# Patient Record
Sex: FEMALE | Race: BLACK OR AFRICAN AMERICAN | Employment: OTHER | URBAN - METROPOLITAN AREA
[De-identification: names, ages, dates, MRNs, and addresses within clinical notes are randomized per-mention and may not be internally consistent; named-entity substitution may affect disease eponyms.]

---

## 2017-02-08 PROBLEM — I63.9 CVA (CEREBROVASCULAR ACCIDENT) (HCC): Status: ACTIVE | Noted: 2017-02-08

## 2017-02-08 PROBLEM — I06.0 RHEUMATIC AORTIC STENOSIS: Status: ACTIVE | Noted: 2017-02-08

## 2017-02-08 PROBLEM — Q21.10 ATRIAL SEPTAL DEFECT: Status: ACTIVE | Noted: 2017-02-08

## 2017-02-13 ENCOUNTER — TELEPHONE (OUTPATIENT)
Dept: NUTRITION | Age: 72
End: 2017-02-13

## 2017-02-13 NOTE — TELEPHONE ENCOUNTER
Nutrition Counseling:  Pt left voicemail to ask about procedure for an appt here per Trina Aguirre RD, recommendation and stated she has [facility]. Left voicemail for pt and advised how to have referral sent and left fax number. Advised to call back and that I will be able to answer questions.     Barbara Franks MS, 65 [address], [address], [place]  W: [phone]  C: [phone]

## 2017-02-14 ENCOUNTER — TELEPHONE (OUTPATIENT)
Dept: NUTRITION | Age: 72
End: 2017-02-14

## 2017-02-28 ENCOUNTER — HOSPITAL ENCOUNTER (OUTPATIENT)
Dept: PHYSICAL THERAPY | Age: 72
Discharge: HOME | End: 2017-02-28
Payer: MEDICARE

## 2017-02-28 PROCEDURE — G8979 MOBILITY GOAL STATUS: HCPCS

## 2017-02-28 PROCEDURE — 97161 PT EVAL LOW COMPLEX 20 MIN: CPT

## 2017-02-28 PROCEDURE — G8978 MOBILITY CURRENT STATUS: HCPCS

## 2017-02-28 NOTE — PROGRESS NOTES
Missy Risk  : [facility]  [facility], [address] [place] [address],[address], [address] [facility]  [phone]   [phone]       OUTPATIENT PHYSICAL THERAPY:Initial Assessment 3/1/2017    ICD-10: Treatment Diagnosis: Pain in left knee (M25.562); Pain in right knee (M25.561); Difficulty in walking, not elsewhere classified (R26.2)  Precautions/Allergies:   Flexeril [cyclobenzaprine]   Fall Risk Score: 4 (? 5 = High Risk)  MD Orders: referral to physical therapy; please add aquatic therapy MEDICAL/REFERRING DIAGNOSIS:  Bilateral primary osteoarthritis of knee [M17.0]  Chronic pain of both ankles [M25.571, G89.29, M25.572]   DATE OF ONSET: 17  REFERRING PHYSICIAN: Melania Brambila MD  RETURN PHYSICIAN APPOINTMENT: 2017     INITIAL ASSESSMENT:  Ms. Ricki Davies presents with complaints of back, knee, and ankle pain. She presents with decreased LE strength, decreased endurance, and increased pain with activities. She is currently a member of the Sports [employer] in [facility] and would like learn exercises that she can do at their pool. She would benefit from skilled physical therapy to teach HEP and improve functional mobility. PROBLEM LIST (Impacting functional limitations):  1. Decreased Strength   2. Decreased endurance  3. Increased pain INTERVENTIONS PLANNED:  1. aquatic therapy   2. Therapeutic exercise for ROM, strengthening, flexibility  3. Modalities as needed for pain   TREATMENT PLAN:  Effective Dates: 17 TO 17. Frequency/Duration: 1 time a week for 4-6 weeks  GOALS: (Goals have been discussed and agreed upon with patient.)  Discharge Goals: Time Frame: 4-6 weeks  1. Pt will be independent with HEP. 2. Pt will improve 5 min walk endurance test to 500 ft for improved ADLs. 3. Pt will increase LE strength to 4+/5 for improved mobility.    Rehabilitation Potential For Stated Goals: Good  Regarding Kathee Hoof therapy, I certify that the treatment plan above will be carried out by a therapist or under their direction. Thank you for this referral,    Jenni Harper, PT         Referring Physician Signature: Melvi Cortez MD              Date                      HISTORY:   History of Present Injury/Illness (Reason for Referral):  Knee and back pain for years that has gotten worse. Pain in the knees, ankles, and back. She says that her blood may be producing too much protein and that could be contributing to her pain. She is seeing a hematologist for this. Ankles feel stiff in the morning. Gets stiff if sits for too long. Able to walk for 5 min before she needs to sit down. Unable to mop floor or push the vacuum for last 2 years. GOAL for therapy: help with pain and possibly loss some weight. 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 2 story house with main bedroom on the 2nd floor. She does have a bedroom on the 1st floor if she doesn't feel like going upstairs. No steps to get inside house. Prior Level of Function/Work/Activity:  Not working. Would like to be able to exercise. She is a member of the sports club but has not been since October. Current Medications:       Current Outpatient Prescriptions:     levothyroxine (SYNTHROID) 112 mcg tablet, Take  by mouth Daily (before breakfast). , Disp: , Rfl:     diflunisal (DOLOBID) 500 mg tab, Take  by mouth daily. , Disp: , Rfl:     omeprazole (PRILOSEC) 40 mg capsule, Take 40 mg by mouth daily. , Disp: , Rfl:     meclizine (ANTIVERT) 25 mg tablet, Take  by mouth three (3) times daily as needed. , Disp: , Rfl:     magnesium 250 mg tab, Take  by mouth., Disp: , Rfl:     cpap machine kit, by Does Not Apply route.  7 cm, Disp: , Rfl:     levETIRAcetam (KEPPRA) 250 mg tablet, Take 3 tablets in the morning and 3 tablets in the evening (Patient taking differently: 750 mg two (2) times a day.), Disp: 180 Tab, Rfl: 0    verapamil (CALAN) 240 mg capsule, Take 240 mg by mouth daily. , Disp: , Rfl:     aspirin (ASPIRIN) 325 mg tablet, Take 325 mg by mouth daily. , Disp: , Rfl:     rosuvastatin (CRESTOR) 20 mg tablet, Take 20 mg by mouth daily. , Disp: , Rfl:     calcium 500 mg tab, Take 600 mg by mouth two (2) times a day., Disp: , Rfl:     folic acid-vit F4-NUP X87 (FOLTX) 2.5-25-2 mg tablet, Take 1 Tab by mouth daily. , Disp: , Rfl:     torsemide (DEMADEX) 20 mg tablet, Take 20 mg by mouth daily as needed. , Disp: , Rfl:     potassium chloride SR (KLOR-CON 10) 10 mEq tablet, Take 10 mEq by mouth daily as needed. , Disp: , Rfl:     HYDROcodone-acetaminophen (NORCO)  mg tablet, Take 1 Tab by mouth every eight (8) hours as needed for Pain., Disp: , Rfl:     ranitidine (ZANTAC) 150 mg tablet, Take 150 mg by mouth two (2) times a day., Disp: , Rfl:     loratadine (CLARITIN) 10 mg tablet, Take 10 mg by mouth daily. , Disp: , Rfl:     ergocalciferol (VITAMIN D2) 50,000 unit capsule, Take 50,000 Units by mouth every seven (7) days. , Disp: , Rfl:     GLUCOSAMINE HCL/CHONDR BURNHAM A NA (OSTEO BI-FLEX PO), Take 2 Tabs by mouth two (2) times a day., Disp: , Rfl:    Date Last Reviewed:  2-28-17   Number of Personal Factors/Comorbidities that affect the Plan of Care: 1-2: MODERATE COMPLEXITY   EXAMINATION:   Observation/Orthostatic Postural Assessment:          Pt arrived to therapy with standard cane. She presents in no apparent distress.    Palpation:          No tenderness around the knees, tender to distal lower leg bilateral  Strength:  Manual muscle testing   Right LE Left LE   Hip flexion 4- 4-   Hip abduction in sitting 4 4   Knee flexion  4- 4-   Knee extension 4 4   Ankle DF 4+ 4+     Functional Mobility:         Gait/Ambulation:  Pt ambulates with wide base of support, decreased speed, increased right stance time, trunk lean side to side         Transfers:  Independent with transfers   Tool Used: Timed Up and Go (TUG)  Score:  Initial: 13 seconds Most Recent: X seconds (Date: -- )   Interpretation of Score: The test measures, in seconds, the time taken by an individual to stand up from a standard arm chair (seat height 46 cm [18 in], arm height 65 cm [25.6 in]), walk a distance of 3 meters (118 in, approx 10 ft), turn, walk back to the chair and sit down. If the individual takes longer than 14 seconds to complete TUG, this indicates risk for falls. ENDURANCE TEST: 5 MIN WALK TEST FOR ENDURANCE: 375 FT with no assistive device 02 after test: 98%; HR 88 bpm; 2 rest breaks taken during test     Body Structures Involved:  1. Joints  2. Muscles Body Functions Affected:  1. Neuromusculoskeletal Activities and Participation Affected:  1. Mobility  2. Community, Social and [place]   Number of elements (examined above) that affect the Plan of Care: 1-2: LOW COMPLEXITY   CLINICAL PRESENTATION:   Presentation: Stable and uncomplicated: LOW COMPLEXITY   CLINICAL DECISION MAKING:   Outcome Measure: Tool Used: Lower Extremity Functional Scale (LEFS)  Score:  Initial: 19/80 Most Recent: X/80 (Date: -- )   Interpretation of Score: 20 questions each scored on a 5 point scale with 0 representing \"extreme difficulty or unable to perform\" and 4 representing \"no difficulty\". The lower the score, the greater the functional disability. 80/80 represents no disability. Minimal detectable change is 9 points. Score 80 79-63 62-48 47-32 31-16 15-1 0   Modifier CH CI CJ CK CL CM CN     ?  Mobility - Walking and Moving Around:     - CURRENT STATUS: CL - 60%-79% impaired, limited or restricted    - GOAL STATUS: CK - 40%-59% impaired, limited or restricted    - D/C STATUS:  ---------------To be determined---------------    Medical Necessity:   · Patient is expected to demonstrate progress in strength, balance and endurance to to improve functional mobility. Reason for Services/Other Comments:  · Patient continues to require skilled intervention due to increased pain, decreased mobility. Use of outcome tool(s) and clinical judgement create a POC that gives a: Clear prediction of patient's progress: LOW COMPLEXITY            TREATMENT:   (In addition to Assessment/Re-Assessment sessions the following treatments were rendered)  Pre-treatment Symptoms/Complaints:  Pt reports no pain with sitting. Pain if she sits for too long or stands for too long. Pain: Initial: 0/10 with sitting; at worst 9/10     Post Session:  0/10. Assessment only today, no treatment provided. Discussed aquatic therapy and for patient to increase walking distance around the home. Treatment/Session Assessment:    · Response to Treatment:  No treatment today. · Compliance with Program/Exercises: Will assess as treatment progresses. · Recommendations/Intent for next treatment session: \"Next visit will focus on aquatic therapy for endurance, LE strengthening\".   Total Treatment Duration: 45 minutes  PT Patient Time In/Time Out  Time In: 1330  Time Out: [address]

## 2017-03-01 NOTE — PROGRESS NOTES
Ambulatory/Rehab Services H2 Model Falls Risk Assessment    Risk Factor Pts. ·   Confusion/Disorientation/Impulsivity  []    4 ·   Symptomatic Depression  []   2 ·   Altered Elimination  []   1 ·   Dizziness/Vertigo  [x]   1 ·   Gender (Male)  []   1 ·   Any administered antiepileptics (anticonvulsants):  [x]   2 ·   Any administered benzodiazepines:  []   1 ·   Visual Impairment (specify):  []   1 ·   Portable Oxygen Use  []   1 ·   Orthostatic ? BP  []   1 ·   History of Recent Falls (within 3 mos.)  []   5     Ability to Rise from Chair (choose one) Pts. ·   Ability to rise in a single movement  []   0 ·   Pushes up, successful in one attempt  [x]   1 ·   Multiple attempts, but successful  []   3 ·   Unable to rise without assistance  []   4   Total: (5 or greater = High Risk) 4     Falls Prevention Plan:   []                Physical Limitations to Exercise (specify):   []                Mobility Assistance Device (type):   []                Exercise/Equipment Adaptation (specify):    ©2010 [facility] [address] Patent #[id number].  Federal Law prohibits the replication, distribution or use without written permission from [facility] [employer]

## 2017-03-02 ENCOUNTER — DOCUMENTATION ONLY (OUTPATIENT)
Dept: NUTRITION | Age: 72
End: 2017-03-02

## 2017-03-02 ENCOUNTER — TELEPHONE (OUTPATIENT)
Dept: NUTRITION | Age: 72
End: 2017-03-02

## 2017-03-02 NOTE — TELEPHONE ENCOUNTER
Nutrition Counseling:  Spoke with pt regarding her self-requested referral from Dr. Arthur Dunlap had arrived. Noted that pt does not have coverage by Medicare (Has advised pt of no coverage without a PMH of diabetes or CKD before referral was sent.) Offered free group education classes information and contact information for pt to apply for a sponsorship for a one-on-one appt with the dietitian. Pt was agreeable, particularly to the free group education classes. Advised that I can mail a schedule and details for the  for the sponsorship application. Mailed then today. Will close referral and fax details to referring physician.     Erica Olivas MS, [address], [address], [place]  W: [phone]  C: [phone]

## 2017-03-07 ENCOUNTER — HOSPITAL ENCOUNTER (OUTPATIENT)
Dept: PHYSICAL THERAPY | Age: 72
Discharge: HOME | End: 2017-03-07

## 2017-03-07 NOTE — PROGRESS NOTES
Pt called to cancel appointment due to have heart issues and has a doctors appointment.      Venancio Hoffman, PTA

## 2017-03-14 ENCOUNTER — APPOINTMENT (OUTPATIENT)
Dept: PHYSICAL THERAPY | Age: 72
End: 2017-03-14

## 2017-03-21 ENCOUNTER — APPOINTMENT (OUTPATIENT)
Dept: PHYSICAL THERAPY | Age: 72
End: 2017-03-21

## 2017-03-28 ENCOUNTER — DOCUMENTATION ONLY (OUTPATIENT)
Dept: NUTRITION | Age: 72
End: 2017-03-28

## 2017-03-28 NOTE — PROGRESS NOTES
Nutrition Counseling:  Isaiah Barcenas, referral coordinator for Dr. Maggie Mccauley, called to check on referral status. Advised that I had faxed the outcome on 3/2/17. Advised I will re-fax the outcome and confirmed the fax number.     Yahir Nation MS, [address] [place] [address], [address], [place]  W: [phone]  C: [phone]

## 2017-04-20 ENCOUNTER — HOSPITAL ENCOUNTER (OUTPATIENT)
Dept: LAB | Age: 72
Discharge: HOME | End: 2017-04-20
Attending: INTERNAL MEDICINE
Payer: MEDICARE

## 2017-04-20 DIAGNOSIS — I06.0 RHEUMATIC AORTIC STENOSIS: ICD-10-CM

## 2017-04-20 LAB
ANION GAP BLD CALC-SCNC: 7 MMOL/L
BASOPHILS # BLD AUTO: 0 K/UL (ref 0–0.2)
BASOPHILS # BLD: 0 % (ref 0–2)
BUN SERPL-MCNC: 18 MG/DL (ref 8–23)
CALCIUM SERPL-MCNC: 8.7 MG/DL (ref 8.3–10.4)
CHLORIDE SERPL-SCNC: 109 MMOL/L (ref 98–107)
CO2 SERPL-SCNC: 27 MMOL/L (ref 21–32)
CREAT SERPL-MCNC: 0.9 MG/DL (ref 0.6–1)
DIFFERENTIAL METHOD BLD: ABNORMAL
EOSINOPHIL # BLD: 0.1 K/UL (ref 0–0.8)
EOSINOPHIL NFR BLD: 2 % (ref 0.5–7.8)
ERYTHROCYTE [DISTWIDTH] IN BLOOD BY AUTOMATED COUNT: 13.1 % (ref 11.9–14.6)
GLUCOSE SERPL-MCNC: 85 MG/DL (ref 65–100)
HCT VFR BLD AUTO: 40.7 % (ref 35.8–46.3)
HGB BLD-MCNC: 13.6 G/DL (ref 11.7–15.4)
INR PPP: 1 (ref 0.9–1.2)
LYMPHOCYTES # BLD AUTO: 31 % (ref 13–44)
LYMPHOCYTES # BLD: 1.1 K/UL (ref 0.5–4.6)
MCH RBC QN AUTO: 30.2 PG (ref 26.1–32.9)
MCHC RBC AUTO-ENTMCNC: 33.4 G/DL (ref 31.4–35)
MCV RBC AUTO: 90.4 FL (ref 79.6–97.8)
MONOCYTES # BLD: 0.2 K/UL (ref 0.1–1.3)
MONOCYTES NFR BLD AUTO: 6 % (ref 4–12)
NEUTS SEG # BLD: 2.2 K/UL (ref 1.7–8.2)
NEUTS SEG NFR BLD AUTO: 61 % (ref 43–78)
PLATELET # BLD AUTO: 117 K/UL (ref 150–450)
PMV BLD AUTO: 12.3 FL (ref 10.8–14.1)
POTASSIUM SERPL-SCNC: 4 MMOL/L (ref 3.5–5.1)
PROTHROMBIN TIME: 9.9 SEC (ref 9.6–12)
RBC # BLD AUTO: 4.5 M/UL (ref 4.05–5.25)
SODIUM SERPL-SCNC: 143 MMOL/L (ref 136–145)
WBC # BLD AUTO: 3.6 K/UL (ref 4.3–11.1)

## 2017-04-20 PROCEDURE — 85610 PROTHROMBIN TIME: CPT | Performed by: INTERNAL MEDICINE

## 2017-04-20 PROCEDURE — 80048 BASIC METABOLIC PNL TOTAL CA: CPT | Performed by: INTERNAL MEDICINE

## 2017-04-20 PROCEDURE — 36415 COLL VENOUS BLD VENIPUNCTURE: CPT | Performed by: INTERNAL MEDICINE

## 2017-04-20 PROCEDURE — 85025 COMPLETE CBC W/AUTO DIFF WBC: CPT | Performed by: INTERNAL MEDICINE

## 2017-05-02 NOTE — PROGRESS NOTES
Patient pre-assessment complete for [facility] poss with Dr Elis Cunningham scheduled for 5/3/17 at 11am, arrival time 9am. Patient verified using . Patient instructed to bring all home medications in labeled bottles on the day of procedure. NPO status reinforced. Patient informed to take a full dose aspirin 325mg  or 81 mg x 4 on the day of procedure. Instructed they can take all other medications excluding vitamins & supplements. Patient verbalizes understanding of all instructions & denies any questions at this time.

## 2017-05-03 ENCOUNTER — HOSPITAL ENCOUNTER (OUTPATIENT)
Dept: CARDIAC CATH/INVASIVE PROCEDURES | Age: 72
Discharge: HOME | End: 2017-05-03
Attending: INTERNAL MEDICINE | Admitting: INTERNAL MEDICINE
Payer: MEDICARE

## 2017-05-03 VITALS
HEIGHT: 61 IN | SYSTOLIC BLOOD PRESSURE: 181 MMHG | TEMPERATURE: 98.4 F | DIASTOLIC BLOOD PRESSURE: 78 MMHG | WEIGHT: 293 LBS | OXYGEN SATURATION: 98 % | HEART RATE: 71 BPM | RESPIRATION RATE: 16 BRPM | BODY MASS INDEX: 55.32 KG/M2

## 2017-05-03 PROBLEM — E85.9 AMYLOIDOSIS (HCC): Chronic | Status: ACTIVE | Noted: 2017-05-03

## 2017-05-03 LAB
ATRIAL RATE: 89 BPM
CALCULATED P AXIS, ECG09: 60 DEGREES
CALCULATED R AXIS, ECG10: -28 DEGREES
CALCULATED T AXIS, ECG11: 64 DEGREES
DIAGNOSIS, 93000: NORMAL
P-R INTERVAL, ECG05: 146 MS
Q-T INTERVAL, ECG07: 380 MS
QRS DURATION, ECG06: 100 MS
QTC CALCULATION (BEZET), ECG08: 462 MS
VENTRICULAR RATE, ECG03: 89 BPM

## 2017-05-03 PROCEDURE — C1769 GUIDE WIRE: HCPCS

## 2017-05-03 PROCEDURE — 77030029997 HC DEV COM RDL R BND TELE -B

## 2017-05-03 PROCEDURE — 99152 MOD SED SAME PHYS/QHP 5/>YRS: CPT

## 2017-05-03 PROCEDURE — 74011250636 HC RX REV CODE- 250/636: Performed by: INTERNAL MEDICINE

## 2017-05-03 PROCEDURE — 93571 IV DOP VEL&/PRESS C FLO 1ST: CPT

## 2017-05-03 PROCEDURE — 74011000258 HC RX REV CODE- 258: Performed by: INTERNAL MEDICINE

## 2017-05-03 PROCEDURE — C1887 CATHETER, GUIDING: HCPCS

## 2017-05-03 PROCEDURE — 74011000250 HC RX REV CODE- 250: Performed by: INTERNAL MEDICINE

## 2017-05-03 PROCEDURE — C1894 INTRO/SHEATH, NON-LASER: HCPCS

## 2017-05-03 PROCEDURE — 77030004534 HC CATH ANGI DX INFN CARD -A

## 2017-05-03 PROCEDURE — 77030015766

## 2017-05-03 PROCEDURE — 85347 COAGULATION TIME ACTIVATED: CPT | Performed by: INTERNAL MEDICINE

## 2017-05-03 PROCEDURE — 99153 MOD SED SAME PHYS/QHP EA: CPT

## 2017-05-03 PROCEDURE — 74011250636 HC RX REV CODE- 250/636

## 2017-05-03 PROCEDURE — 74011636320 HC RX REV CODE- 636/320: Performed by: INTERNAL MEDICINE

## 2017-05-03 PROCEDURE — 93458 L HRT ARTERY/VENTRICLE ANGIO: CPT

## 2017-05-03 PROCEDURE — 93005 ELECTROCARDIOGRAM TRACING: CPT | Performed by: INTERNAL MEDICINE

## 2017-05-03 RX ORDER — GUAIFENESIN 100 MG/5ML
324 LIQUID (ML) ORAL ONCE
Status: DISCONTINUED | OUTPATIENT
Start: 2017-05-03 | End: 2017-05-03 | Stop reason: HOSPADM

## 2017-05-03 RX ORDER — HEPARIN SODIUM 10000 [USP'U]/ML
40-80 INJECTION, SOLUTION INTRAVENOUS; SUBCUTANEOUS
Status: DISCONTINUED | OUTPATIENT
Start: 2017-05-03 | End: 2017-05-03 | Stop reason: HOSPADM

## 2017-05-03 RX ORDER — FENTANYL CITRATE 50 UG/ML
25-50 INJECTION, SOLUTION INTRAMUSCULAR; INTRAVENOUS
Status: DISCONTINUED | OUTPATIENT
Start: 2017-05-03 | End: 2017-05-03 | Stop reason: HOSPADM

## 2017-05-03 RX ORDER — HEPARIN SODIUM 200 [USP'U]/100ML
3 INJECTION, SOLUTION INTRAVENOUS CONTINUOUS
Status: DISCONTINUED | OUTPATIENT
Start: 2017-05-03 | End: 2017-05-03 | Stop reason: HOSPADM

## 2017-05-03 RX ORDER — MIDAZOLAM HYDROCHLORIDE 1 MG/ML
.5-2 INJECTION, SOLUTION INTRAMUSCULAR; INTRAVENOUS
Status: DISCONTINUED | OUTPATIENT
Start: 2017-05-03 | End: 2017-05-03 | Stop reason: HOSPADM

## 2017-05-03 RX ORDER — LIDOCAINE HYDROCHLORIDE 20 MG/ML
1-20 INJECTION, SOLUTION INFILTRATION; PERINEURAL
Status: DISCONTINUED | OUTPATIENT
Start: 2017-05-03 | End: 2017-05-03 | Stop reason: HOSPADM

## 2017-05-03 RX ORDER — SODIUM CHLORIDE 9 MG/ML
75 INJECTION, SOLUTION INTRAVENOUS CONTINUOUS
Status: DISCONTINUED | OUTPATIENT
Start: 2017-05-03 | End: 2017-05-03 | Stop reason: HOSPADM

## 2017-05-03 RX ORDER — DIAZEPAM 5 MG/1
5 TABLET ORAL ONCE
Status: DISCONTINUED | OUTPATIENT
Start: 2017-05-03 | End: 2017-05-03 | Stop reason: HOSPADM

## 2017-05-03 RX ADMIN — NITROGLYCERIN 0.15 MG: 200 INJECTION, SOLUTION INTRAVENOUS at 14:41

## 2017-05-03 RX ADMIN — HEPARIN SODIUM 2 ML: 10000 INJECTION, SOLUTION INTRAVENOUS; SUBCUTANEOUS at 14:23

## 2017-05-03 RX ADMIN — HEPARIN SODIUM 6000 UNITS: 10000 INJECTION, SOLUTION INTRAVENOUS; SUBCUTANEOUS at 14:35

## 2017-05-03 RX ADMIN — HEPARIN SODIUM 3 ML/HR: 200 INJECTION, SOLUTION INTRAVENOUS at 14:10

## 2017-05-03 RX ADMIN — LIDOCAINE HYDROCHLORIDE 60 MG: 20 INJECTION, SOLUTION INFILTRATION; PERINEURAL at 14:21

## 2017-05-03 RX ADMIN — IOPAMIDOL 140 ML: 755 INJECTION, SOLUTION INTRAVENOUS at 14:57

## 2017-05-03 RX ADMIN — FENTANYL CITRATE 50 MCG: 50 INJECTION, SOLUTION INTRAMUSCULAR; INTRAVENOUS at 14:15

## 2017-05-03 RX ADMIN — MIDAZOLAM HYDROCHLORIDE 2 MG: 1 INJECTION, SOLUTION INTRAMUSCULAR; INTRAVENOUS at 14:15

## 2017-05-03 RX ADMIN — SODIUM CHLORIDE 75 ML/HR: 900 INJECTION, SOLUTION INTRAVENOUS at 10:00

## 2017-05-03 NOTE — PROGRESS NOTES
Patient received to [address] [place] [address] room # 2  Ambulatory from [facility]. Patient scheduled for [facility] today with Dr Almaz Hilton. Procedure reviewed & questions answered, voiced good understanding consent obtained & placed on chart. All medications and medical history reviewed. Will prep patient per orders. Patient & family updated on plan of care.     Patient states she took  mg at 0800 am

## 2017-05-03 NOTE — PROGRESS NOTES
TRANSFER - OUT REPORT:    Verbal report given to Joselin Cunningham on Mari Case  being transferred to [facility] for routine progression of care       Report consisted of patients Situation, Background, Assessment and Recommendations(SBAR). Information from the following report(s) SBAR, Kardex, Procedure Summary and MAR was reviewed with the receiving nurse. Opportunity for questions and clarification was provided.       [facility] with Dr Lashanda Myers  iFR LAD  No intervention  6000 heparin  1441   2 versed  50 fentanyl  Right radial R band 12ml at 1455

## 2017-05-03 NOTE — PROGRESS NOTES
Assisted OOB & ambulated to BR & on unit. Tolerated activity without difficulty.  Right wrist without bleeding or hematoma

## 2017-05-03 NOTE — PROGRESS NOTES
TRANSFER - IN REPORT:    Verbal report received from Lauren RN(name) on Stormy Reach  being received from cath lab(unit) for routine progression of care      Report consisted of patients Situation, Background, Assessment and   Recommendations(SBAR). Information from the following report(s) Procedure Summary was reviewed with the receiving nurse. Opportunity for questions and clarification was provided. Assessment completed upon patients arrival to unit and care assumed.

## 2017-05-03 NOTE — DISCHARGE INSTRUCTIONS
HEART CATHETERIZATION/ANGIOGRAPHY DISCHARGE INSTRUCTIONS    1. Check puncture site frequently for swelling or bleeding. If there is any bleeding, lie down and apply pressure over the area with a clean towel or washcloth. Call 911. Notify your doctor for any redness, swelling, drainage, or oozing from the puncture site. Notify your doctor for any fever or chills. 2. If the extremity becomes cold, numb, or painful call [facility] at [phone].  3. Activity should be limited for the next 48 hours. Climb stairs as little as possible and avoid any stooping, bending, or strenuous activity for 48 hours. No heavy lifting (anything over 10 pounds) for 3 days. 4. You may resume your usual diet. Drink more fluids than usual.  5. Have a responsible person drive you home and stay with you for at least 24 hours after your heart catheterization/angiography. Do not drive for the next 24 hours. 6. You may remove bandage from your Right wrist in 24 hours. You may shower in 24 hours. No tub baths, hot tubs, or swimming for 1 week. Do not place any lotions, creams, powders, or ointments over puncture site for 1 week. You may place a clean band-aid over the puncture site each day for 5 days. Change daily. 7. Please continue your medications as prescribed by your physician. I have read the above instructions and have had the opportunity to ask questions.       Patient: ________________________   Date: 5/3/2017    Witness: _______________________   Date: 5/3/2017

## 2017-05-03 NOTE — IP AVS SNAPSHOT
Ravindra Mesa 
 
 
 [address] [facility] [address] [facility] 
[phone] Patient: Corey Beckford 
MRN: [id number] LCW:[id number] Discharge Summary 5/3/2017 Corey Beckford  
 MRN[de-identified]  [id number] Admission Information Provider Pager Service Admission Date Expected D/C Date Herminia Aaron MD  CARDIAC CATH LAB 5/3/2017 Actual LOS Patient Class 0 days OUTPATIENT Follow-up Information Follow up With Details Comments Contact Info Herminia Aaron MD  A follow-up appointment has been scheduled for you for June 6 at 9:45am in the [place] office with Dr. Remi Alonso. [address] [facility] [address] 
[phone] Current Discharge Medication List  
  
ASK your doctor about these medications Dose & Instructions Dispensing Information Comments Morning Noon Evening Bedtime  
 calcium 500 mg Tab Your last dose was: Your next dose is:    
   
   
 Dose:  600 mg Take 600 mg by mouth two (2) times a day. Refills:  0 CLARITIN 10 mg tablet Generic drug:  loratadine Your last dose was: Your next dose is:    
   
   
 Dose:  10 mg Take 10 mg by mouth daily. Refills:  0  
     
   
   
   
  
 CRESTOR 20 mg tablet Generic drug:  rosuvastatin Your last dose was: Your next dose is:    
   
   
 Dose:  20 mg Take 20 mg by mouth nightly. Refills:  0  
     
   
   
   
  
 diflunisal 500 mg Tab Commonly known as:  DOLOBID Your last dose was: Your next dose is: Take  by mouth daily. Refills:  0  
     
   
   
   
  
 levETIRAcetam 250 mg tablet Commonly known as:  KEPPRA Your last dose was: Your next dose is: Take 3 tablets in the morning and 3 tablets in the evening Quantity:  180 Tab Refills:  0  
     
   
   
   
  
 omeprazole 40 mg capsule Commonly known as:  PRILOSEC Your last dose was: Your next dose is:    
   
   
 Dose:  40 mg Take 40 mg by mouth daily. Refills:  0  
     
   
   
   
  
 OSTEO BI-FLEX PO Your last dose was: Your next dose is:    
   
   
 Dose:  2 Tab Take 2 Tabs by mouth two (2) times a day. Refills:  0  
     
   
   
   
  
 SYNTHROID 112 mcg tablet Generic drug:  levothyroxine Your last dose was: Your next dose is: Take  by mouth Daily (before breakfast). Refills:  0  
     
   
   
   
  
 verapamil  mg ER capsule Commonly known as:  Beronica Lewis Your last dose was: Your next dose is:    
   
   
 Dose:  240 mg Take 240 mg by mouth daily. Refills:  0  
     
   
   
   
  
 VITAMIN D2 50,000 unit capsule Generic drug:  ergocalciferol Your last dose was: Your next dose is:    
   
   
 Dose:  57977 Units Take 50,000 Units by mouth. Twice weekly Refills:  0 General Information Please provide this summary of care documentation to your next provider. Allergies Unspecified:  Flexeril [Cyclobenzaprine] Current Immunizations  Reviewed on 3/26/2015 No immunizations on file. Discharge Instructions Discharge Instructions HEART CATHETERIZATION/ANGIOGRAPHY DISCHARGE INSTRUCTIONS 1. Check puncture site frequently for swelling or bleeding. If there is any bleeding, lie down and apply pressure over the area with a clean towel or washcloth. Call 911. Notify your doctor for any redness, swelling, drainage, or oozing from the puncture site. Notify your doctor for any fever or chills. 2. If the extremity becomes cold, numb, or painful call [facility] at [phone]. 
3. Activity should be limited for the next 48 hours.  Climb stairs as little as possible and avoid any stooping, bending, or strenuous activity for 48 hours. No heavy lifting (anything over 10 pounds) for 3 days. 4. You may resume your usual diet. Drink more fluids than usual. 
5. Have a responsible person drive you home and stay with you for at least 24 hours after your heart catheterization/angiography. Do not drive for the next 24 hours. 6. You may remove bandage from your Right wrist in 24 hours. You may shower in 24 hours. No tub baths, hot tubs, or swimming for 1 week. Do not place any lotions, creams, powders, or ointments over puncture site for 1 week. You may place a clean band-aid over the puncture site each day for 5 days. Change daily. 7. Please continue your medications as prescribed by your physician. I have read the above instructions and have had the opportunity to ask questions. Patient: ________________________   Date: 5/3/2017 Witness: _______________________   Date: 5/3/2017 Discharge Orders None  
  
` Patient Signature:  ____________________________________________________________ Date:  ____________________________________________________________  
  
 [facility] [facility] Provider Signature:  ____________________________________________________________ Date:  ____________________________________________________________

## 2017-05-03 NOTE — PROGRESS NOTES
R band deflation completed. Right radial dressed with gauze and tegaderm using sterile technique. No bleeding or hematoma. Tolerated without difficulty. Discharge instructions given per orders, voiced good understanding of meds & follow up care. Denies any questions.

## 2017-05-03 NOTE — CONSULTS
[address] [facility]  Burgess Cranker. MD Lakhwinder Gaona. MD Elvia Acosta. Rochelle Jacobs, [address] [facility]       [id number]  5/3/2017        1945    REFERRING PHYSICIAN:  Madelyn    CHIEF COMPLAINT:  Shortness of breath    HISTORY OF PRESENT ILLNESS:  The patient is a 66 y.o. female who is seen for evaluation of aortic stenosis and CAD. Patient has multiple medical issues including morbid obesity, unspecified autoimmune disease, amyloidosis with pulmonary infiltrates, chronic thrombocytopenia, COPD, seizure disorder, severe arthritis, prior stroke, prior DVT. She has felt short of breath with minimal activity for a long time. It is hard for her to characterize further because she has multiple physical limitations and poor mobility. She does not have angina and has not had any syncope. She had an echo at [facility]. [facility] that showed preserved LVEF and severe AS with mean gradient of 42mmHg and REY 0.9cm2.       Past Medical History:   Diagnosis Date    Amyloidosis ([facility]     Arthritis     Autoimmune disease ([facility]     Chronic obstructive pulmonary disease ([facility] [facility]     amlyidosis    GERD (gastroesophageal reflux disease)     GI bleed 02/2015    Graves disease     H/O therapeutic radiation     Hiatal hernia     Hyperlipidemia     Hypertension     Morbid obesity ([address]     Osteoarthritis     Pneumonia     Pulmonary infiltrate     Seizures (HCC)     Stroke (HCC)     TIA    Thrombocytopenia (HCC)     Thromboembolus (HCC)     LEFT LEG DVT    Thyroid disease     Unspecified sleep apnea     no cpap       Past Surgical History:   Procedure Laterality Date    HX OTHER SURGICAL      lung biopsy & bone marrow biopsy       Family History   Problem Relation Age of Onset    Stroke Mother     Heart Disease Mother     Stroke Father     Heart Disease Father     Diabetes Father     Heart Disease Sister     Cancer Sister      another sister -- colon    Thyroid Disease Sister      third sister with this       Social History     Social History    Marital status:      Spouse name: Justin Genao Number of children: 5    Years of education: N/A     Occupational History          Book Keeper/ -- in the past     Social History Main Topics    Smoking status: Never Smoker    Smokeless tobacco: Never Used    Alcohol use No    Drug use: No    Sexual activity: Yes     Partners: Male     Other Topics Concern    Not on file     Social History Narrative    caffeine -- one cup per week. Allergies   Allergen Reactions    Flexeril [Cyclobenzaprine] Other (comments)     Wkness, \"stoke like symptoms\"       No current facility-administered medications on file prior to encounter. Current Outpatient Prescriptions on File Prior to Encounter   Medication Sig Dispense Refill    rosuvastatin (CRESTOR) 20 mg tablet Take 20 mg by mouth nightly.  levothyroxine (SYNTHROID) 112 mcg tablet Take  by mouth Daily (before breakfast).  diflunisal (DOLOBID) 500 mg tab Take  by mouth daily.  omeprazole (PRILOSEC) 40 mg capsule Take 40 mg by mouth daily.  levETIRAcetam (KEPPRA) 250 mg tablet Take 3 tablets in the morning and 3 tablets in the evening (Patient taking differently: 750 mg two (2) times a day.) 180 Tab 0    verapamil (CALAN) 240 mg capsule Take 240 mg by mouth daily.  calcium 500 mg tab Take 600 mg by mouth two (2) times a day.  loratadine (CLARITIN) 10 mg tablet Take 10 mg by mouth daily.  ergocalciferol (VITAMIN D2) 50,000 unit capsule Take 50,000 Units by mouth. Twice weekly      GLUCOSAMINE HCL/CHONDR BURNHAM A NA (OSTEO BI-FLEX PO) Take 2 Tabs by mouth two (2) times a day. REVIEW OF SYSTEMS:  GENERAL:  No weight loss. No fever. EYES:  No diplopia. No vision loss. ENTM:  No hearing loss. No trouble swallowing. No hoarseness. CARDIAC:  See present illness. PULMONARY:  +asthma/COPD. GI:  No change in bowel habits.  No recent bleeding. GI bleed 2 yrs ago while on eliquis, no source  :  No dysuria. No history of renal stones or renal insufficiency. MS:  +osteoarthritis. + chronic pain  NEURO:  +stroke +seizure disorder. No seizure since 2015  HEME/LYMPHATIC:  +DVT  PSYCHIATRIC:  No history of depression. PHYSICAL EXAMINATION:  GENERAL APPEARANCE:  Well developed. Well nourished. No distress. BMI > 50  HEENT:  Normocephalic. Extraocular muscles are intact. No scleral icterus. NECK/LYMPHATIC:  Supple. No thyromegaly or adenopathy. CHEST WALL: No deformity. LUNGS: Lungs are clear to auscultation. CARDIAC:  RRR difficult to auscultate heart sounds No carotid bruits. No jugular venous distention. + peripheral edema. Normal peripheral pulses  VASCULAR:  Pulses are full and equal at the radial arteries and the popliteal arteries bilaterally. ABDOMEN:  Soft and non-tender. No pulsatile masses. SKIN:  No rashes, cyanosis, jaundice, ecchymoses or evidence of skin breakdown. EXTREMITIES:  Full range of motion. No deformity. NEURO:  Grossly intact. No focal deficit. PSYCHIATRIC: Alert, cooperative and oriented times three. Normal speech and affect. IMPRESSION:  Severe AS  Two vessel CAD and LAD is iFR negative, RCA small distally ->medical managment    PLAN:  I have personally viewed the cardiac catheterization, echocardiogram, labs. I discussed in detail with Mrs. Broderick Abarca and her , the alternatives treatments for aortic stenosis including surgical and transcatheter aortic valve replacement. Her STS risk score is 3.2% (assuming mild lung disease) but I believe she is not a candidate for surgical aortic valve replacement due to poor rehabilitation potential. I discussed the risks and benefits of TAVR and explained the deportment of the operation.   Risks include but are not limited to: bleeding, vascular complications, need for conversion to open operation, need for mechanical circulatory support reoperation, renal failure, respiratory failure, myocardial infarction, stroke and death. All of her questions were answered and she would like to proceed. Will initiate TAVR workup.           [facility] CATH 1 ALL EVENTS

## 2017-05-03 NOTE — PROCEDURES
Brief Cardiac Procedure Note    Patient: Tayo Hensley MRN: [id number]  SSN: [id number]    YOB: 1945  Age: 66 y.o. Sex: female      Date of Procedure: 5/3/2017     Pre-procedure Diagnosis: Aortic Stenosis    Post-procedure Diagnosis: Aortic Stenosis and Coronary Artery Disease    Procedure: Left Heart Catheterization. IFR of LAD    Brief Description of Procedure: As above    Performed By: Jaylen Quigley MD     Assistants: None    Anesthesia: Moderate Sedation    Estimated Blood Loss: Less than 10 mL      Specimens: None    Implants: None    Findings: Severe RCA disease. Small vessel. Moderate LAD disease. IFR 0.91. Aortic stenosis. MG. 42.  SEvere aoritc stenosis. Complications: None    Recommendations: Continue medical therapy.     Signed By: Jaylen Quigley MD     May 3, 2017

## 2017-05-03 NOTE — PROCEDURES
Sindi Mcclellan 43       Name:  Gopi Villarreal   MR#:  [id number]   :  1945   Account #:  [de-identified]   Date of Adm:  2017       DATE OF PROCEDURE: 2017    PROCEDURES   1. Left heart catheterization. 2. Selective coronary arteriography. 3. No left ventriculogram.   4. iFR of LAD. INDICATION: Severe symptomatic aortic stenosis. Preaortic valve   catheterization requested. Eccentric stenosis of the LAD. TECHNICAL FACTORS: After informed consent was obtained, the   patient was brought to the cardiac catheterization lab. The   right radial artery was prepped and draped in the usual sterile   fashion. Utilizing modified Seldinger technique and   micropuncture needle, the right radial artery was entered. A 6-  [place] Terumo Slender sheath was placed without difficulty. A   radial cocktail consisting of 2000 units of heparin, 2 mg   verapamil, and 200 mcg of nitroglycerin was administered. A 5-  Western Cassie Stark Tail JR4.0 catheter was used to selectively engage   the ostium of the right coronary artery with some degree of   difficulty. I had to use a wire for catheter manipulation given   his subclavian tortuosity. A JL4 catheter was used to   selectively engage the ostium of the left main coronary artery. Selective injections in various projections were performed. With   advancing the wire with the Tiger catheter, it did cross the   aortic valve. The Tiger catheter was used for hemodynamic   measurements. No left ventriculogram was performed. Left   ventricular aortic pressure gradient was obtained by pullback   technique. After the diagnostic procedure, the patient was given an   additional 6000 units of heparin. A JL4 guide was used to   selectively engage the ostium of the left main coronary artery   and a Verrata wire was placed in the ostium of the left main   coronary artery and appropriately normalized.  This was then   placed in the distal LAD. The iFR of the LAD was 0.91,   indicating hemodynamically insignificant stenosis of the LAD. The wire was removed and final projections were obtained. SEDATION: The patient received 2 mg of Versed and 50 mcg of   fentanyl. Duration of sedation was 30 minutes. The patient did   receive moderate supervised conscious sedation. CONTRAST: Isovue 140. HEMODYNAMIC RESULTS   1. Aortic pressure was 138/82, with a mean of 99.   2. Left ventricular end-diastolic pressure was 20.   3. There was a mean gradient of 42 mmHg across the aortic valve   consistent with aortic stenosis. ANGIOGRAPHIC RESULTS   1. Left main coronary artery is a large caliber vessel, 20%   distal stenosis. 2. Left anterior descending is a large caliber vessel. There is   a 40% eccentric mid stenosis and a 50% eccentric distal   stenosis. 3. First diagonal artery: Small to medium caliber vessel, 60%   eccentric proximal stenosis. 4. Second diagonal artery: Small caliber vessel, 40% ostial   stenosis. 5. Third diagonal artery: Small caliber vessel, patent. 6. Left circumflex is a medium caliber, codominant vessel, 20%   proximal and 20% mid stenosis. 7. First obtuse marginal artery: Medium caliber vessel. Contains   20% to 30% proximal stenosis. 8. Second obtuse marginal: Small to medium caliber vessel,   patent. 9. Right coronary artery is a medium caliber, codominant vessel. Heavily diseased. 30% to 40% proximal, 60% mid, and 80% distal   stenosis. The far distal vessel involved in the stenosis is   a very small caliber 2 mm vessel, felt best treated medically. 10. The iFR of the LAD is 0.91, indicating hemodynamically   insignificant stenosis of the LAD. LEFT VENTRICULOGRAM: Not performed. CONCLUSIONS   1. Multivessel coronary artery disease with hemodynamically   insignificant stenosis of the left anterior descending and small   vessel disease involving the first diagonal artery and right   coronary artery. 2. Severe aortic stenosis. PLAN: Proceed with CT surgery evaluation. If the patient is felt   a significant risk for surgical aortic valve replacement we will   proceed with transaortic valve replacement workup.         MD AYDIN Shukla / THONY   D:  05/03/2017   15:05   T:  05/03/2017   15:33   Job #:  [id number]

## 2017-05-04 DIAGNOSIS — I35.0 AORTIC VALVE STENOSIS, UNSPECIFIED ETIOLOGY: Primary | ICD-10-CM

## 2017-05-04 DIAGNOSIS — R06.02 SOB (SHORTNESS OF BREATH) ON EXERTION: ICD-10-CM

## 2017-05-12 ENCOUNTER — HOSPITAL ENCOUNTER (OUTPATIENT)
Dept: CT IMAGING | Age: 72
Discharge: HOME | End: 2017-05-12
Attending: INTERNAL MEDICINE
Payer: MEDICARE

## 2017-05-12 ENCOUNTER — HOSPITAL ENCOUNTER (OUTPATIENT)
Dept: RESPIRATORY THERAPY | Age: 72
Discharge: HOME | End: 2017-05-12
Attending: INTERNAL MEDICINE
Payer: MEDICARE

## 2017-05-12 ENCOUNTER — HOSPITAL ENCOUNTER (OUTPATIENT)
Dept: ULTRASOUND IMAGING | Age: 72
Discharge: HOME | End: 2017-05-12
Attending: INTERNAL MEDICINE
Payer: MEDICARE

## 2017-05-12 DIAGNOSIS — I35.0 AORTIC VALVE STENOSIS, UNSPECIFIED ETIOLOGY: ICD-10-CM

## 2017-05-12 DIAGNOSIS — R06.02 SOB (SHORTNESS OF BREATH) ON EXERTION: ICD-10-CM

## 2017-05-12 PROCEDURE — 93880 EXTRACRANIAL BILAT STUDY: CPT

## 2017-05-12 PROCEDURE — 74174 CTA ABD&PLVS W/CONTRAST: CPT

## 2017-05-12 PROCEDURE — 74011636320 HC RX REV CODE- 636/320: Performed by: INTERNAL MEDICINE

## 2017-05-12 PROCEDURE — 74011000258 HC RX REV CODE- 258: Performed by: INTERNAL MEDICINE

## 2017-05-12 PROCEDURE — 75574 CT ANGIO HRT W/3D IMAGE: CPT

## 2017-05-12 PROCEDURE — 94010 BREATHING CAPACITY TEST: CPT

## 2017-05-12 PROCEDURE — 94729 DIFFUSING CAPACITY: CPT

## 2017-05-12 RX ORDER — SODIUM CHLORIDE 0.9 % (FLUSH) 0.9 %
10 SYRINGE (ML) INJECTION
Status: COMPLETED | OUTPATIENT
Start: 2017-05-12 | End: 2017-05-12

## 2017-05-12 RX ADMIN — IOPAMIDOL 150 ML: 755 INJECTION, SOLUTION INTRAVENOUS at 10:58

## 2017-05-12 RX ADMIN — SODIUM CHLORIDE 175 ML: 900 INJECTION, SOLUTION INTRAVENOUS at 10:58

## 2017-05-12 RX ADMIN — Medication 10 ML: at 10:58

## 2017-05-16 LAB — ACT BLD: 307 SECS (ref 79–138)

## 2017-05-27 ENCOUNTER — APPOINTMENT (OUTPATIENT)
Dept: GENERAL RADIOLOGY | Age: 72
End: 2017-05-27
Attending: EMERGENCY MEDICINE
Payer: MEDICARE

## 2017-05-27 ENCOUNTER — HOSPITAL ENCOUNTER (EMERGENCY)
Age: 72
Discharge: HOME | End: 2017-05-28
Attending: EMERGENCY MEDICINE
Payer: MEDICARE

## 2017-05-27 DIAGNOSIS — I35.0 AORTIC STENOSIS, SEVERE: Primary | ICD-10-CM

## 2017-05-27 DIAGNOSIS — R42 VERTIGO: ICD-10-CM

## 2017-05-27 LAB
ALBUMIN SERPL BCP-MCNC: 3 G/DL (ref 3.2–4.6)
ALBUMIN/GLOB SERPL: 0.6 {RATIO} (ref 1.2–3.5)
ALP SERPL-CCNC: 79 U/L (ref 50–136)
ALT SERPL-CCNC: 13 U/L (ref 12–65)
ANION GAP BLD CALC-SCNC: 7 MMOL/L (ref 7–16)
AST SERPL W P-5'-P-CCNC: 15 U/L (ref 15–37)
BASOPHILS # BLD AUTO: 0 K/UL (ref 0–0.2)
BASOPHILS # BLD: 0 % (ref 0–2)
BILIRUB SERPL-MCNC: 0.3 MG/DL (ref 0.2–1.1)
BUN SERPL-MCNC: 21 MG/DL (ref 8–23)
CALCIUM SERPL-MCNC: 8.7 MG/DL (ref 8.3–10.4)
CHLORIDE SERPL-SCNC: 110 MMOL/L (ref 98–107)
CO2 SERPL-SCNC: 27 MMOL/L (ref 21–32)
CREAT SERPL-MCNC: 1.1 MG/DL (ref 0.6–1)
DIFFERENTIAL METHOD BLD: ABNORMAL
EOSINOPHIL # BLD: 0 K/UL (ref 0–0.8)
EOSINOPHIL NFR BLD: 1 % (ref 0.5–7.8)
ERYTHROCYTE [DISTWIDTH] IN BLOOD BY AUTOMATED COUNT: 14.3 % (ref 11.9–14.6)
GLOBULIN SER CALC-MCNC: 5.2 G/DL (ref 2.3–3.5)
GLUCOSE SERPL-MCNC: 98 MG/DL (ref 65–100)
HCT VFR BLD AUTO: 37.8 % (ref 35.8–46.3)
HGB BLD-MCNC: 12.8 G/DL (ref 11.7–15.4)
IMM GRANULOCYTES # BLD: 0 K/UL (ref 0–0.5)
IMM GRANULOCYTES NFR BLD AUTO: 0.2 % (ref 0–5)
LYMPHOCYTES # BLD AUTO: 19 % (ref 13–44)
LYMPHOCYTES # BLD: 0.9 K/UL (ref 0.5–4.6)
MCH RBC QN AUTO: 30.2 PG (ref 26.1–32.9)
MCHC RBC AUTO-ENTMCNC: 33.9 G/DL (ref 31.4–35)
MCV RBC AUTO: 89.2 FL (ref 79.6–97.8)
MONOCYTES # BLD: 0.3 K/UL (ref 0.1–1.3)
MONOCYTES NFR BLD AUTO: 6 % (ref 4–12)
NEUTS SEG # BLD: 3.5 K/UL (ref 1.7–8.2)
NEUTS SEG NFR BLD AUTO: 74 % (ref 43–78)
PLATELET # BLD AUTO: 123 K/UL (ref 150–450)
PMV BLD AUTO: 12.2 FL (ref 10.8–14.1)
POTASSIUM SERPL-SCNC: 3.8 MMOL/L (ref 3.5–5.1)
PROT SERPL-MCNC: 8.2 G/DL (ref 6.3–8.2)
RBC # BLD AUTO: 4.24 M/UL (ref 4.05–5.25)
SODIUM SERPL-SCNC: 144 MMOL/L (ref 136–145)
TROPONIN I SERPL-MCNC: 0.04 NG/ML (ref 0.02–0.05)
WBC # BLD AUTO: 4.7 K/UL (ref 4.3–11.1)

## 2017-05-27 PROCEDURE — 84484 ASSAY OF TROPONIN QUANT: CPT | Performed by: EMERGENCY MEDICINE

## 2017-05-27 PROCEDURE — 93005 ELECTROCARDIOGRAM TRACING: CPT | Performed by: EMERGENCY MEDICINE

## 2017-05-27 PROCEDURE — 71020 XR CHEST PA LAT: CPT

## 2017-05-27 PROCEDURE — 99285 EMERGENCY DEPT VISIT HI MDM: CPT | Performed by: EMERGENCY MEDICINE

## 2017-05-27 PROCEDURE — 85025 COMPLETE CBC W/AUTO DIFF WBC: CPT | Performed by: EMERGENCY MEDICINE

## 2017-05-27 PROCEDURE — 80053 COMPREHEN METABOLIC PANEL: CPT | Performed by: EMERGENCY MEDICINE

## 2017-05-28 VITALS
HEART RATE: 82 BPM | RESPIRATION RATE: 18 BRPM | DIASTOLIC BLOOD PRESSURE: 89 MMHG | OXYGEN SATURATION: 98 % | TEMPERATURE: 98 F | SYSTOLIC BLOOD PRESSURE: 185 MMHG

## 2017-05-28 LAB
ATRIAL RATE: 69 BPM
CALCULATED P AXIS, ECG09: 67 DEGREES
CALCULATED R AXIS, ECG10: -29 DEGREES
CALCULATED T AXIS, ECG11: -42 DEGREES
DIAGNOSIS, 93000: NORMAL
P-R INTERVAL, ECG05: 158 MS
Q-T INTERVAL, ECG07: 400 MS
QRS DURATION, ECG06: 94 MS
QTC CALCULATION (BEZET), ECG08: 428 MS
TROPONIN I BLD-MCNC: 0.29 NG/ML (ref 0–0.08)
VENTRICULAR RATE, ECG03: 69 BPM

## 2017-05-28 PROCEDURE — 84484 ASSAY OF TROPONIN QUANT: CPT

## 2017-05-28 NOTE — ED TRIAGE NOTES
EMS states sudden onset of dizziness, weakness, and nausea (2030); EMS states no deficits; patient ambulatory to stretcher x 1 assist; BGL 74

## 2017-05-28 NOTE — ED NOTES
I have reviewed discharge instructions with the patient. The patient verbalized understanding. Pt discharged via wheelchair with spouse.

## 2017-05-28 NOTE — DISCHARGE INSTRUCTIONS
Aortic Valve Stenosis: Care Instructions  Your Care Instructions    Having aortic valve stenosis means that the valve between your heart and the large blood vessel that carries blood to the body (aorta) has narrowed. That forces the heart to pump harder to get enough blood through the valve. As stenosis gets worse, the valve gets narrower. This can cause symptoms. Symptoms include chest pain, dizziness, fainting, or shortness of breath. Surgery can fix the valve. The most common surgery is to replace the aortic valve. Your doctor may want to delay valve replacement until you have severe narrowing. You may take medicine to prevent or treat problems caused by aortic valve stenosis. Follow-up care is a key part of your treatment and safety. Be sure to make and go to all appointments, and call your doctor if you are having problems. It's also a good idea to know your test results and keep a list of the medicines you take. How can you care for yourself at home? · Be safe with medicines. Take your medicines exactly as prescribed. Call your doctor if you think you are having a problem with your medicine. You will get more details on the specific medicines your doctor prescribes. · Plan your meals so that you are eating heart-healthy foods. ¨ Eat a variety of foods daily. Fresh fruits and vegetables and whole grains are good choices. ¨ Limit your fat intake, especially saturated and trans fat. ¨ Limit salt (sodium). ¨ Increase fiber in your diet. ¨ Limit alcohol. · Be active. Ask your doctor what type and level of exercise is safe for you. Walking is a good choice. Your doctor may suggest that you join a cardiac rehabilitation program so that you can have help increasing your physical activity safely. · Do not smoke. Smoking can make aortic valve stenosis worse. If you need help quitting, talk to your doctor about stop-smoking programs and medicines.  These can increase your chances of quitting for good.  · Stay at a healthy weight. Lose weight if you need to. · Avoid colds and flu. Get a pneumococcal vaccine shot. If you have had one before, ask your doctor if you need another dose. Get a flu vaccine every year. · Take care of your teeth and gums. Get regular dental checkups. Good dental health is important because bacteria can spread from infected teeth and gums to the heart valves. When should you call for help? Call 911 anytime you think you may need emergency care. For example, call if:  · You passed out (lost consciousness). · You have symptoms of a heart attack. These may include:  ¨ Chest pain or pressure, or a strange feeling in the chest.  ¨ Sweating. ¨ Shortness of breath. ¨ Nausea or vomiting. ¨ Pain, pressure, or a strange feeling in the back, neck, jaw, or upper belly or in one or both shoulders or arms. ¨ Lightheadedness or sudden weakness. ¨ A fast or irregular heartbeat. After you call 911, the  may tell you to chew 1 adult-strength or 2 to 4 low-dose aspirin. Wait for an ambulance. Do not try to drive yourself. Call your doctor now or seek immediate medical care if:  · You develop new symptoms of aortic valve stenosis, such as chest pain, dizziness, or shortness of breath. · You have new or increased shortness of breath. · You are dizzy or lightheaded, or you feel like you may faint. · You have sudden weight gain, such as 3 pounds or more in 2 to 3 days. · You have increased swelling in your legs, ankles, or feet. Watch closely for changes in your health, and be sure to contact your doctor if:  · You have trouble making healthy lifestyle changes. · You want more information about healthy lifestyle changes. Where can you learn more? Go to http://jose-nuria.info/. Enter V146 in the search box to learn more about \"Aortic Valve Stenosis: Care Instructions. \"  Current as of: January 27, 2016  Content Version: 11.2  © 2545-9337 [employer]. Care instructions adapted under license by [employer] (which disclaims liability or warranty for this information). If you have questions about a medical condition or this instruction, always ask your healthcare professional. [address] any warranty or liability for your use of this information.

## 2017-05-28 NOTE — ED PROVIDER NOTES
Patient is a 66 y.o. female presenting with dizziness. The history is provided by the patient and a relative. Dizziness   This is a new problem. The current episode started 3 to 5 hours ago. The problem has been resolved. There was no focality noted. Pertinent negatives include no focal weakness and no mental status change. There has been no fever. Associated symptoms include vomiting. Pertinent negatives include no chest pain, no altered mental status, no confusion and no headaches. Associated medical issues do not include trauma.         Past Medical History:   Diagnosis Date    [facility]     Arthritis     Autoimmune disease ([facility]     Chronic obstructive pulmonary disease ([facility]     amlyidosis    GERD (gastroesophageal reflux disease)     GI bleed 02/2015    Graves disease     H/O therapeutic radiation     Hiatal hernia     Hyperlipidemia     Hypertension     Morbid obesity ([facility]     Osteoarthritis     Pneumonia     Pulmonary infiltrate     Seizures (HCC)     Stroke (HCC)     TIA    Thrombocytopenia (HCC)     Thromboembolus (HCC)     LEFT LEG DVT    Thyroid disease     Unspecified sleep apnea     no cpap       Past Surgical History:   Procedure Laterality Date    HX OTHER SURGICAL      lung biopsy & bone marrow biopsy         Family History:   Problem Relation Age of Onset    Stroke Mother     Heart Disease Mother     Stroke Father     Heart Disease Father     Diabetes Father     Heart Disease Sister     Cancer Sister      another sister -- colon    Thyroid Disease Sister      third sister with this       Social History     Social History    Marital status:      Spouse name: Sagrario Zayas Number of children: 5    Years of education: N/A     Occupational History          Book Keeper/ -- in the past     Social History Main Topics    Smoking status: Never Smoker    Smokeless tobacco: Never Used    Alcohol use No    Drug use: No    Sexual activity: Yes Partners: Male     Other Topics Concern    Not on file     Social History Narrative    caffeine -- one cup per week. ALLERGIES: Flexeril [cyclobenzaprine]    Review of Systems   Constitutional: Negative. Negative for chills and fever. HENT: Negative. Negative for congestion, ear pain, postnasal drip and rhinorrhea. Eyes: Negative for pain and visual disturbance. Respiratory: Negative for cough and wheezing. Cardiovascular: Negative for chest pain and leg swelling. Gastrointestinal: Positive for vomiting. Negative for abdominal distention and abdominal pain. Endocrine: Negative. Negative for polydipsia, polyphagia and polyuria. Genitourinary: Negative. Negative for difficulty urinating, flank pain and frequency. Musculoskeletal: Negative. Negative for arthralgias and myalgias. Skin: Negative. Neurological: Positive for dizziness. Negative for focal weakness and headaches. Hematological: Negative. Psychiatric/Behavioral: Negative for confusion. Vitals:    05/27/17 2248 05/27/17 2321 05/27/17 2340 05/27/17 2359   BP: 120/54  (!) 204/92    Pulse: 71 75 80 74   Resp: 18      Temp:       SpO2: 97% 99% 99% 98%            Physical Exam   Constitutional: She is oriented to person, place, and time. She appears well-developed and well-nourished. Non-toxic appearance. She does not have a sickly appearance. She does not appear ill. No distress. HENT:   Head: Normocephalic and atraumatic. Cardiovascular: Intact distal pulses. Pulmonary/Chest: Effort normal.   Abdominal: Soft. Neurological: She is alert and oriented to person, place, and time. Skin: Skin is warm and dry. Psychiatric: She has a normal mood and affect. Her behavior is normal.   Nursing note and vitals reviewed.        MDM  Number of Diagnoses or Management Options  Aortic stenosis, severe: new and requires workup  Vertigo: new and requires workup  Diagnosis management comments: ECG: NSR without ST elevation. Non-specific repolarization changes. Patient appears to be recently getting several tests in regards to severe aortic valve stenosis and was planned to have her procedure the near future did not have follow-up for one week. Today was doing some light activity and states that she got dizzy as described with room spinning with some associated nausea and vomiting ×1. She states she has been feeling better slowly since the incident. No other additional symptoms described. Denies chest pain. Denies shortness of breath. Patient states she also recently suffered a close family member loss and was supposed to go to a  tomorrow which may be contributing to her symptoms. Reviewed with the on-call cardiologist who felt her aortic valve stenosis was severe enough that stress and exertion can exacerbate her symptoms easily. Recommends rest and could have her reevaluated by cardiology early this week rather than waiting another 2 weeks to be seen as planned. Patient will be ruled out as per protocol with 2 sets of enzymes and assuming she remains improved will be discharged home as discussed. The slight elevation in troponin was reviewed with the cardiologist.  Patient has been sleeping since earlier presentation and states she is feeling completely back to normal.  She is walking around the department and went to the restroom on her own and feels no dizziness or lightheadedness. She states that she is having no chest pain or shortness of breath. After reviewing her recent catheterization and history of severe stenosis he felt this could be a reasonable Thai slight bump in her troponin and does not feel this is likely to be ACS and if she is still comfortable being discharged home to be followed in the office this week.   All this was discussed and reviewed at length with the patient and her family and they were agreeable with plan and will return to the ER as needed for any change in condition in the interval.       Amount and/or Complexity of Data Reviewed  Clinical lab tests: ordered and reviewed (Results for orders placed or performed during the hospital encounter of 05/27/17  -CBC WITH AUTOMATED DIFF       Result                                            Value                         Ref Range                       WBC                                               4.7                           4.3 - 11.1 K/uL                 RBC                                               4.24                          4.05 - 5.25 M/uL                HGB                                               12.8                          11.7 - 15.4 g/dL                HCT                                               37.8                          35.8 - 46.3 %                   MCV                                               89.2                          79.6 - 97.8 FL                  MCH                                               30.2                          26.1 - 32.9 PG                  MCHC                                              33.9                          31.4 - 35.0 g/dL                RDW                                               14.3                          11.9 - 14.6 %                   PLATELET                                          123 (L)                       150 - 450 K/uL                  MPV                                               12.2                          10.8 - 14.1 FL                  DF                                                AUTOMATED                                                     NEUTROPHILS                                       74                            43 - 78 %                       LYMPHOCYTES                                       19                            13 - 44 %                       MONOCYTES                                         6                             4.0 - 12.0 %                    EOSINOPHILS 1                             0.5 - 7.8 %                     BASOPHILS                                         0                             0.0 - 2.0 %                     IMMATURE GRANULOCYTES                             0.2                           0.0 - 5.0 %                     ABS. NEUTROPHILS                                  3.5                           1.7 - 8.2 K/UL                  ABS. LYMPHOCYTES                                  0.9                           0.5 - 4.6 K/UL                  ABS. MONOCYTES                                    0.3                           0.1 - 1.3 K/UL                  ABS. EOSINOPHILS                                  0.0                           0.0 - 0.8 K/UL                  ABS. BASOPHILS                                    0.0                           0.0 - 0.2 K/UL                  ABS. IMM.  GRANS.                                  0.0                           0.0 - 0.5 K/UL             -METABOLIC PANEL, COMPREHENSIVE       Result                                            Value                         Ref Range                       Sodium                                            144                           136 - 145 mmol/L                Potassium                                         3.8                           3.5 - 5.1 mmol/L                Chloride                                          110 (H)                       98 - 107 mmol/L                 CO2                                               27                            21 - 32 mmol/L                  Anion gap                                         7                             7 - 16 mmol/L                   Glucose                                           98                            65 - 100 mg/dL                  BUN                                               21                            8 - 23 MG/DL                    Creatinine 1.10 (H)                      0.6 - 1.0 MG/DL                 GFR est AA                                        >60                           >60 ml/min/1.73m2               GFR est non-AA                                    52 (L)                        >60 ml/min/1.73m2               Calcium                                           8.7                           8.3 - 10.4 MG/DL                Bilirubin, total                                  0.3                           0.2 - 1.1 MG/DL                 ALT (SGPT)                                        13                            12 - 65 U/L                     AST (SGOT)                                        15                            15 - 37 U/L                     Alk. phosphatase                                  79                            50 - 136 U/L                    Protein, total                                    8.2                           6.3 - 8.2 g/dL                  Albumin                                           3.0 (L)                       3.2 - 4.6 g/dL                  Globulin                                          5.2 (H)                       2.3 - 3.5 g/dL                  A-G Ratio                                         0.6 (L)                       1.2 - 3.5                  -TROPONIN I       Result                                            Value                         Ref Range                       Troponin-I, Qt.                                   0.04                          0.02 - 0.05 NG/ML          -POC TROPONIN-I       Result                                            Value                         Ref Range                       Troponin-I (POC)                                  0.29 (H)                      0.0 - 0.08 ng/ml           )  Tests in the radiology section of CPT®: ordered and reviewed (Xr Chest Pa Lat    Result Date: 5/27/2017  EXAM:  XR CHEST PA LAT INDICATION:   dizziness COMPARISON: 3/25/2015. FINDINGS: PA and lateral radiographs of the chest demonstrate clear lungs. The cardiac and mediastinal contours and pulmonary vascularity are normal.  The bones and soft tissues are within normal limits.      IMPRESSION: Normal chest.     )      ED Course       Procedures

## 2017-06-06 PROBLEM — I35.0 NONRHEUMATIC AORTIC VALVE STENOSIS: Status: ACTIVE | Noted: 2017-02-08

## 2017-06-06 PROBLEM — I25.10 CORONARY ARTERY DISEASE INVOLVING NATIVE CORONARY ARTERY OF NATIVE HEART WITHOUT ANGINA PECTORIS: Status: ACTIVE | Noted: 2017-06-06

## 2017-06-21 ENCOUNTER — ANESTHESIA EVENT (OUTPATIENT)
Dept: SURGERY | Age: 72
DRG: 266 | End: 2017-06-21
Payer: MEDICARE

## 2017-06-21 ENCOUNTER — HOSPITAL ENCOUNTER (OUTPATIENT)
Dept: GENERAL RADIOLOGY | Age: 72
Discharge: HOME | End: 2017-06-21
Payer: MEDICARE

## 2017-06-21 ENCOUNTER — HOSPITAL ENCOUNTER (OUTPATIENT)
Dept: SURGERY | Age: 72
Discharge: HOME | End: 2017-06-21
Payer: MEDICARE

## 2017-06-21 VITALS
HEART RATE: 80 BPM | SYSTOLIC BLOOD PRESSURE: 149 MMHG | OXYGEN SATURATION: 96 % | DIASTOLIC BLOOD PRESSURE: 77 MMHG | BODY MASS INDEX: 55.32 KG/M2 | RESPIRATION RATE: 20 BRPM | WEIGHT: 293 LBS | TEMPERATURE: 98.2 F | HEIGHT: 61 IN

## 2017-06-21 LAB
ALBUMIN SERPL BCP-MCNC: 2.9 G/DL (ref 3.2–4.6)
ALBUMIN/GLOB SERPL: 0.6 {RATIO} (ref 1.2–3.5)
ALP SERPL-CCNC: 83 U/L (ref 50–136)
ALT SERPL-CCNC: 17 U/L (ref 12–65)
ANION GAP BLD CALC-SCNC: 8 MMOL/L (ref 7–16)
APPEARANCE UR: CLEAR
AST SERPL W P-5'-P-CCNC: 18 U/L (ref 15–37)
ATRIAL RATE: 77 BPM
BACTERIA SPEC CULT: ABNORMAL
BACTERIA URNS QL MICRO: ABNORMAL /HPF
BILIRUB SERPL-MCNC: 0.4 MG/DL (ref 0.2–1.1)
BILIRUB UR QL: NEGATIVE
BNP SERPL-MCNC: 134 PG/ML
BUN SERPL-MCNC: 22 MG/DL (ref 8–23)
CALCIUM SERPL-MCNC: 8.8 MG/DL (ref 8.3–10.4)
CALCULATED P AXIS, ECG09: 67 DEGREES
CALCULATED R AXIS, ECG10: -21 DEGREES
CALCULATED T AXIS, ECG11: -36 DEGREES
CASTS URNS QL MICRO: ABNORMAL /LPF
CHLORIDE SERPL-SCNC: 108 MMOL/L (ref 98–107)
CO2 SERPL-SCNC: 26 MMOL/L (ref 21–32)
COLOR UR: YELLOW
CREAT SERPL-MCNC: 0.98 MG/DL (ref 0.6–1)
DIAGNOSIS, 93000: NORMAL
EPI CELLS #/AREA URNS HPF: ABNORMAL /HPF
ERYTHROCYTE [DISTWIDTH] IN BLOOD BY AUTOMATED COUNT: 14.2 % (ref 11.9–14.6)
EST. AVERAGE GLUCOSE BLD GHB EST-MCNC: 114 MG/DL
GLOBULIN SER CALC-MCNC: 5.1 G/DL (ref 2.3–3.5)
GLUCOSE SERPL-MCNC: 77 MG/DL (ref 65–100)
GLUCOSE UR STRIP.AUTO-MCNC: NEGATIVE MG/DL
HBA1C MFR BLD: 5.6 % (ref 4.8–6)
HCT VFR BLD AUTO: 36.1 % (ref 35.8–46.3)
HGB BLD-MCNC: 12 G/DL (ref 11.7–15.4)
HGB UR QL STRIP: NEGATIVE
INR PPP: 0.9 (ref 0.9–1.2)
KETONES UR QL STRIP.AUTO: NEGATIVE MG/DL
LEUKOCYTE ESTERASE UR QL STRIP.AUTO: ABNORMAL
MAGNESIUM SERPL-MCNC: 2.1 MG/DL (ref 1.8–2.4)
MCH RBC QN AUTO: 29.9 PG (ref 26.1–32.9)
MCHC RBC AUTO-ENTMCNC: 33.2 G/DL (ref 31.4–35)
MCV RBC AUTO: 90 FL (ref 79.6–97.8)
NITRITE UR QL STRIP.AUTO: NEGATIVE
P-R INTERVAL, ECG05: 148 MS
PH UR STRIP: 5.5 [PH] (ref 5–9)
PLATELET # BLD AUTO: 131 K/UL (ref 150–450)
PMV BLD AUTO: 12.8 FL (ref 10.8–14.1)
POTASSIUM SERPL-SCNC: 4 MMOL/L (ref 3.5–5.1)
PROT SERPL-MCNC: 8 G/DL (ref 6.3–8.2)
PROT UR STRIP-MCNC: NEGATIVE MG/DL
PROTHROMBIN TIME: 10.2 SEC (ref 9.6–12)
Q-T INTERVAL, ECG07: 398 MS
QRS DURATION, ECG06: 96 MS
QTC CALCULATION (BEZET), ECG08: 450 MS
RBC # BLD AUTO: 4.01 M/UL (ref 4.05–5.25)
RBC #/AREA URNS HPF: ABNORMAL /HPF
SERVICE CMNT-IMP: ABNORMAL
SODIUM SERPL-SCNC: 142 MMOL/L (ref 136–145)
SP GR UR REFRACTOMETRY: 1.02 (ref 1–1.02)
UROBILINOGEN UR QL STRIP.AUTO: 0.2 EU/DL (ref 0.2–1)
VENTRICULAR RATE, ECG03: 77 BPM
WBC # BLD AUTO: 3.6 K/UL (ref 4.3–11.1)
WBC URNS QL MICRO: ABNORMAL /HPF

## 2017-06-21 PROCEDURE — 87086 URINE CULTURE/COLONY COUNT: CPT | Performed by: PHYSICIAN ASSISTANT

## 2017-06-21 PROCEDURE — 93005 ELECTROCARDIOGRAM TRACING: CPT | Performed by: PHYSICIAN ASSISTANT

## 2017-06-21 PROCEDURE — 83880 ASSAY OF NATRIURETIC PEPTIDE: CPT | Performed by: PHYSICIAN ASSISTANT

## 2017-06-21 PROCEDURE — 83036 HEMOGLOBIN GLYCOSYLATED A1C: CPT | Performed by: PHYSICIAN ASSISTANT

## 2017-06-21 PROCEDURE — 85027 COMPLETE CBC AUTOMATED: CPT | Performed by: PHYSICIAN ASSISTANT

## 2017-06-21 PROCEDURE — 83735 ASSAY OF MAGNESIUM: CPT | Performed by: PHYSICIAN ASSISTANT

## 2017-06-21 PROCEDURE — 80053 COMPREHEN METABOLIC PANEL: CPT | Performed by: PHYSICIAN ASSISTANT

## 2017-06-21 PROCEDURE — 71020 XR CHEST PA LAT: CPT

## 2017-06-21 PROCEDURE — 81001 URINALYSIS AUTO W/SCOPE: CPT | Performed by: PHYSICIAN ASSISTANT

## 2017-06-21 PROCEDURE — 85610 PROTHROMBIN TIME: CPT | Performed by: PHYSICIAN ASSISTANT

## 2017-06-21 PROCEDURE — 77030027138 HC INCENT SPIROMETER -A

## 2017-06-21 PROCEDURE — 87641 MR-STAPH DNA AMP PROBE: CPT | Performed by: PHYSICIAN ASSISTANT

## 2017-06-21 RX ORDER — DIFLUNISAL 500 MG/1
500 TABLET, FILM COATED ORAL AS NEEDED
COMMUNITY
End: 2017-07-07

## 2017-06-21 RX ORDER — LEVETIRACETAM 750 MG/1
750 TABLET ORAL 2 TIMES DAILY
COMMUNITY

## 2017-06-21 NOTE — PERIOP NOTES
Recent Results (from the past 12 hour(s))   URINALYSIS W/ RFLX MICROSCOPIC    Collection Time: 06/21/17  8:27 AM   Result Value Ref Range    Color YELLOW      Appearance CLEAR      Specific gravity 1.024 (H) 1.001 - 1.023      pH (UA) 5.5 5.0 - 9.0      Protein NEGATIVE  NEG mg/dL    Glucose NEGATIVE  mg/dL    Ketone NEGATIVE  NEG mg/dL    Bilirubin NEGATIVE  NEG      Blood NEGATIVE  NEG      Urobilinogen 0.2 0.2 - 1.0 EU/dL    Nitrites NEGATIVE  NEG      Leukocyte Esterase TRACE (A) NEG      WBC 3-5 0 /hpf    RBC 0-3 0 /hpf    Epithelial cells 0-3 0 /hpf    Bacteria TRACE 0 /hpf    Casts 0-3 0 /lpf   CBC W/O DIFF    Collection Time: 06/21/17  8:45 AM   Result Value Ref Range    WBC 3.6 (L) 4.3 - 11.1 K/uL    RBC 4.01 (L) 4.05 - 5.25 M/uL    HGB 12.0 11.7 - 15.4 g/dL    HCT 36.1 35.8 - 46.3 %    MCV 90.0 79.6 - 97.8 FL    MCH 29.9 26.1 - 32.9 PG    MCHC 33.2 31.4 - 35.0 g/dL    RDW 14.2 11.9 - 14.6 %    PLATELET 674 (L) 777 - 450 K/uL    MPV 12.8 10.8 - 16.7 FL   METABOLIC PANEL, COMPREHENSIVE    Collection Time: 06/21/17  8:45 AM   Result Value Ref Range    Sodium 142 136 - 145 mmol/L    Potassium 4.0 3.5 - 5.1 mmol/L    Chloride 108 (H) 98 - 107 mmol/L    CO2 26 21 - 32 mmol/L    Anion gap 8 7 - 16 mmol/L    Glucose 77 65 - 100 mg/dL    BUN 22 8 - 23 MG/DL    Creatinine 0.98 0.6 - 1.0 MG/DL    GFR est AA >60 >60 ml/min/1.73m2    GFR est non-AA 59 (L) >60 ml/min/1.73m2    Calcium 8.8 8.3 - 10.4 MG/DL    Bilirubin, total 0.4 0.2 - 1.1 MG/DL    ALT (SGPT) 17 12 - 65 U/L    AST (SGOT) 18 15 - 37 U/L    Alk.  phosphatase 83 50 - 136 U/L    Protein, total 8.0 6.3 - 8.2 g/dL    Albumin 2.9 (L) 3.2 - 4.6 g/dL    Globulin 5.1 (H) 2.3 - 3.5 g/dL    A-G Ratio 0.6 (L) 1.2 - 3.5     PROTHROMBIN TIME + INR    Collection Time: 06/21/17  8:45 AM   Result Value Ref Range    Prothrombin time 10.2 9.6 - 12.0 sec    INR 0.9 0.9 - 1.2     BNP    Collection Time: 06/21/17  8:45 AM   Result Value Ref Range     pg/mL   HEMOGLOBIN A1C WITH EAG    Collection Time: 06/21/17  8:45 AM   Result Value Ref Range    Hemoglobin A1c 5.6 4.8 - 6.0 %    Est. average glucose 114 mg/dL   MAGNESIUM    Collection Time: 06/21/17  8:45 AM   Result Value Ref Range    Magnesium 2.1 1.8 - 2.4 mg/dL   MSSA/MRSA SC BY PCR, NASAL SWAB    Collection Time: 06/21/17  9:02 AM   Result Value Ref Range    Special Requests: NO SPECIAL REQUESTS      Culture result: (A)       MRSA target DNA not detected, SA target DNA detected. A MRSA negative, SA positive test result does not preclude MRSA nasal colonization.

## 2017-06-21 NOTE — PERIOP NOTES
Call placed to pt, spoke with pt, instructed pt to start using Bactroban BID for 5 days for a total of 10 doses. Pt also instructed to us Bactroban the night before and the am of surg. Pt states she just picked the ointment up and will start using it. Pt also instructed to return to [facility] on Monday 6.26.17 for T/C between the hours of 8-3:30. Pt voice understanding.

## 2017-06-21 NOTE — ANESTHESIA PREPROCEDURE EVALUATION
Anesthetic History               Review of Systems / Medical History  Patient summary reviewed and pertinent labs reviewed    Pulmonary        Sleep apnea: No treatment  Shortness of breath         Neuro/Psych              Cardiovascular    Hypertension: well controlled          CAD (No severe obstruction) and hyperlipidemia    Exercise tolerance: <4 METS  Comments: Endorses chest tightness  EF 60-65%  REY 0.76;  Mean gradient 42 mmHg  Able to preform ADL's  See cath report, no significant obstruction but multivessel dz  Possible near syncope   GI/Hepatic/Renal     GERD: well controlled           Endo/Other      Hypothyroidism: well controlled  Morbid obesity and arthritis     Other Findings   Comments: LLE DVT  Graves dz           Physical Exam    Airway  Mallampati: II  TM Distance: 4 - 6 cm  Neck ROM: normal range of motion   Mouth opening: Normal     Cardiovascular    Rhythm: regular  Rate: normal    Murmur: Grade 2, Aortic area     Dental  No notable dental hx       Pulmonary  Breath sounds clear to auscultation               Abdominal  GI exam deferred       Other Findings            Anesthetic Plan    ASA: 4  Anesthesia type: general    Monitoring Plan: Arterial line      Induction: Intravenous  Anesthetic plan and risks discussed with: Patient and Spouse      Discussed possibility of CVL

## 2017-06-21 NOTE — PERIOP NOTES
Patient verified name, , and surgery as listed in [facility]. TYPE  CASE:3  Orders per surgeon:  Received in [facility]  Labs per surgeon:CBC,CMP,PT/INR,Albumin,Bilirubin,BNP,UA-UA culture,Hgb A1c,Mg,MRSA/MSSA/ T/C 17: results pending  Labs per anesthesia protocol:   EKG  :  EKG 17, 17/ Cath 17/ECHO 17/Last office note 17,3.6.17,17      Patient provided with handouts including guide to surgery , transfusions, pain management and hand hygiene for the family and community. Pt verbalizes understanding of all pre-op instructions . Instructed that family must be present in building at all times. Nothing to eat or drink after midnight the night prior to surgery. Hibiclens and instructions given per hospital policy. Instructed patient to continue  previous medications as prescribed prior to surgery and  to take the following medications the day of surgery according to anesthesia guidelines : Verapamil,Prilosec,Synthroid,Keppra       Original medication prescription bottles where not visualized during patient appointment. Medication profile updated and reviewed with patient. Continue all previous medications unless otherwise directed. Instructed patient to hold  the following medications prior to surgery: Calcium, Osteo Bi-Flex,Vitamin D    Instructed on chest-xray procedure mupirocin with Rx called into [address] [place] [address] at [phone]. Mupirocin ointment to be used the night before surgery and the am of surgery. Incentive spirometry completed with return demonstration. Patient voiced understanding. FEV1 done as ordered . Patient verbalized understanding of all instructions and provided all medical/health information to the best of their ability.

## 2017-06-21 NOTE — PERIOP NOTES
All labs reviewed. MSSA positive., Albumin 2.9, WBC 3.6, UA with trace of Leukocyte, . CXR negative. Abnormal labs reported to [facility] navigator. All labs to be routed to Dr Roxana Rucker office.

## 2017-06-23 LAB
BACTERIA SPEC CULT: NORMAL
SERVICE CMNT-IMP: NORMAL

## 2017-06-26 ENCOUNTER — HOSPITAL ENCOUNTER (OUTPATIENT)
Dept: LAB | Age: 72
Discharge: HOME | End: 2017-06-26
Payer: MEDICARE

## 2017-06-26 PROCEDURE — 86900 BLOOD TYPING SEROLOGIC ABO: CPT | Performed by: PHYSICIAN ASSISTANT

## 2017-06-26 PROCEDURE — 36415 COLL VENOUS BLD VENIPUNCTURE: CPT | Performed by: PHYSICIAN ASSISTANT

## 2017-06-26 PROCEDURE — 86923 COMPATIBILITY TEST ELECTRIC: CPT | Performed by: PHYSICIAN ASSISTANT

## 2017-06-27 ENCOUNTER — HOSPITAL ENCOUNTER (INPATIENT)
Age: 72
LOS: 3 days | Discharge: HOME | DRG: 266 | End: 2017-06-30
Attending: INTERNAL MEDICINE | Admitting: INTERNAL MEDICINE
Payer: MEDICARE

## 2017-06-27 ENCOUNTER — ANESTHESIA (OUTPATIENT)
Dept: SURGERY | Age: 72
DRG: 266 | End: 2017-06-27
Payer: MEDICARE

## 2017-06-27 PROBLEM — I50.33 DIASTOLIC CHF, ACUTE ON CHRONIC (HCC): Chronic | Status: ACTIVE | Noted: 2017-06-27

## 2017-06-27 PROBLEM — Z95.2 S/P TAVR (TRANSCATHETER AORTIC VALVE REPLACEMENT): Chronic | Status: ACTIVE | Noted: 2017-06-27

## 2017-06-27 PROBLEM — I35.0 AORTIC STENOSIS: Status: ACTIVE | Noted: 2017-06-27

## 2017-06-27 LAB
ANION GAP BLD CALC-SCNC: 10 MMOL/L (ref 7–16)
BASE DEFICIT BLD-SCNC: 3 MMOL/L
BASE DEFICIT BLD-SCNC: 4 MMOL/L
BASE DEFICIT BLD-SCNC: 4 MMOL/L
BASOPHILS # BLD AUTO: 0 K/UL (ref 0–0.2)
BASOPHILS # BLD: 0 % (ref 0–2)
BUN SERPL-MCNC: 18 MG/DL (ref 8–23)
CA-I BLD-MCNC: 1.16 MMOL/L (ref 1.12–1.32)
CA-I BLD-MCNC: 1.17 MMOL/L (ref 1.12–1.32)
CA-I BLD-MCNC: 1.18 MMOL/L (ref 1.12–1.32)
CALCIUM SERPL-MCNC: 7.9 MG/DL (ref 8.3–10.4)
CHLORIDE SERPL-SCNC: 112 MMOL/L (ref 98–107)
CO2 SERPL-SCNC: 22 MMOL/L (ref 21–32)
CREAT SERPL-MCNC: 0.88 MG/DL (ref 0.6–1)
DIFFERENTIAL METHOD BLD: ABNORMAL
EOSINOPHIL # BLD: 0.1 K/UL (ref 0–0.8)
EOSINOPHIL NFR BLD: 3 % (ref 0.5–7.8)
ERYTHROCYTE [DISTWIDTH] IN BLOOD BY AUTOMATED COUNT: 14.4 % (ref 11.9–14.6)
GLUCOSE BLD STRIP.AUTO-MCNC: 87 MG/DL (ref 70–105)
GLUCOSE BLD STRIP.AUTO-MCNC: 88 MG/DL (ref 70–105)
GLUCOSE BLD STRIP.AUTO-MCNC: 95 MG/DL (ref 70–105)
GLUCOSE SERPL-MCNC: 103 MG/DL (ref 65–100)
HCO3 BLD-SCNC: 20.8 MMOL/L (ref 22–26)
HCO3 BLD-SCNC: 22 MMOL/L (ref 22–26)
HCO3 BLD-SCNC: 22.6 MMOL/L (ref 22–26)
HCT VFR BLD AUTO: 34 % (ref 35.8–46.3)
HGB BLD-MCNC: 11.5 G/DL (ref 11.7–15.4)
IMM GRANULOCYTES # BLD: 0 K/UL (ref 0–0.5)
IMM GRANULOCYTES NFR BLD AUTO: 0.3 % (ref 0–5)
LYMPHOCYTES # BLD AUTO: 40 % (ref 13–44)
LYMPHOCYTES # BLD: 1.6 K/UL (ref 0.5–4.6)
MCH RBC QN AUTO: 30 PG (ref 26.1–32.9)
MCHC RBC AUTO-ENTMCNC: 33.8 G/DL (ref 31.4–35)
MCV RBC AUTO: 88.8 FL (ref 79.6–97.8)
MONOCYTES # BLD: 0.3 K/UL (ref 0.1–1.3)
MONOCYTES NFR BLD AUTO: 6 % (ref 4–12)
NEUTS SEG # BLD: 2 K/UL (ref 1.7–8.2)
NEUTS SEG NFR BLD AUTO: 51 % (ref 43–78)
PCO2 BLD: 35.5 MMHG (ref 35–45)
PCO2 BLD: 38.7 MMHG (ref 35–45)
PCO2 BLD: 41.8 MMHG (ref 35–45)
PH BLD: 7.33 [PH] (ref 7.35–7.45)
PH BLD: 7.37 [PH] (ref 7.35–7.45)
PH BLD: 7.38 [PH] (ref 7.35–7.45)
PLATELET # BLD AUTO: 98 K/UL (ref 150–450)
PMV BLD AUTO: 11.5 FL (ref 10.8–14.1)
PO2 BLD: 103 MMHG (ref 80–105)
PO2 BLD: 121 MMHG (ref 80–105)
PO2 BLD: 94 MMHG (ref 80–105)
POTASSIUM BLD-SCNC: 3.6 MMOL/L (ref 3.5–5.1)
POTASSIUM BLD-SCNC: 3.6 MMOL/L (ref 3.5–5.1)
POTASSIUM BLD-SCNC: 3.9 MMOL/L (ref 3.5–5.1)
POTASSIUM SERPL-SCNC: 3.5 MMOL/L (ref 3.5–5.1)
RBC # BLD AUTO: 3.83 M/UL (ref 4.05–5.25)
SAO2 % BLD: 97 % (ref 95–98)
SAO2 % BLD: 98 % (ref 95–98)
SAO2 % BLD: 98 % (ref 95–98)
SODIUM BLD-SCNC: 142 MMOL/L (ref 138–146)
SODIUM BLD-SCNC: 143 MMOL/L (ref 138–146)
SODIUM BLD-SCNC: 143 MMOL/L (ref 138–146)
SODIUM SERPL-SCNC: 144 MMOL/L (ref 136–145)
WBC # BLD AUTO: 4 K/UL (ref 4.3–11.1)

## 2017-06-27 PROCEDURE — 80048 BASIC METABOLIC PNL TOTAL CA: CPT | Performed by: INTERNAL MEDICINE

## 2017-06-27 PROCEDURE — 74011250637 HC RX REV CODE- 250/637: Performed by: INTERNAL MEDICINE

## 2017-06-27 PROCEDURE — C1769 GUIDE WIRE: HCPCS

## 2017-06-27 PROCEDURE — 77030004559 HC CATH ANGI DX SUPT CARD -B

## 2017-06-27 PROCEDURE — 74011000250 HC RX REV CODE- 250: Performed by: INTERNAL MEDICINE

## 2017-06-27 PROCEDURE — 74011636320 HC RX REV CODE- 636/320: Performed by: INTERNAL MEDICINE

## 2017-06-27 PROCEDURE — 77010033678 HC OXYGEN DAILY

## 2017-06-27 PROCEDURE — B3101ZZ FLUOROSCOPY OF THORACIC AORTA USING LOW OSMOLAR CONTRAST: ICD-10-PCS | Performed by: INTERNAL MEDICINE

## 2017-06-27 PROCEDURE — 77030005318 HC CATH ELECTRD PACE TMP BARD -C

## 2017-06-27 PROCEDURE — 77030004534 HC CATH ANGI DX INFN CARD -A

## 2017-06-27 PROCEDURE — 02RF38Z REPLACEMENT OF AORTIC VALVE WITH ZOOPLASTIC TISSUE, PERCUTANEOUS APPROACH: ICD-10-PCS | Performed by: INTERNAL MEDICINE

## 2017-06-27 PROCEDURE — 77030019908 HC STETH ESOPH SIMS -A: Performed by: ANESTHESIOLOGY

## 2017-06-27 PROCEDURE — 74011250636 HC RX REV CODE- 250/636: Performed by: INTERNAL MEDICINE

## 2017-06-27 PROCEDURE — 77030018836 HC SOL IRR NACL ICUM -A: Performed by: INTERNAL MEDICINE

## 2017-06-27 PROCEDURE — 77030011640 HC PAD GRND REM COVD -A: Performed by: INTERNAL MEDICINE

## 2017-06-27 PROCEDURE — 82947 ASSAY GLUCOSE BLOOD QUANT: CPT

## 2017-06-27 PROCEDURE — 82803 BLOOD GASES ANY COMBINATION: CPT

## 2017-06-27 PROCEDURE — 77030037468 HC VLV AORT EDWRD -L: Performed by: INTERNAL MEDICINE

## 2017-06-27 PROCEDURE — C1760 CLOSURE DEV, VASC: HCPCS

## 2017-06-27 PROCEDURE — 77030005401 HC CATH RAD ARRO -A: Performed by: ANESTHESIOLOGY

## 2017-06-27 PROCEDURE — 77030008477 HC STYL SATN SLP COVD -A: Performed by: ANESTHESIOLOGY

## 2017-06-27 PROCEDURE — 77030008703 HC TU ET UNCUF COVD -A: Performed by: ANESTHESIOLOGY

## 2017-06-27 PROCEDURE — 77030016564 HC BLD STRNL SAW4 CNMD -B: Performed by: INTERNAL MEDICINE

## 2017-06-27 PROCEDURE — 77030019940 HC BLNKT HYPOTHRM STRY -B: Performed by: ANESTHESIOLOGY

## 2017-06-27 PROCEDURE — 77030004558 HC CATH ANGI DX SUPR TORQ CARD -A

## 2017-06-27 PROCEDURE — 65610000006 HC RM INTENSIVE CARE

## 2017-06-27 PROCEDURE — 85025 COMPLETE CBC W/AUTO DIFF WBC: CPT | Performed by: INTERNAL MEDICINE

## 2017-06-27 PROCEDURE — B24BZZ4 ULTRASONOGRAPHY OF HEART WITH AORTA, TRANSESOPHAGEAL: ICD-10-PCS | Performed by: INTERNAL MEDICINE

## 2017-06-27 PROCEDURE — 74011250636 HC RX REV CODE- 250/636

## 2017-06-27 PROCEDURE — C1769 GUIDE WIRE: HCPCS | Performed by: INTERNAL MEDICINE

## 2017-06-27 PROCEDURE — 74011000250 HC RX REV CODE- 250

## 2017-06-27 PROCEDURE — 74011250637 HC RX REV CODE- 250/637: Performed by: PHYSICIAN ASSISTANT

## 2017-06-27 PROCEDURE — 77030013794 HC KT TRNSDUC BLD EDWD -B: Performed by: ANESTHESIOLOGY

## 2017-06-27 PROCEDURE — 93005 ELECTROCARDIOGRAM TRACING: CPT | Performed by: INTERNAL MEDICINE

## 2017-06-27 PROCEDURE — 77030002986 HC SUT PROL J&J -A: Performed by: INTERNAL MEDICINE

## 2017-06-27 PROCEDURE — 77030020407 HC IV BLD WRMR ST 3M -A: Performed by: ANESTHESIOLOGY

## 2017-06-27 PROCEDURE — 74011000258 HC RX REV CODE- 258: Performed by: INTERNAL MEDICINE

## 2017-06-27 PROCEDURE — C1894 INTRO/SHEATH, NON-LASER: HCPCS

## 2017-06-27 PROCEDURE — 77030013292 HC BOWL MX PRSM J&J -A: Performed by: ANESTHESIOLOGY

## 2017-06-27 PROCEDURE — 77030012221 HC CATH FOL CRITCOR BARD -B: Performed by: INTERNAL MEDICINE

## 2017-06-27 PROCEDURE — 77030031139 HC SUT VCRL2 J&J -A: Performed by: INTERNAL MEDICINE

## 2017-06-27 PROCEDURE — 77030013687 HC GD NDL BARD -B

## 2017-06-27 PROCEDURE — 77030005537 HC CATH URETH BARD -A: Performed by: INTERNAL MEDICINE

## 2017-06-27 PROCEDURE — 76010000222 HC CV SURG 2 TO 2.5 HR INTENSV-TIER 1: Performed by: INTERNAL MEDICINE

## 2017-06-27 PROCEDURE — 76010000155 HC AUTO TRANSFUSION/CELL SAVER: Performed by: INTERNAL MEDICINE

## 2017-06-27 PROCEDURE — 77030020782 HC GWN BAIR PAWS FLX 3M -B: Performed by: ANESTHESIOLOGY

## 2017-06-27 PROCEDURE — 76060000036 HC ANESTHESIA 2.5 TO 3 HR: Performed by: INTERNAL MEDICINE

## 2017-06-27 PROCEDURE — 74011250636 HC RX REV CODE- 250/636: Performed by: PHYSICIAN ASSISTANT

## 2017-06-27 PROCEDURE — 74011250636 HC RX REV CODE- 250/636: Performed by: ANESTHESIOLOGY

## 2017-06-27 PROCEDURE — 77030018548 HC SUT ETHBND2 J&J -B: Performed by: INTERNAL MEDICINE

## 2017-06-27 DEVICE — VALVE AORTIC SAPEIN 3 23MM -- COMMANDER SYS 9600TFX: Type: IMPLANTABLE DEVICE | Site: AORTIC VALVE | Status: FUNCTIONAL

## 2017-06-27 RX ORDER — FUROSEMIDE 10 MG/ML
40 INJECTION INTRAMUSCULAR; INTRAVENOUS ONCE
Status: COMPLETED | OUTPATIENT
Start: 2017-06-27 | End: 2017-06-27

## 2017-06-27 RX ORDER — HEPARIN SODIUM 1000 [USP'U]/ML
INJECTION, SOLUTION INTRAVENOUS; SUBCUTANEOUS AS NEEDED
Status: DISCONTINUED | OUTPATIENT
Start: 2017-06-27 | End: 2017-06-27 | Stop reason: HOSPADM

## 2017-06-27 RX ORDER — SODIUM CHLORIDE 0.9 % (FLUSH) 0.9 %
5-10 SYRINGE (ML) INJECTION EVERY 8 HOURS
Status: DISCONTINUED | OUTPATIENT
Start: 2017-06-27 | End: 2017-06-30 | Stop reason: HOSPADM

## 2017-06-27 RX ORDER — SODIUM CHLORIDE 9 MG/ML
INJECTION, SOLUTION INTRAVENOUS
Status: DISCONTINUED | OUTPATIENT
Start: 2017-06-27 | End: 2017-06-27 | Stop reason: HOSPADM

## 2017-06-27 RX ORDER — ROSUVASTATIN CALCIUM 20 MG/1
20 TABLET, COATED ORAL
Status: DISCONTINUED | OUTPATIENT
Start: 2017-06-27 | End: 2017-06-30 | Stop reason: HOSPADM

## 2017-06-27 RX ORDER — CEFAZOLIN SODIUM IN 0.9 % NACL 2 G/50 ML
2 INTRAVENOUS SOLUTION, PIGGYBACK (ML) INTRAVENOUS ONCE
Status: DISCONTINUED | OUTPATIENT
Start: 2017-06-27 | End: 2017-06-27 | Stop reason: DRUGHIGH

## 2017-06-27 RX ORDER — MIDAZOLAM HYDROCHLORIDE 1 MG/ML
2 INJECTION, SOLUTION INTRAMUSCULAR; INTRAVENOUS
Status: DISCONTINUED | OUTPATIENT
Start: 2017-06-27 | End: 2017-06-27 | Stop reason: HOSPADM

## 2017-06-27 RX ORDER — ONDANSETRON 2 MG/ML
INJECTION INTRAMUSCULAR; INTRAVENOUS AS NEEDED
Status: DISCONTINUED | OUTPATIENT
Start: 2017-06-27 | End: 2017-06-27 | Stop reason: HOSPADM

## 2017-06-27 RX ORDER — LIDOCAINE HYDROCHLORIDE 20 MG/ML
INJECTION, SOLUTION EPIDURAL; INFILTRATION; INTRACAUDAL; PERINEURAL AS NEEDED
Status: DISCONTINUED | OUTPATIENT
Start: 2017-06-27 | End: 2017-06-27 | Stop reason: HOSPADM

## 2017-06-27 RX ORDER — VECURONIUM BROMIDE FOR INJECTION 1 MG/ML
INJECTION, POWDER, LYOPHILIZED, FOR SOLUTION INTRAVENOUS AS NEEDED
Status: DISCONTINUED | OUTPATIENT
Start: 2017-06-27 | End: 2017-06-27 | Stop reason: HOSPADM

## 2017-06-27 RX ORDER — CLOPIDOGREL BISULFATE 75 MG/1
300 TABLET ORAL
Status: COMPLETED | OUTPATIENT
Start: 2017-06-27 | End: 2017-06-27

## 2017-06-27 RX ORDER — SUCCINYLCHOLINE CHLORIDE 20 MG/ML
INJECTION INTRAMUSCULAR; INTRAVENOUS AS NEEDED
Status: DISCONTINUED | OUTPATIENT
Start: 2017-06-27 | End: 2017-06-27 | Stop reason: HOSPADM

## 2017-06-27 RX ORDER — LEVOTHYROXINE SODIUM 112 UG/1
112 TABLET ORAL
Status: DISCONTINUED | OUTPATIENT
Start: 2017-06-28 | End: 2017-06-30 | Stop reason: HOSPADM

## 2017-06-27 RX ORDER — LIDOCAINE HYDROCHLORIDE 10 MG/ML
0.1 INJECTION INFILTRATION; PERINEURAL AS NEEDED
Status: DISCONTINUED | OUTPATIENT
Start: 2017-06-27 | End: 2017-06-27 | Stop reason: HOSPADM

## 2017-06-27 RX ORDER — FUROSEMIDE 10 MG/ML
INJECTION INTRAMUSCULAR; INTRAVENOUS
Status: COMPLETED
Start: 2017-06-27 | End: 2017-06-27

## 2017-06-27 RX ORDER — NITROGLYCERIN 0.4 MG/1
0.4 TABLET SUBLINGUAL
Status: DISCONTINUED | OUTPATIENT
Start: 2017-06-27 | End: 2017-06-27 | Stop reason: SDUPTHER

## 2017-06-27 RX ORDER — NICARDIPINE HYDROCHLORIDE 0.1 MG/ML
1-10 INJECTION INTRAVENOUS
Status: DISCONTINUED | OUTPATIENT
Start: 2017-06-27 | End: 2017-06-27 | Stop reason: SDUPTHER

## 2017-06-27 RX ORDER — ONDANSETRON 2 MG/ML
4 INJECTION INTRAMUSCULAR; INTRAVENOUS
Status: DISCONTINUED | OUTPATIENT
Start: 2017-06-27 | End: 2017-06-30 | Stop reason: HOSPADM

## 2017-06-27 RX ORDER — HYDROCODONE BITARTRATE AND ACETAMINOPHEN 5; 325 MG/1; MG/1
1 TABLET ORAL
Status: DISCONTINUED | OUTPATIENT
Start: 2017-06-27 | End: 2017-06-27 | Stop reason: SDUPTHER

## 2017-06-27 RX ORDER — MIDAZOLAM HYDROCHLORIDE 1 MG/ML
2 INJECTION, SOLUTION INTRAMUSCULAR; INTRAVENOUS ONCE
Status: DISCONTINUED | OUTPATIENT
Start: 2017-06-27 | End: 2017-06-27 | Stop reason: HOSPADM

## 2017-06-27 RX ORDER — NEOSTIGMINE METHYLSULFATE 1 MG/ML
INJECTION INTRAVENOUS AS NEEDED
Status: DISCONTINUED | OUTPATIENT
Start: 2017-06-27 | End: 2017-06-27 | Stop reason: HOSPADM

## 2017-06-27 RX ORDER — MORPHINE SULFATE 2 MG/ML
2 INJECTION, SOLUTION INTRAMUSCULAR; INTRAVENOUS
Status: DISCONTINUED | OUTPATIENT
Start: 2017-06-27 | End: 2017-06-27 | Stop reason: SDUPTHER

## 2017-06-27 RX ORDER — SODIUM CHLORIDE, SODIUM LACTATE, POTASSIUM CHLORIDE, CALCIUM CHLORIDE 600; 310; 30; 20 MG/100ML; MG/100ML; MG/100ML; MG/100ML
75 INJECTION, SOLUTION INTRAVENOUS CONTINUOUS
Status: DISCONTINUED | OUTPATIENT
Start: 2017-06-27 | End: 2017-06-27 | Stop reason: HOSPADM

## 2017-06-27 RX ORDER — CLOPIDOGREL BISULFATE 75 MG/1
75 TABLET ORAL DAILY
Status: DISCONTINUED | OUTPATIENT
Start: 2017-06-28 | End: 2017-06-30 | Stop reason: HOSPADM

## 2017-06-27 RX ORDER — PROTAMINE SULFATE 10 MG/ML
INJECTION, SOLUTION INTRAVENOUS AS NEEDED
Status: DISCONTINUED | OUTPATIENT
Start: 2017-06-27 | End: 2017-06-27 | Stop reason: HOSPADM

## 2017-06-27 RX ORDER — NITROGLYCERIN 0.4 MG/1
0.4 TABLET SUBLINGUAL
Status: DISCONTINUED | OUTPATIENT
Start: 2017-06-27 | End: 2017-06-30 | Stop reason: HOSPADM

## 2017-06-27 RX ORDER — MORPHINE SULFATE 2 MG/ML
2 INJECTION, SOLUTION INTRAMUSCULAR; INTRAVENOUS
Status: DISCONTINUED | OUTPATIENT
Start: 2017-06-27 | End: 2017-06-29 | Stop reason: SDUPTHER

## 2017-06-27 RX ORDER — GUAIFENESIN 100 MG/5ML
81 LIQUID (ML) ORAL DAILY
Status: DISCONTINUED | OUTPATIENT
Start: 2017-06-28 | End: 2017-06-30 | Stop reason: HOSPADM

## 2017-06-27 RX ORDER — ACETAMINOPHEN 325 MG/1
650 TABLET ORAL
Status: DISCONTINUED | OUTPATIENT
Start: 2017-06-27 | End: 2017-06-27 | Stop reason: SDUPTHER

## 2017-06-27 RX ORDER — FENTANYL CITRATE 50 UG/ML
INJECTION, SOLUTION INTRAMUSCULAR; INTRAVENOUS AS NEEDED
Status: DISCONTINUED | OUTPATIENT
Start: 2017-06-27 | End: 2017-06-27 | Stop reason: HOSPADM

## 2017-06-27 RX ORDER — SODIUM CHLORIDE 0.9 % (FLUSH) 0.9 %
5-10 SYRINGE (ML) INJECTION AS NEEDED
Status: DISCONTINUED | OUTPATIENT
Start: 2017-06-27 | End: 2017-06-30 | Stop reason: HOSPADM

## 2017-06-27 RX ORDER — FUROSEMIDE 40 MG/1
40 TABLET ORAL EVERY 12 HOURS
Status: DISCONTINUED | OUTPATIENT
Start: 2017-06-28 | End: 2017-06-28

## 2017-06-27 RX ORDER — HYDROCODONE BITARTRATE AND ACETAMINOPHEN 5; 325 MG/1; MG/1
1 TABLET ORAL
Status: DISCONTINUED | OUTPATIENT
Start: 2017-06-27 | End: 2017-06-30 | Stop reason: HOSPADM

## 2017-06-27 RX ORDER — LISINOPRIL 20 MG/1
20 TABLET ORAL EVERY 12 HOURS
Status: DISCONTINUED | OUTPATIENT
Start: 2017-06-27 | End: 2017-06-29

## 2017-06-27 RX ORDER — ETOMIDATE 2 MG/ML
INJECTION INTRAVENOUS AS NEEDED
Status: DISCONTINUED | OUTPATIENT
Start: 2017-06-27 | End: 2017-06-27 | Stop reason: HOSPADM

## 2017-06-27 RX ORDER — LORAZEPAM 1 MG/1
1 TABLET ORAL
Status: DISCONTINUED | OUTPATIENT
Start: 2017-06-27 | End: 2017-06-30 | Stop reason: HOSPADM

## 2017-06-27 RX ORDER — ASPIRIN 81 MG/1
81 TABLET ORAL DAILY
Status: DISCONTINUED | OUTPATIENT
Start: 2017-06-27 | End: 2017-06-27 | Stop reason: HOSPADM

## 2017-06-27 RX ORDER — METOPROLOL SUCCINATE 50 MG/1
25 TABLET, EXTENDED RELEASE ORAL DAILY
Status: DISCONTINUED | OUTPATIENT
Start: 2017-06-27 | End: 2017-06-30 | Stop reason: HOSPADM

## 2017-06-27 RX ORDER — PROPOFOL 10 MG/ML
INJECTION, EMULSION INTRAVENOUS AS NEEDED
Status: DISCONTINUED | OUTPATIENT
Start: 2017-06-27 | End: 2017-06-27 | Stop reason: HOSPADM

## 2017-06-27 RX ORDER — SODIUM CHLORIDE, SODIUM LACTATE, POTASSIUM CHLORIDE, CALCIUM CHLORIDE 600; 310; 30; 20 MG/100ML; MG/100ML; MG/100ML; MG/100ML
INJECTION, SOLUTION INTRAVENOUS
Status: DISCONTINUED | OUTPATIENT
Start: 2017-06-27 | End: 2017-06-27 | Stop reason: HOSPADM

## 2017-06-27 RX ORDER — POTASSIUM CHLORIDE 20 MEQ/1
40 TABLET, EXTENDED RELEASE ORAL ONCE
Status: COMPLETED | OUTPATIENT
Start: 2017-06-27 | End: 2017-06-27

## 2017-06-27 RX ORDER — IODIXANOL 320 MG/ML
INJECTION, SOLUTION INTRAVASCULAR AS NEEDED
Status: DISCONTINUED | OUTPATIENT
Start: 2017-06-27 | End: 2017-06-27 | Stop reason: HOSPADM

## 2017-06-27 RX ORDER — GLYCOPYRROLATE 0.2 MG/ML
INJECTION INTRAMUSCULAR; INTRAVENOUS AS NEEDED
Status: DISCONTINUED | OUTPATIENT
Start: 2017-06-27 | End: 2017-06-27 | Stop reason: HOSPADM

## 2017-06-27 RX ORDER — ACETAMINOPHEN 325 MG/1
650 TABLET ORAL
Status: DISCONTINUED | OUTPATIENT
Start: 2017-06-27 | End: 2017-06-30 | Stop reason: HOSPADM

## 2017-06-27 RX ORDER — VERAPAMIL HYDROCHLORIDE 240 MG/1
240 TABLET, FILM COATED, EXTENDED RELEASE ORAL
Status: DISCONTINUED | OUTPATIENT
Start: 2017-06-28 | End: 2017-06-29

## 2017-06-27 RX ADMIN — VECURONIUM BROMIDE FOR INJECTION 1 MG: 1 INJECTION, POWDER, LYOPHILIZED, FOR SOLUTION INTRAVENOUS at 14:45

## 2017-06-27 RX ADMIN — ASPIRIN 81 MG: 81 TABLET, COATED ORAL at 12:52

## 2017-06-27 RX ADMIN — CLOPIDOGREL BISULFATE 300 MG: 75 TABLET, FILM COATED ORAL at 17:44

## 2017-06-27 RX ADMIN — VECURONIUM BROMIDE FOR INJECTION 3 MG: 1 INJECTION, POWDER, LYOPHILIZED, FOR SOLUTION INTRAVENOUS at 14:20

## 2017-06-27 RX ADMIN — SUCCINYLCHOLINE CHLORIDE 200 MG: 20 INJECTION INTRAMUSCULAR; INTRAVENOUS at 14:21

## 2017-06-27 RX ADMIN — CEFAZOLIN 3 G: 1 INJECTION, POWDER, FOR SOLUTION INTRAMUSCULAR; INTRAVENOUS; PARENTERAL at 14:52

## 2017-06-27 RX ADMIN — METOPROLOL SUCCINATE 12.5 MG: 50 TABLET, EXTENDED RELEASE ORAL at 19:26

## 2017-06-27 RX ADMIN — PROTAMINE SULFATE 45 MG: 10 INJECTION, SOLUTION INTRAVENOUS at 16:10

## 2017-06-27 RX ADMIN — SODIUM CHLORIDE, SODIUM LACTATE, POTASSIUM CHLORIDE, CALCIUM CHLORIDE: 600; 310; 30; 20 INJECTION, SOLUTION INTRAVENOUS at 15:10

## 2017-06-27 RX ADMIN — SODIUM CHLORIDE: 9 INJECTION, SOLUTION INTRAVENOUS at 14:27

## 2017-06-27 RX ADMIN — SODIUM CHLORIDE, SODIUM LACTATE, POTASSIUM CHLORIDE, AND CALCIUM CHLORIDE 75 ML/HR: 600; 310; 30; 20 INJECTION, SOLUTION INTRAVENOUS at 12:18

## 2017-06-27 RX ADMIN — VECURONIUM BROMIDE FOR INJECTION 2 MG: 1 INJECTION, POWDER, LYOPHILIZED, FOR SOLUTION INTRAVENOUS at 15:00

## 2017-06-27 RX ADMIN — Medication 10 ML: at 21:03

## 2017-06-27 RX ADMIN — ETOMIDATE 30 MG: 2 INJECTION INTRAVENOUS at 14:20

## 2017-06-27 RX ADMIN — SODIUM CHLORIDE 10 MG/HR: 900 INJECTION, SOLUTION INTRAVENOUS at 18:42

## 2017-06-27 RX ADMIN — VECURONIUM BROMIDE FOR INJECTION 1 MG: 1 INJECTION, POWDER, LYOPHILIZED, FOR SOLUTION INTRAVENOUS at 15:41

## 2017-06-27 RX ADMIN — MORPHINE SULFATE 2 MG: 2 INJECTION, SOLUTION INTRAMUSCULAR; INTRAVENOUS at 17:40

## 2017-06-27 RX ADMIN — HEPARIN SODIUM 9000 UNITS: 1000 INJECTION, SOLUTION INTRAVENOUS; SUBCUTANEOUS at 15:24

## 2017-06-27 RX ADMIN — LEVETIRACETAM 750 MG: 250 TABLET, FILM COATED ORAL at 21:03

## 2017-06-27 RX ADMIN — FUROSEMIDE 40 MG: 10 INJECTION, SOLUTION INTRAMUSCULAR; INTRAVENOUS at 17:05

## 2017-06-27 RX ADMIN — ROSUVASTATIN CALCIUM 20 MG: 20 TABLET ORAL at 22:34

## 2017-06-27 RX ADMIN — NEOSTIGMINE METHYLSULFATE 5 MG: 1 INJECTION INTRAVENOUS at 16:18

## 2017-06-27 RX ADMIN — LIDOCAINE HYDROCHLORIDE 40 MG: 20 INJECTION, SOLUTION EPIDURAL; INFILTRATION; INTRACAUDAL; PERINEURAL at 14:20

## 2017-06-27 RX ADMIN — LIDOCAINE HYDROCHLORIDE 40 MG: 20 INJECTION, SOLUTION EPIDURAL; INFILTRATION; INTRACAUDAL; PERINEURAL at 15:35

## 2017-06-27 RX ADMIN — LIDOCAINE HYDROCHLORIDE 100 MG: 20 INJECTION, SOLUTION EPIDURAL; INFILTRATION; INTRACAUDAL; PERINEURAL at 15:28

## 2017-06-27 RX ADMIN — ONDANSETRON 4 MG: 2 INJECTION INTRAMUSCULAR; INTRAVENOUS at 16:06

## 2017-06-27 RX ADMIN — GLYCOPYRROLATE 0.8 MG: 0.2 INJECTION INTRAMUSCULAR; INTRAVENOUS at 16:18

## 2017-06-27 RX ADMIN — LISINOPRIL 20 MG: 20 TABLET ORAL at 17:45

## 2017-06-27 RX ADMIN — SODIUM CHLORIDE: 9 INJECTION, SOLUTION INTRAVENOUS at 16:11

## 2017-06-27 RX ADMIN — PROPOFOL 50 MG: 10 INJECTION, EMULSION INTRAVENOUS at 14:24

## 2017-06-27 RX ADMIN — FENTANYL CITRATE 100 MCG: 50 INJECTION, SOLUTION INTRAMUSCULAR; INTRAVENOUS at 14:20

## 2017-06-27 RX ADMIN — FUROSEMIDE 40 MG: 10 INJECTION INTRAMUSCULAR; INTRAVENOUS at 17:05

## 2017-06-27 RX ADMIN — Medication 10 ML: at 18:17

## 2017-06-27 RX ADMIN — POTASSIUM CHLORIDE 40 MEQ: 20 TABLET, EXTENDED RELEASE ORAL at 19:26

## 2017-06-27 RX ADMIN — METOPROLOL SUCCINATE 12.5 MG: 50 TABLET, EXTENDED RELEASE ORAL at 22:35

## 2017-06-27 NOTE — PROGRESS NOTES
Admission skin assessment completed with second RN and reveals the following: skin is warm and dry. Patient is s/p TAVR and has bilateral groin sites that are C/D/I. Sacrum is intact with no breakdown noted. Heels intact. Generalized edema noted +1/+2 non pitting.  Skin folds are all C/D/I

## 2017-06-27 NOTE — PROCEDURES
Brief Cardiac Procedure Note    Patient: Bam Dupont MRN: [id number]  SSN: [id number]    YOB: 1945  Age: 66 y.o. Sex: female      Date of Procedure: 6/27/2017     Pre-procedure Diagnosis: Aortic Stenosis    Post-procedure Diagnosis: s/p AVR    Procedure:   1.  US (L)FA acces  2.  US (L)FV access  3. Angio assisted access (R)FA  4. Thoracic aortography x5  5. Temp PM via LFV  6. BAV 23mm Goldsmith balloon  7.  23mm Goldsmith Romel 3 AVR    Brief Description of Procedure: See note    Performed By: Keagan Montero MD     Assistants: None    Anesthesia: Moderate Sedation    Estimated Blood Loss: Less than 10 mL      Specimens: None    Implants: None    Findings: successful transfemoral TAVR    Complications: None    Recommendations: Continue medical therapy.     Signed By: Keagan Montero MD     June 27, 2017

## 2017-06-27 NOTE — IP AVS SNAPSHOT
Current Discharge Medication List  
  
START taking these medications Dose & Instructions Dispensing Information Comments Morning Noon Evening Bedtime  
 aspirin 81 mg chewable tablet Your last dose was: Your next dose is:    
   
   
 Dose:  81 mg Take 1 Tab by mouth daily. Refills:  0  
     
   
   
   
  
 clopidogrel 75 mg Tab Commonly known as:  PLAVIX Your last dose was: Your next dose is:    
   
   
 Dose:  75 mg Take 1 Tab by mouth daily. Quantity:  30 Tab Refills:  6 CONTINUE these medications which have NOT CHANGED Dose & Instructions Dispensing Information Comments Morning Noon Evening Bedtime  
 calcium 500 mg Tab Your last dose was: Your next dose is:    
   
   
 Dose:  600 mg Take 600 mg by mouth two (2) times a day. Refills:  0 CLARITIN 10 mg tablet Generic drug:  loratadine Your last dose was: Your next dose is:    
   
   
 Dose:  10 mg Take 10 mg by mouth nightly. Refills:  0  
     
   
   
   
  
 CRESTOR 20 mg tablet Generic drug:  rosuvastatin Your last dose was: Your next dose is:    
   
   
 Dose:  20 mg Take 20 mg by mouth nightly. Refills:  0  
     
   
   
   
  
 diflunisal 500 mg Tab Commonly known as:  DOLOBID Your last dose was: Your next dose is:    
   
   
 Dose:  500 mg Take 500 mg by mouth as needed. Refills:  0 KEPPRA 750 mg tablet Generic drug:  levETIRAcetam  
   
Your last dose was: Your next dose is:    
   
   
 Dose:  750 mg Take 750 mg by mouth two (2) times a day. Refills:  0  
     
   
   
   
  
 omeprazole 40 mg capsule Commonly known as:  PRILOSEC Your last dose was: Your next dose is:    
   
   
 Dose:  40 mg Take 40 mg by mouth daily.   
 Refills:  0  
     
   
   
   
  
 OSTEO BI-FLEX PO  
   
 Your last dose was: Your next dose is:    
   
   
 Dose:  2 Tab Take 2 Tabs by mouth two (2) times a day. Refills:  0  
     
   
   
   
  
 SYNTHROID 112 mcg tablet Generic drug:  levothyroxine Your last dose was: Your next dose is: Take  by mouth Daily (before breakfast). Refills:  0  
     
   
   
   
  
 verapamil  mg ER capsule Commonly known as:  Erich De Leon Your last dose was: Your next dose is:    
   
   
 Dose:  240 mg Take 240 mg by mouth daily. Refills:  0  
     
   
   
   
  
 VITAMIN D2 50,000 unit capsule Generic drug:  ergocalciferol Your last dose was: Your next dose is:    
   
   
 Dose:  23209 Units Take 50,000 Units by mouth. Twice weekly Refills:  0 WAL-DRAM PO Your last dose was: Your next dose is:    
   
   
 Dose:  25 mg Take 25 mg by mouth as needed (dizziness). Refills:  0 Where to Get Your Medications Information on where to get these meds will be given to you by the nurse or doctor. ! Ask your nurse or doctor about these medications  
  clopidogrel 75 mg Tab

## 2017-06-27 NOTE — PROCEDURES
Sindi Mcclellan 43       Name:  Michoacano Lockhart   MR#:  [id number]   :  1945   Account #:  [de-identified]   Date of Adm:  2017       DATE OF PROCEDURE: 2017    PRIMARY CARDIOLOGIST: Herschel Moritz A. Jule More, MD.    PRIMARY CARE PHYSICIAN: Camila Salgado MD.    BRIEF HISTORY: The patient is a very pleasant 74-year-old female   who struggles with morbid obesity, diffuse multivessel   nonobstructive atherosclerotic coronary disease and progressive   aortic stenosis. Patient underwent evaluation by Dr. Natali Santa with   cardiac catheterization confirming moderate diffuse and stable   atherosclerotic coronary disease, but severe aortic stenosis   with a mean gradient of 42 mmHg in a patient who has developed   acute on chronic diastolic heart failure and progressive   shortness of breath consistent with worsening symptomatology of   aortic stenosis. She is now deemed intermediate risk for surgical   AVR and appropriate proceed with transfemoral aortic valve   replacement. PRIMARY INTERVENTIONAL CARDIOLOGIST: Esha Mei, [facility] INTERVENTIONAL CARDIOLOGIST: Zeke Peabody, MD    PRIMARY CARDIOTHORACIC SURGEON: Asad Hoang. Mahin Wilson MD    ECHOCARDIOGRAPHIC CARDIOLOGIST: Pixie Landau, MD    ANESTHESIA: General.     PROCEDURE: After informed consent, the patient underwent   induction of general anesthesia. Patient is placed supine on the   table and was prepped from the chest through the lower   extremities. Under ultrasound guidance, the left femoral vein was accessed and   a wire was placed. Following this, under ultrasound guidance,   the left femoral artery was accessed and a wire was placed. A 6-  Ivorian sheath was advanced through the right femoral artery and   a 6-Ivorian pacing sheath was advanced into the left femoral   vein.      Utilizing a 6-Ivorian left internal mammary artery catheter, pelvic   angiography was performed through the right iliofemoral system. Under direct angiographic visualization, the right common femoral   artery was accessed. Via micropuncture technique a 5-Djiboutian   microsheath was advanced. Over a 0.035 wire, a 6-Djiboutian sheath   was advanced without complications. A 5-Djiboutian balloon tipped pacing wire was then advanced through   the left venous sheath into the RV apex with excellent pacing   thresholds. This was tested with sub 1 millivolt threshold and   deemed appropriate for rapid pacing procedure. A 6-Djiboutian angled pigtail was then placed via the left femoral   artery into the right coronary cusp and thoracic aortography x2   was performed to confirm appropriate coplanar angle. After   appropriate coplanar angle was performed, we proceeded with   right femoral artery sheath insertion. The 6-Djiboutian existing sheath underwent deployment of 2 Percloses   in a preclosed fashion. Over a 0.035 wire, a 14-Djiboutian dilator   was advanced and the wire was exchanged for a Lunderquist wire. The 14-Djiboutian E sheath was then advanced into the descending   thoracic aorta without complications. The patient was systemically anticoagulated and a 6-Djiboutian AL1   catheter was advanced into the aortic root. Utilizing a straight   wire, the aortic valve was crossed and the AL1 was advanced to   the LV apex. Utilizing an Amplatz extra stiff wire, the AL1 catheter   was removed and extra stiff wire was maintained in the LV apex. All interventions were performed over the Amplatz extra stiff   wire. After confirming adequate anticoagulation a 23-mm Goldsmith balloon   was then positioned across the aortic valve. This was inflated   under rapid pacing procedure. Aortography was performed   demonstrating no evidence of leak around the 23-mm balloon and   we proceeded with 23-mm valve preparation.      After confirming appropriate orientation of the Goldsmith 23-mm   Romel 3 valve, it was then placed through the sheath into the   descending thoracic aorta. The balloon was then withdrawn and   positioned into the valve itself. The valve was then directed   across the aortic arch and across the valve. Two thoracic   aortographies were performed to confirm appropriate valve   positioning. Under rapid pacing protocol and thoracic   aortography, the valve was deployed at nominal pressures and the   balloon was withdrawn into the ascending aorta. Following initial valve deployment at nominal pressures there   appeared to be mild to moderate perivalvular leak in the   posterior area. 1 mL of additional saline was placed in the   deployment system and the valve was then positioned ventricular   across the valve and inflated under a rapid pacing sequence. Again, the balloon was removed, repeat transesophageal   echocardiogram demonstrates essential resolution of perivalvular   leak and the procedure was stopped. A 6-Jordanian pigtail was then   advanced over the extra stiff Amplatz wire and left in the left   ventricle. Simultaneous aortic left ventricular pressure   measurements were obtained demonstrating excellent diastolic   separation and no aortic valve gradient. Following completion of the procedure, the wire was removed,   repeat thoracic aortography demonstrates trivial perivalvular   leak and the procedure was stopped. The indwelling 14-Jordanian E sheath was then withdrawn. The   preclose sutures were deployed and the knots tied. This provided   excellent hemostasis in the right groin access. The left femoral   arterial sheath was then closed utilizing a Mynx closure device. Manual pressure was applied to the left femoral venous site. The patient was then transferred to the ICU hemodynamically   stable, extubated, awake and appropriate. CONCLUSIONS: Successful implantation of 23-mm Goldsmith Romel 3   bioprosthetic aortic valve via a right transfemoral approach.      Thank you for allowing us to participate in the care of this   patient. If questions or concerns, please feel free to contact   me.          MD DANA Magallon / Jose Prather   D:  06/27/2017   17:24   T:  06/27/2017   17:55   Job #:  [id number]

## 2017-06-27 NOTE — BRIEF OP NOTE
BRIEF OPERATIVE NOTE    Date of Procedure: 6/27/2017   Preoperative Diagnosis: Aortic valve stenosis, unspecified etiology [I35.0]  Postoperative Diagnosis: Aortic valve stenosis, unspecified etiology [I35.0]    Procedure(s):  TRANSCATHETER AORTIC VALVE REPLACEMENT  Surgeon(s) and Role:     * Jayleen Frazier MD - Primary     * Shakila White MD - [address] [place] [address], MD - Assisting         Assistant Staff:       Surgical Staff:  Circ-1: Diane Kirk RN  Circ-2: Jamie Chen RN  Circ-Relief: Anthony Morel RN  Perfusionist: Petrona Lipscomb  Scrub Tech-1: Poornima Fitch  Scrub Tech-2: Teo Marshall  Scrub RN-1: Adrian Kaufman RN  Radiology Technologist: Loly Suh; Layo Kelley; Sri Torres RN; Denny LANDON Negro  Event Time In   Incision Start 1505   Incision Close      Anesthesia: General   Estimated Blood Loss: minimal  Specimens: * No specimens in log *   Findings: as   Complications: none  Implants:   Implant Name Type Inv.  Item Serial No.  Lot No. LRB No. Used Action   VALVE AORTIC SAPEIN 3 23MM -- COMMAND[employer] SYS [id number] - [id number]   VALVE AORTIC SAPEIN 3 23MM -- COMMANDER SYS [id number] [employer]   N/A 1 Implanted

## 2017-06-27 NOTE — IP AVS SNAPSHOT
[address] 
[phone] Patient: Sherice Nguyen 
MRN: [id number] You are allergic to the following Allergen Reactions Flexeril (Cyclobenzaprine) Other (comments) Marcela Weaver, \"stoke like symptoms\" Recent Documentation Height Weight BMI OB Status Smoking Status 1.549 m 133.8 kg 55.74 kg/m2 Postmenopausal Never Smoker Emergency Contacts Name Discharge Info Relation Home Work Mobile [address] CAREGIVER [3] Spouse [3] [phone] About your hospitalization You were admitted on:  2017 You last received care in the:  [facility] 2 CV STEPDOWN You were discharged on:  2017 Unit phone number:  [phone] Why you were hospitalized Your primary diagnosis was:  Diastolic Chf, Acute On Chronic (Hcc) Your diagnoses also included:  S/P Tavr (Transcatheter Aortic Valve Replacement), Htn (Hypertension), Morbid Obesity (Hcc), Cva (Cerebrovascular Accident) (Hcc), Coronary Artery Disease Involving Native 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 
[phone] [facility] Cardiology On 2017 at 1:30 pm for an echo and then 2:30 for labs  [address] [place] [address] [phone] Lin Cobos MD On 8/4/2017 at 2:45 pm with Dr. Jean Carlos Alexander [address] [facility] [address] 
[phone] Philip Dillon MD   [facility] [address] [facility] [address] 
[phone] Your Appointments Friday July 07, 2017 12:15 PM EDT HOSPITAL FOLLOW-UP with Lin Cobos MD  
[facility] Cardiology ([address] [place] [address]) [address]  
[address]  
[phone] Friday August 04, 2017  1:30 PM EDT  
ECHOCARDIOGRAM with ECHO 36 [facility] ([address] [place] [address]) [address]  
[address]  
[phone] Friday August 04, 2017  2:45 PM EDT TransAortic Valve Replacement Follow up with Lin Cobos MD  
[facility] ([address] [place] [address]) [address]  
[address] [place] [address]  
[phone] Current Discharge Medication List  
  
START taking these medications Dose & Instructions Dispensing Information Comments Morning Noon Evening Bedtime  
 aspirin 81 mg chewable tablet Your last dose was: Your next dose is:    
   
   
 Dose:  81 mg Take 1 Tab by mouth daily. Refills:  0  
     
   
   
   
  
 clopidogrel 75 mg Tab Commonly known as:  PLAVIX Your last dose was: Your next dose is:    
   
   
 Dose:  75 mg Take 1 Tab by mouth daily. Quantity:  30 Tab Refills:  6 CONTINUE these medications which have NOT CHANGED Dose & Instructions Dispensing Information Comments Morning Noon Evening Bedtime  
 calcium 500 mg Tab Your last dose was: Your next dose is:    
   
   
 Dose:  600 mg Take 600 mg by mouth two (2) times a day. Refills:  0 CLARITIN 10 mg tablet Generic drug:  loratadine Your last dose was: Your next dose is:    
   
   
 Dose:  10 mg Take 10 mg by mouth nightly. Refills:  0  
     
   
   
   
  
 CRESTOR 20 mg tablet Generic drug:  rosuvastatin Your last dose was: Your next dose is:    
   
   
 Dose:  20 mg Take 20 mg by mouth nightly. Refills:  0  
     
   
   
   
  
 diflunisal 500 mg Tab Commonly known as:  DOLOBID Your last dose was: Your next dose is:    
   
   
 Dose:  500 mg Take 500 mg by mouth as needed. Refills:  0 KEPPRA 750 mg tablet Generic drug:  levETIRAcetam  
   
Your last dose was: Your next dose is:    
   
   
 Dose:  750 mg Take 750 mg by mouth two (2) times a day. Refills:  0  
     
   
   
   
  
 omeprazole 40 mg capsule Commonly known as:  PRILOSEC Your last dose was: Your next dose is:    
   
   
 Dose:  40 mg Take 40 mg by mouth daily. Refills:  0  
     
   
   
   
  
 OSTEO BI-FLEX PO Your last dose was: Your next dose is:    
   
   
 Dose:  2 Tab Take 2 Tabs by mouth two (2) times a day. Refills:  0  
     
   
   
   
  
 SYNTHROID 112 mcg tablet Generic drug:  levothyroxine Your last dose was: Your next dose is: Take  by mouth Daily (before breakfast). Refills:  0  
     
   
   
   
  
 verapamil  mg ER capsule Commonly known as:  Evins Glow Your last dose was: Your next dose is:    
   
   
 Dose:  240 mg Take 240 mg by mouth daily. Refills:  0  
     
   
   
   
  
 VITAMIN D2 50,000 unit capsule Generic drug:  ergocalciferol Your last dose was: Your next dose is:    
   
   
 Dose:  47783 Units Take 50,000 Units by mouth. Twice weekly Refills:  0 WAL-DRAM PO Your last dose was: Your next dose is:    
   
   
 Dose:  25 mg Take 25 mg by mouth as needed (dizziness). Refills:  0 Where to Get Your Medications Information on where to get these meds will be given to you by the nurse or doctor. ! Ask your nurse or doctor about these medications  
  clopidogrel 75 mg Tab Discharge Instructions Do not lift, push or pull anything heavier than 10 lbs for the next 2 weeks. You should also avoid any straining, stooping or squatting for 2 weeks. Do not drive for 1 week. If your groin site starts bleeding or oozing, apply firm pressure with a clean cloth and call [facility] Cardiology at [phone]. You should watch for signs of infection such as increasing areas of redness, fever, or purulent drainage. If you see anything concerning, please call our office. If you experience any severe pain, numbness, color change or temperature change in your leg, please return to the Emergency Room for immediate evaluation. All patients with prosthetic valves, including those who have undergone TAVR, are considered among those at highest risk for endocarditis (infection of a heart valve). You may need to take antibiotics before certain procedures to prevent infection. Please call our office before you have any dental procedures or oral surgery. Discharge Instructions Attachments/References TRANSCATHETER AORTIC VALVE REPLACEMENT (TAVR): OLDER ADULT: POST-OP (ENGLISH) HEART: GENERAL INFO (ENGLISH) HEALTHY DIET: HEART (ENGLISH) SMOKING: STOPPING (ENGLISH) SECONDHAND SMOKE (ENGLISH) Discharge Orders None [facility] Announcement We are excited to announce that we are making your provider's discharge notes available to you in [email or web address]. You will see these notes when they are completed and signed by the physician that discharged you from your recent hospital stay.   If you have any questions or concerns about any information you see in [email or web address], please call the Health Information Department where you were seen or reach out to your Primary Care Provider for more information about your plan of care. Introducing [facility]! Abi Ramos introduces SocialEngine patient portal. Now you can access parts of your medical record, email your doctor's office, and request medication refills online. 1. In your internet browser, go to [email or web address] 2. Click on the First Time User? Click Here link in the Sign In box. You will see the New Member Sign Up page. 3. Enter your SocialEngine Access Code exactly as it appears below. You will not need to use this code after youve completed the sign-up process. If you do not sign up before the expiration date, you must request a new code. · SocialEngine Access Code: [email or web address] Expires: 8/25/2017 10:27 PM 
 
4. Enter the last four digits of your Social Security Number (xxxx) and Date of Birth (mm/dd/yyyy) as indicated and click Submit. You will be taken to the next sign-up page. 5. Create a SocialEngine ID. This will be your SocialEngine login ID and cannot be changed, so think of one that is secure and easy to remember. 6. Create a SocialEngine password. You can change your password at any time. 7. Enter your Password Reset Question and Answer. This can be used at a later time if you forget your password. 8. Enter your e-mail address. You will receive e-mail notification when new information is available in [address] 9. Click Sign Up. You can now view and download portions of your medical record. 10. Click the Download Summary menu link to download a portable copy of your medical information. If you have questions, please visit the Frequently Asked Questions section of the SocialEngine website. Remember, SocialEngine is NOT to be used for urgent needs. For medical emergencies, dial 911. Now available from your iPhone and Android! General Information Please provide this summary of care documentation to your next provider.  
  
  
    
    
 Patient Signature: ____________________________________________________________ Date:  ____________________________________________________________  
  
Jojo Raheel Provider Signature:  ____________________________________________________________ Date:  ____________________________________________________________ More Information Transcatheter Aortic Valve Replacement: What to Expect at Home Your Recovery After your aortic valve is replaced, you will spend a few days in the hospital. This will help you get your energy back and make sure you are ready to go home. Most people can return to regular activities in 2 or 3 weeks. Your doctor may give you specific instructions on when you can do your normal activities again. This care sheet gives you a general idea about how long it will take for you to recover. But each person recovers at a different pace. Follow the steps below to feel better as quickly as possible. How can you care for yourself at home? Activity · Rest when you feel tired. Diet · Eat a heart-healthy diet. If you have not been eating this way, talk to your doctor. You also may want to talk to a dietitian. He or she can help you learn about healthy foods. · If your bowel movements are not regular right after the procedure, try to avoid constipation and straining. Drink plenty of water. Your doctor may suggest fiber, a stool softener, or a mild laxative. Medicines · Your doctor will tell you if and when you can restart your medicines. He or she will also give you instructions about taking any new medicines. · If you take blood thinners, such as warfarin (Coumadin), clopidogrel (Plavix), or aspirin, be sure to talk to your doctor. He or she will tell you if and when to start taking those medicines again. Make sure that you understand exactly what your doctor wants you to do. · Your doctor will likely prescribe blood-thinning medicines.  Be sure to get instructions about how to take your medicine safely. Blood thinners can cause serious bleeding problems. · Be safe with medicines. Take your medicines exactly as prescribed. Call your doctor if you think you are having a problem with your medicine. Other · Be sure to tell all of your doctors and your dentist that you have an artificial aortic valve. This is important because you may need to take antibiotics before certain procedures to prevent infection. Follow-up care is a key part of your treatment and safety. Be sure to make and go to all appointments, and call your doctor if you are having problems. It's also a good idea to know your test results and keep a list of the medicines you take. When should you call for help? Call 911 anytime you think you may need emergency care. For example, call if: 
· You passed out (lost consciousness). · You have symptoms of a heart attack. These may include: ¨ Chest pain or pressure, or a strange feeling in the chest. 
¨ Sweating. ¨ Shortness of breath. ¨ Nausea or vomiting. ¨ Pain, pressure, or a strange feeling in the back, neck, jaw, or upper belly or in one or both shoulders or arms. ¨ A fast or irregular heartbeat. After you call 911, the  may tell you to chew 1 adult-strength or 2 to 4 low-dose aspirin. Wait for an ambulance. Do not try to drive yourself. Call your doctor now or seek immediate medical care if: 
· You are bleeding from the area where the catheter was put in your artery. · You have a fast-growing, painful lump at the catheter site. · You have signs of infection, such as: 
¨ Increased pain, swelling, warmth, or redness. ¨ Red streaks leading from the catheter site. ¨ Pus draining from the catheter site. ¨ A fever. · Your leg or arm looks blue or feels cold, numb, or tingly. Watch closely for changes in your health, and be sure to contact your doctor if you have any problems. Where can you learn more? Go to http://jose-nuria.info/. Enter P870 in the search box to learn more about \"Transcatheter Aortic Valve Replacement: What to Expect at Home. \" Current as of: May 4, 2017 Content Version: 11.3 © 1724-9971 [employer]. Care instructions adapted under license by [employer] (which disclaims liability or warranty for this information). If you have questions about a medical condition or this instruction, always ask your healthcare professional. [place] [address] any warranty or liability for your use of this information. A Healthy Heart: Care Instructions Your Care Instructions Heart disease occurs when a substance called plaque builds up in the vessels that supply oxygen-rich blood to your heart. This can narrow the blood vessels and reduce blood flow. A heart attack happens when blood flow is completely blocked. A high-fat diet, smoking, and other factors increase the risk of heart disease. Your doctor has found that you have a chance of having heart disease. You can do lots of things to keep your heart healthy. It may not be easy, but you can change your diet, exercise more, and quit smoking. These steps really work to lower your chance of heart disease. Follow-up care is a key part of your treatment and safety. Be sure to make and go to all appointments, and call your doctor if you are having problems. It's also a good idea to know your test results and keep a list of the medicines you take. How can you care for yourself at home? Diet · Use less salt when you cook and eat. This helps lower your blood pressure. Taste food before salting. Add only a little salt when you think you need it. With time, your taste buds will adjust to less salt. · Eat fewer snack items, fast foods, canned soups, and other high-salt, high-fat, processed foods. · Read food labels and try to avoid saturated and trans fats.  They increase your risk of heart disease by raising cholesterol levels. · Limit the amount of solid fat-butter, margarine, and shortening-you eat. Use olive, peanut, or canola oil when you cook. Bake, broil, and steam foods instead of frying them. · Eating fish can lower your risk for heart disease. Eat at least 2 servings of fish a week. [place], mackerel, herring, sardines, and chunk light tuna are very good choices. These fish contain omega-3 fatty acids. · Eat a variety of fruit and vegetables every day. Dark green, deep orange, red, or yellow fruits and vegetables are especially good for you. Examples include spinach, carrots, peaches, and berries. · Foods high in fiber can reduce your cholesterol and provide important vitamins and minerals. High-fiber foods include whole-grain cereals and breads, oatmeal, beans, brown rice, citrus fruits, and apples. · Limit drinks and foods with added sugar. These include candy, desserts, and soda pop. Lifestyle changes · If your doctor recommends it, get more exercise. Walking is a good choice. Bit by bit, increase the amount you walk every day. Try for at least 30 minutes on most days of the week. You also may want to swim, bike, or do other activities. · Do not smoke. If you need help quitting, talk to your doctor about stop-smoking programs and medicines. These can increase your chances of quitting for good. Quitting smoking may be the most important step you can take to protect your heart. It is never too late to quit. You will get health benefits right away. · Limit alcohol to 2 drinks a day for men and 1 drink a day for women. Too much alcohol can cause health problems. Medicines · Take your medicines exactly as prescribed. Call your doctor if you think you are having a problem with your medicine. · If your doctor recommends aspirin, take the amount directed each day.  Make sure you take aspirin and not another kind of pain reliever, such as acetaminophen (Tylenol). If you take ibuprofen (such as Advil or Motrin) for other problems, take aspirin at least 2 hours before taking ibuprofen. When should you call for help? Call 911 if you have symptoms of a heart attack. These may include: 
· You have chest pain or pressure. This may occur with: 
¨ Chest pain or pressure, or a strange feeling in the chest. 
¨ Sweating. ¨ Shortness of breath. ¨ Pain, pressure, or a strange feeling in the back, neck, jaw, or upper belly or in one or both shoulders or arms. ¨ Lightheadedness or sudden weakness. ¨ A fast or irregular heartbeat. After you call 911, the  may tell you to chew 1 adult-strength or 2 to 4 low-dose aspirin. Wait for an ambulance. Do not try to drive yourself. Watch closely for changes in your health, and be sure to contact your doctor if: 
· Your symptoms are slowly getting worse. · You do not get better as expected. Where can you learn more? Go to http://jose-nuria.info/. Enter I993 in the search box to learn more about \"A Healthy Heart: Care Instructions. \" Current as of: April 3, 2017 Content Version: 11.3 © 9667-6324 [employer]. Care instructions adapted under license by [employer] (which disclaims liability or warranty for this information). If you have questions about a medical condition or this instruction, always ask your healthcare professional. [place] [address] any warranty or liability for your use of this information. Heart-Healthy Diet: Care Instructions Your Care Instructions A heart-healthy diet has lots of vegetables, fruits, nuts, beans, and whole grains, and is low in salt. It limits foods that are high in saturated fat, such as meats, cheeses, and fried foods. It may be hard to change your diet, but even small changes can lower your risk of heart attack and heart disease. Follow-up care is a key part of your treatment and safety. Be sure to make and go to all appointments, and call your doctor if you are having problems. It's also a good idea to know your test results and keep a list of the medicines you take. How can you care for yourself at home? Watch your portions · Learn what a serving is. A \"serving\" and a \"portion\" are not always the same thing. Make sure that you are not eating larger portions than are recommended. For example, a serving of pasta is ½ cup. A serving size of meat is 2 to 3 ounces. A 3-ounce serving is about the size of a deck of cards. Measure serving sizes until you are good at [place]" them. Keep in mind that restaurants often serve portions that are 2 or 3 times the size of one serving. · To keep your energy level up and keep you from feeling hungry, eat often but in smaller portions. · Eat only the number of calories you need to stay at a healthy weight. If you need to lose weight, eat fewer calories than your body burns (through exercise and other physical activity). Eat more fruits and vegetables · Eat a variety of fruit and vegetables every day. Dark green, deep orange, red, or yellow fruits and vegetables are especially good for you. Examples include spinach, carrots, peaches, and berries. · Keep carrots, celery, and other veggies handy for snacks. Buy fruit that is in season and store it where you can see it so that you will be tempted to eat it. · Cook dishes that have a lot of veggies in them, such as stir-fries and soups. Limit saturated and trans fat · Read food labels, and try to avoid saturated and trans fats. They increase your risk of heart disease. Trans fat is found in many processed foods such as cookies and crackers. · Use olive or canola oil when you cook. Try cholesterol-lowering spreads, such as Benecol or Take Control. · Bake, broil, grill, or steam foods instead of frying them. · Choose lean meats instead of high-fat meats such as hot dogs and sausages. Cut off all visible fat when you prepare meat. · Eat fish, skinless poultry, and meat alternatives such as soy products instead of high-fat meats. Soy products, such as tofu, may be especially good for your heart. · Choose low-fat or fat-free milk and dairy products. Eat fish · Eat at least two servings of fish a week. Certain fish, such as salmon and tuna, contain omega-3 fatty acids, which may help reduce your risk of heart attack. Eat foods high in fiber · Eat a variety of grain products every day. Include whole-grain foods that have lots of fiber and nutrients. Examples of whole-grain foods include oats, whole wheat bread, and brown rice. · Buy whole-grain breads and cereals, instead of white bread or pastries. Limit salt and sodium · Limit how much salt and sodium you eat to help lower your blood pressure. · Taste food before you salt it. Add only a little salt when you think you need it. With time, your taste buds will adjust to less salt. · Eat fewer snack items, fast foods, and other high-salt, processed foods. Check food labels for the amount of sodium in packaged foods. · Choose low-sodium versions of canned goods (such as soups, vegetables, and beans). Limit sugar · Limit drinks and foods with added sugar. These include candy, desserts, and soda pop. Limit alcohol · Limit alcohol to no more than 2 drinks a day for men and 1 drink a day for women. Too much alcohol can cause health problems. When should you call for help? Watch closely for changes in your health, and be sure to contact your doctor if: 
· You would like help planning heart-healthy meals. Where can you learn more? Go to http://jose-nuria.info/. Enter V137 in the search box to learn more about \"Heart-Healthy Diet: Care Instructions. \" Current as of: April 3, 2017 Content Version: 11.3 © 0921-8443 [employer]. Care instructions adapted under license by [facility] (which disclaims liability or warranty for this information). If you have questions about a medical condition or this instruction, always ask your healthcare professional. [address] any warranty or liability for your use of this information. Stopping Smoking: Care Instructions Your Care Instructions Cigarette smokers crave the nicotine in cigarettes. Giving it up is much harder than simply changing a habit. Your body has to stop craving the nicotine. It is hard to quit, but you can do it. There are many tools that people use to quit smoking. You may find that combining tools works best for you. There are several steps to quitting. First you get ready to quit. Then you get support to help you. After that, you learn new skills and behaviors to become a nonsmoker. For many people, a necessary step is getting and using medicine. Your doctor will help you set up the plan that best meets your needs. You may want to attend a smoking cessation program to help you quit smoking. When you choose a program, look for one that has proven success. Ask your doctor for ideas. You will greatly increase your chances of success if you take medicine as well as get counseling or join a cessation program. 
Some of the changes you feel when you first quit tobacco are uncomfortable. Your body will miss the nicotine at first, and you may feel short-tempered and grumpy. You may have trouble sleeping or concentrating. Medicine can help you deal with these symptoms. You may struggle with changing your smoking habits and rituals. The last step is the tricky one: Be prepared for the smoking urge to continue for a time. This is a lot to deal with, but keep at it. You will feel better. Follow-up care is a key part of your treatment and safety.  Be sure to make and go to all appointments, and call your doctor if you are having problems. Its also a good idea to know your test results and keep a list of the medicines you take. How can you care for yourself at home? · Ask your family, friends, and coworkers for support. You have a better chance of quitting if you have help and support. · Join a support group, such as Nicotine Anonymous, for people who are trying to quit smoking. · Consider signing up for a smoking cessation program, such as the American Lung Association's Freedom from Smoking program. 
· Set a quit date. Pick your date carefully so that it is not right in the middle of a big deadline or stressful time. Once you quit, do not even take a puff. Get rid of all ashtrays and lighters after your last cigarette. Clean your house and your clothes so that they do not smell of smoke. · Learn how to be a nonsmoker. Think about ways you can avoid those things that make you reach for a cigarette. ¨ Avoid situations that put you at greatest risk for smoking. For some people, it is hard to have a drink with friends without smoking. For others, they might skip a coffee break with coworkers who smoke. ¨ Change your daily routine. Take a different route to work or eat a meal in a different place. · Cut down on stress. Calm yourself or release tension by doing an activity you enjoy, such as reading a book, taking a hot bath, or gardening. · Talk to your doctor or pharmacist about nicotine replacement therapy, which replaces the nicotine in your body. You still get nicotine but you do not use tobacco. Nicotine replacement products help you slowly reduce the amount of nicotine you need. These products come in several forms, many of them available over-the-counter: ¨ Nicotine patches ¨ Nicotine gum and lozenges ¨ Nicotine inhaler · Ask your doctor about bupropion (Wellbutrin) or varenicline (Chantix), which are prescription medicines. They do not contain nicotine. They help you by reducing withdrawal symptoms, such as stress and anxiety. · Some people find hypnosis, acupuncture, and massage helpful for ending the smoking habit. · Eat a healthy diet and get regular exercise. Having healthy habits will help your body move past its craving for nicotine. · Be prepared to keep trying. Most people are not successful the first few times they try to quit. Do not get mad at yourself if you smoke again. Make a list of things you learned and think about when you want to try again, such as next week, next month, or next year. Where can you learn more? Go to http://joseSkyPhrasenuria.info/. Enter T224 in the search box to learn more about \"Stopping Smoking: Care Instructions. \" Current as of: March 20, 2017 Content Version: 11.3 © 4400-1790 [employer]. Care instructions adapted under license by [employer] (which disclaims liability or warranty for this information). If you have questions about a medical condition or this instruction, always ask your healthcare professional. [address] any warranty or liability for your use of this information. Secondhand Smoke: Care Instructions Your Care Instructions Secondhand smoke comes from the burning end of a cigarette, cigar, or pipe and the smoke that a smoker exhales. The smoke contains nicotine and many other harmful chemicals. Breathing secondhand smoke can cause or worsen health problems including cancer, asthma, coronary artery disease, and respiratory infections. It can make your eyes and nose burn and cause a sore throat. Secondhand smoke is especially bad for babies and young children whose lungs are still developing.  Babies whose parents smoke are more likely to have ear infections, pneumonia, and bronchitis in the first few years of their lives. Secondhand smoke can make asthma symptoms worse in children. If you are pregnant, it is important that you not smoke and that you avoid secondhand smoke. You are more likely to give birth to a baby who weighs less than expected (low birth weight) if you smoke. And your baby may have a greater risk for sudden infant death syndrome (SIDS). Babies whose mothers are exposed to secondhand smoke during pregnancy have a higher risk for health problems. Follow-up care is a key part of your treatment and safety. Be sure to make and go to all appointments, and call your doctor if you are having problems. It's also a good idea to know your test results and keep a list of the medicines you take. How can you care for yourself at home? · Do not smoke or let anyone else smoke in your home. If people must smoke, ask them to go outside. · If people do smoke in your home, choose a room where you can open a window or use a fan to get the smoke outside. · Do not let anyone smoke in your car. If someone must smoke, pull over in a safe place and let him or her smoke away from the car. · Ask your employer to make sure that you have a smoke-free work area. · Make sure that your children are not exposed to secondhand smoke at day care, school, and after-school programs. · Try to choose nonsmoking bars, restaurants, and other public places when you go out. · Help your family and friends who smoke to quit by encouraging them to try. Tell them about treatment resources. Having support from others often helps. · If you smoke, quit. Quitting is hard, but there are ways to boost your chance of quitting tobacco for good. ¨ Use nicotine gum, patches, or lozenges. Call a quitline. Ask your doctor about stop-smoking programs and medicines. ¨ Keep trying. When should you call for help? Watch closely for changes in your health, and be sure to contact your doctor if you have any problems. Where can you learn more? Go to http://jose-nuria.info/. Enter L004 in the search box to learn more about \"Secondhand Smoke: Care Instructions. \" Current as of: March 20, 2017 Content Version: 11.3 © 0864-8297 [employer]. Care instructions adapted under license by [employer] (which disclaims liability or warranty for this information). If you have questions about a medical condition or this instruction, always ask your healthcare professional. [address] any warranty or liability for your use of this information.

## 2017-06-27 NOTE — PROGRESS NOTES
Bedside shift change report to Vince johnson RN. Bilateral groin sites stable. Pulses palpable. Neuro intact. NSR with rate 60s. Cardene currently at 10 mg/hr.

## 2017-06-27 NOTE — ANESTHESIA POSTPROCEDURE EVALUATION
Post-Anesthesia Evaluation and Assessment    Patient: Erin Lentz MRN: [id number]  SSN: [id number]    YOB: 1945  Age: 66 y.o. Sex: female       Cardiovascular Function/Vital Signs  Visit Vitals    /54    Pulse 74    Temp 35.6 °C (96 °F)    Resp 30    Ht 5' 1\" (1.549 m)    Wt 133.1 kg (293 lb 8 oz)    SpO2 98%    BMI 55.46 kg/m2       Patient is status post general anesthesia for Procedure(s):  TRANSCATHETER AORTIC VALVE REPLACEMENT. Nausea/Vomiting: None    Postoperative hydration reviewed and adequate. Pain:  Pain Scale 1: Numeric (0 - 10) (06/27/17 1111)  Pain Intensity 1: 0 (06/27/17 1111)   Managed    Neurological Status:   Neuro (WDL): Within Defined Limits (06/27/17 1133)  Neuro  Neurologic State: Alert (06/27/17 1652)  Orientation Level: Oriented X4 (06/27/17 1652)  Cognition: Appropriate decision making; Appropriate for age attention/concentration; Appropriate safety awareness (06/27/17 1652)  Speech: Clear (06/27/17 1652)  Assessment L Pupil: Brisk;Round (06/27/17 1652)  Size L Pupil (mm): 2 (06/27/17 1652)  Assessment R Pupil: Brisk;Round (06/27/17 1652)  Size R Pupil (mm): 2 (06/27/17 1652)  LUE Motor Response: Purposeful;Weak (06/27/17 1652)  LLE Motor Response: Purposeful;Weak (06/27/17 1652)  RUE Motor Response: Purposeful;Weak (06/27/17 1652)  RLE Motor Response: Purposeful;Weak (06/27/17 1652)   At baseline    Mental Status and Level of Consciousness: Arousable    Pulmonary Status:   O2 Device: Nasal cannula (06/27/17 1652)   Adequate oxygenation and airway patent    Complications related to anesthesia: None    Post-anesthesia assessment completed.  No concerns    Signed By: Jennifer Gutiérrez MD     June 27, 2017

## 2017-06-27 NOTE — OP NOTES
Operative Report    Patient: Bam Dupont MRN: [id number]  SSN: [id number]    YOB: 1945  Age: 66 y.o. Sex: female       Date of Surgery: 6/27/2017     Preoperative Diagnosis: Aortic valve stenosis, unspecified etiology [I35.0]     Postoperative Diagnosis: Aortic valve stenosis, unspecified etiology [I35.0]     Primary Cardiothoracic Surgeon: Argentina Evans MD     Primary Interventional Cardiologist: Zelda Bone MD    Anesthesia: General     Procedure: Procedure(s):  TRANSCATHETER AORTIC VALVE REPLACEMENT   Transcatheter aortic valve replacement (23 mm Goldsmith Romel 3 transcatheter aortic valve via right common femoral artery percutaneous approach). Findings:  Successful deployment of a 23 mm Goldsmith Romel 3 transcatheter aortic valve via right common femoral artery percutaneous approach. normal LV function with trace perivalvular regurgitation. The patient was in normal sinus rhythm at the conclusion of the procedure. Indication for Procedure: The patient is a 66 y.o. female with symptomatic severe aortic stenosis. She was not felt to be a candidate for surgical aortic valve replacement and after discussion at the multidisciplinary valve board transcatheter aortic valve replacement was recommended. After a thorough discussion of all the risks and proposed benefits, the patient agreed to proceed. Procedure in Detail:   Patient premedicated and brought to the operating suite. Time-out performed. Standard monitoring lines placed, and the patient was intubated and placed under general anesthesia without event. Transesophageal echocardiogram was performed, confirming the above valve pathology. Intravenous cefazolin was administered. Patient prepped and draped in standard, meticulous surgical fashion. Ioban drapes were used.     Access to the left common femoral artery and vein was obtained under ultrasound guidance, and 6-German sheaths were placed.  A temporary transvenous right ventricular pacing wire was positioned via the femoral vein. Next, a micropuncture kit was used to gain access to the right common femoral artery and a 6-Belizean sheath was placed. This was upsized and the 14-Belizean Goldsmith transcatheter heart valve sheath was placed after deploying Perclose devices in a pre-close fashion.      Next, a balloon valvuloplasty was performed. The patient remained hemodynamically stable. The 23mm Romel 3 valve was then prepped and mounted onto the delivery system in the correct orientation. This was then positioned in the ascending aorta, and then advanced across the aortic valve and deployed successfully. The details of this are dictated under a separate cover in Dr. Medardo Kramer report. Following deployment, an aortogram and transesophageal echocardiogram were performed, which demonstrated a trace aortic regurgitation and a well-seated, well-functioning prosthetic valve in the aortic position. The patient remained hemodynamically stable and in normal sinus rhythm. At this point, the delivery system and the sheath were removed from the right common femoral artery, and hemostasis was obtained using the Perclose sutures. Sheaths were also removed from the right common femoral artery and vein. At the conclusion of the procedure, the patient was hemodynamically stable and hemostasis was good. The patient was then transported to the CV ICU in critical but stable condition. At the end of the case, all sharp, sponge, and instrument counts were reported as being correct. Estimated Blood Loss:  minimal    Tourniquet Time: * No tourniquets in log *      Implants:   Implant Name Type Inv.  Item Serial No.  Lot No. LRB No. Used Action   VALVE AORTIC SAPEIN 3 23MM -- COMMANDER SYS [id number] - [id number]   VALVE AORTIC SAPEIN 3 23MM -- COMMANDER SYS [id number] [employer]   N/A 1 Implanted               Specimens: * No specimens in log *        Drains: None Complications: None    Counts: Sponge and needle counts were correct times two.     Signed By:  Mor Mccartney MD     June 27, 2017

## 2017-06-27 NOTE — ANESTHESIA PROCEDURE NOTES
Arterial Line Placement    Start time: 6/27/2017 1:45 PM  End time: 6/27/2017 1:43 PM  Performed by: Kemar Nevarez  Authorized by: Brandy Herrera     Pre-Procedure  Indications:  Arterial pressure monitoring and blood sampling  Preanesthetic Checklist: patient identified, risks and benefits discussed, anesthesia consent, site marked, patient being monitored, timeout performed and patient being monitored    Timeout Time: 13:45        Procedure:   Prep:  ChloraPrep  Seldinger Technique?: Yes    Orientation:  Right  Location:  Radial artery  Catheter size:  20 G  Number of attempts:  1  Cont Cardiac Output Sensor: No      Assessment:   Post-procedure:  Line secured and sterile dressing applied  Patient Tolerance:  Patient tolerated the procedure well with no immediate complications  Comment:   Right arm prepped with ChloraPrep, 0.8ml of 1% lidocaine infiltrated at skin, Seldinger technique, good blood return, good waveform.

## 2017-06-27 NOTE — PROGRESS NOTES
TRANSFER - IN REPORT:    Verbal report received from [facility]) on GRADISCH  being received from OR(unit) for routine post - op      Report consisted of patients Situation, Background, Assessment and   Recommendations(SBAR). Information from the following report(s) SBAR, Kardex, OR Summary, Procedure Summary, Intake/Output, MAR, Recent Results, Med Rec Status and Cardiac Rhythm nsr was reviewed with the receiving nurse. Opportunity for questions and clarification was provided. Assessment completed upon patients arrival to unit and care assumed.

## 2017-06-27 NOTE — PERIOP NOTES
TRANSFER - OUT REPORT:    Verbal report given to Andrae Pedersen RN on Jayesh Quintanilla  being transferred to CVICU for routine post - op       Report consisted of patients Situation, Background, Assessment and   Recommendations(SBAR). Information from the following report(s) OR Summary was reviewed with the receiving nurse. Lines:   Peripheral IV 06/27/17 Left Forearm (Active)   Site Assessment Clean, dry, & intact 6/27/2017 11:32 AM   Phlebitis Assessment 0 6/27/2017 11:32 AM   Infiltration Assessment 0 6/27/2017 11:32 AM   Dressing Status Clean, dry, & intact 6/27/2017 11:32 AM   Dressing Type Tape;Transparent 6/27/2017 11:32 AM   Hub Color/Line Status Pink; Infusing 6/27/2017 11:32 AM       Peripheral IV 06/27/17 Right Antecubital (Active)       Arterial Line 06/27/17 Right Radial artery (Active)        Opportunity for questions and clarification was provided.       Patient transported with:   Monitor

## 2017-06-28 LAB
ANION GAP BLD CALC-SCNC: 9 MMOL/L (ref 7–16)
ATRIAL RATE: 72 BPM
ATRIAL RATE: 88 BPM
BASOPHILS # BLD AUTO: 0 K/UL (ref 0–0.2)
BASOPHILS # BLD: 0 % (ref 0–2)
BUN SERPL-MCNC: 16 MG/DL (ref 8–23)
CALCIUM SERPL-MCNC: 8.6 MG/DL (ref 8.3–10.4)
CALCULATED P AXIS, ECG09: 50 DEGREES
CALCULATED P AXIS, ECG09: 66 DEGREES
CALCULATED R AXIS, ECG10: -27 DEGREES
CALCULATED R AXIS, ECG10: -31 DEGREES
CALCULATED T AXIS, ECG11: -34 DEGREES
CALCULATED T AXIS, ECG11: 43 DEGREES
CHLORIDE SERPL-SCNC: 109 MMOL/L (ref 98–107)
CO2 SERPL-SCNC: 26 MMOL/L (ref 21–32)
CREAT SERPL-MCNC: 0.95 MG/DL (ref 0.6–1)
DIAGNOSIS, 93000: NORMAL
DIAGNOSIS, 93000: NORMAL
DIFFERENTIAL METHOD BLD: ABNORMAL
EOSINOPHIL # BLD: 0 K/UL (ref 0–0.8)
EOSINOPHIL NFR BLD: 1 % (ref 0.5–7.8)
ERYTHROCYTE [DISTWIDTH] IN BLOOD BY AUTOMATED COUNT: 14.3 % (ref 11.9–14.6)
GLUCOSE SERPL-MCNC: 97 MG/DL (ref 65–100)
HCT VFR BLD AUTO: 35.2 % (ref 35.8–46.3)
HGB BLD-MCNC: 11.9 G/DL (ref 11.7–15.4)
IMM GRANULOCYTES # BLD: 0 K/UL (ref 0–0.5)
IMM GRANULOCYTES NFR BLD AUTO: 0.2 % (ref 0–5)
LYMPHOCYTES # BLD AUTO: 15 % (ref 13–44)
LYMPHOCYTES # BLD: 0.7 K/UL (ref 0.5–4.6)
MCH RBC QN AUTO: 30.1 PG (ref 26.1–32.9)
MCHC RBC AUTO-ENTMCNC: 33.8 G/DL (ref 31.4–35)
MCV RBC AUTO: 88.9 FL (ref 79.6–97.8)
MONOCYTES # BLD: 0.4 K/UL (ref 0.1–1.3)
MONOCYTES NFR BLD AUTO: 7 % (ref 4–12)
NEUTS SEG # BLD: 3.6 K/UL (ref 1.7–8.2)
NEUTS SEG NFR BLD AUTO: 77 % (ref 43–78)
P-R INTERVAL, ECG05: 140 MS
P-R INTERVAL, ECG05: 154 MS
PLATELET # BLD AUTO: 88 K/UL (ref 150–450)
PMV BLD AUTO: 10.8 FL (ref 10.8–14.1)
POTASSIUM SERPL-SCNC: 4.2 MMOL/L (ref 3.5–5.1)
Q-T INTERVAL, ECG07: 390 MS
Q-T INTERVAL, ECG07: 444 MS
QRS DURATION, ECG06: 102 MS
QRS DURATION, ECG06: 86 MS
QTC CALCULATION (BEZET), ECG08: 471 MS
QTC CALCULATION (BEZET), ECG08: 486 MS
RBC # BLD AUTO: 3.96 M/UL (ref 4.05–5.25)
SODIUM SERPL-SCNC: 144 MMOL/L (ref 136–145)
VENTRICULAR RATE, ECG03: 72 BPM
VENTRICULAR RATE, ECG03: 88 BPM
WBC # BLD AUTO: 4.8 K/UL (ref 4.3–11.1)

## 2017-06-28 PROCEDURE — 74011250636 HC RX REV CODE- 250/636: Performed by: PHYSICIAN ASSISTANT

## 2017-06-28 PROCEDURE — 36415 COLL VENOUS BLD VENIPUNCTURE: CPT | Performed by: INTERNAL MEDICINE

## 2017-06-28 PROCEDURE — 85025 COMPLETE CBC W/AUTO DIFF WBC: CPT | Performed by: INTERNAL MEDICINE

## 2017-06-28 PROCEDURE — 80048 BASIC METABOLIC PNL TOTAL CA: CPT | Performed by: INTERNAL MEDICINE

## 2017-06-28 PROCEDURE — 93312 ECHO TRANSESOPHAGEAL: CPT

## 2017-06-28 PROCEDURE — 74011250636 HC RX REV CODE- 250/636: Performed by: INTERNAL MEDICINE

## 2017-06-28 PROCEDURE — 74011250637 HC RX REV CODE- 250/637: Performed by: INTERNAL MEDICINE

## 2017-06-28 PROCEDURE — 74011250637 HC RX REV CODE- 250/637: Performed by: PHYSICIAN ASSISTANT

## 2017-06-28 PROCEDURE — 65660000004 HC RM CVT STEPDOWN

## 2017-06-28 PROCEDURE — C8929 TTE W OR WO FOL WCON,DOPPLER: HCPCS

## 2017-06-28 PROCEDURE — 93005 ELECTROCARDIOGRAM TRACING: CPT | Performed by: INTERNAL MEDICINE

## 2017-06-28 PROCEDURE — 74011000250 HC RX REV CODE- 250: Performed by: INTERNAL MEDICINE

## 2017-06-28 RX ORDER — FUROSEMIDE 40 MG/1
40 TABLET ORAL DAILY
Status: DISCONTINUED | OUTPATIENT
Start: 2017-06-29 | End: 2017-06-29

## 2017-06-28 RX ADMIN — Medication 10 ML: at 05:57

## 2017-06-28 RX ADMIN — LEVETIRACETAM 750 MG: 250 TABLET, FILM COATED ORAL at 22:13

## 2017-06-28 RX ADMIN — CLOPIDOGREL BISULFATE 75 MG: 75 TABLET, FILM COATED ORAL at 08:02

## 2017-06-28 RX ADMIN — LISINOPRIL 20 MG: 20 TABLET ORAL at 08:02

## 2017-06-28 RX ADMIN — LISINOPRIL 20 MG: 20 TABLET ORAL at 22:13

## 2017-06-28 RX ADMIN — Medication 10 ML: at 22:14

## 2017-06-28 RX ADMIN — VERAPAMIL HYDROCHLORIDE 240 MG: 240 TABLET, FILM COATED, EXTENDED RELEASE ORAL at 08:02

## 2017-06-28 RX ADMIN — LEVETIRACETAM 750 MG: 250 TABLET, FILM COATED ORAL at 09:56

## 2017-06-28 RX ADMIN — METOPROLOL SUCCINATE 25 MG: 50 TABLET, EXTENDED RELEASE ORAL at 08:03

## 2017-06-28 RX ADMIN — ONDANSETRON 4 MG: 2 INJECTION INTRAMUSCULAR; INTRAVENOUS at 13:54

## 2017-06-28 RX ADMIN — FUROSEMIDE 40 MG: 40 TABLET ORAL at 08:03

## 2017-06-28 RX ADMIN — ASPIRIN 81 MG 81 MG: 81 TABLET ORAL at 08:02

## 2017-06-28 RX ADMIN — ROSUVASTATIN CALCIUM 20 MG: 20 TABLET ORAL at 22:13

## 2017-06-28 RX ADMIN — PERFLUTREN 1 ML: 6.52 INJECTION, SUSPENSION INTRAVENOUS at 09:00

## 2017-06-28 RX ADMIN — LEVOTHYROXINE SODIUM 112 MCG: 112 TABLET ORAL at 08:02

## 2017-06-28 NOTE — PROGRESS NOTES
[facility] CARDIOLOGY PROGRESS NOTE           6/28/2017 12:19 PM    Admit Date: 6/27/2017      Subjective:   Patient with dyspnea post TAVR and acute CHF. Received lasix IV and dyspnea imprvoed. No chest pain. BP controlled. ROS:  Cardiovascular:  As noted above    Objective:      Vitals:    06/28/17 0732 06/28/17 0802 06/28/17 1000 06/28/17 1103   BP: 138/63 133/64 114/72 104/58   Pulse: 80 84 80 78   Resp: 19 19 18   Temp:    97.2 °F (36.2 °C)   SpO2: 98%  99% 98%   Weight:       Height:           Physical Exam:  General-No Acute Distress  Neck- supple, no JVD  CV- regular rate and rhythm I/VI TEENA  Lung- clear bilaterally  Abd- soft, nontender, nondistended  Ext- 1+ edema bilaterally. Skin- warm and dry      Data Review:   Recent Labs      06/28/17   0340  06/27/17   1710   NA  144  144   K  4.2  3.5   BUN  16  18   CREA  0.95  0.88   GLU  97  103*   WBC  4.8  4.0*   HGB  11.9  11.5*   HCT  35.2*  34.0*   PLT  88*  98*      No results found for: BRETT Durand    Assessment/Plan:     Principal Problem:    S/P TAVR (transcatheter aortic valve replacement) (6/27/2017)    POD # 1 after TAVR. Echo today. Mobilize. Transfer to stepdown. Active Problems:    HTN (hypertension) (3/24/2015)    BP controlled. Morbid obesity ([address] (3/24/2015)    Diet and exercise. CVA (cerebrovascular accident) ([address] (2/8/2017)    Prior event. Hyperlipidemia ()    On crestor. Coronary artery disease involving native coronary artery of native heart without angina pectoris (6/6/2017)    No angina. Diastolic CHF, acute on chronic (HCC) (6/27/2017)    Improved after IV lasix. Start PO lasix 40 mg once a day. Follow fluid status daily.                    Silvia Reid MD  6/28/2017 12:19 PM

## 2017-06-28 NOTE — PROGRESS NOTES
TRANSFER - OUT REPORT:    Verbal report given to Ashish Prado on Hemalatha Marshall  being transferred to [facility] for routine progression of care       Report consisted of patients Situation, Background, Assessment and Recommendations(SBAR). Information from the following report(s) SBAR, Kardex, MAR, Recent Results and Cardiac Rhythm NSR was reviewed with the receiving nurse. Opportunity for questions and clarification was provided.

## 2017-06-28 NOTE — PROGRESS NOTES
Bedside shift report given to Andrés White RN and [address] B[address] RN. Pt neuro intact. BIlateral groin sites stable.     Abran Ryan RN

## 2017-06-28 NOTE — PROGRESS NOTES
TRANSFER - IN REPORT:    Verbal report received from THOMAS Brown(name) on Wisam Montana  being received from CVICU(unit) for routine progression of care      Report consisted of patients Situation, Background, Assessment and   Recommendations(SBAR). Information from the following report(s) SBAR, Kardex, Procedure Summary, Intake/Output, Recent Results, Med Rec Status and Cardiac Rhythm nsr was reviewed with the receiving nurse. Opportunity for questions and clarification was provided. Assessment completed upon patients arrival to unit and care assumed. Dual skin assessment completed with RN. Sacrum visualized and is benign. No redness or breakdown noted to skin. Patient has bilateral groin sites that are clean, dry, and intact. No bleeding, swelling, or hematoma noted.

## 2017-06-28 NOTE — PROGRESS NOTES
Pt's right radial art line removed at this time. Manual pressure held until hemostasis achieved. No bleeding or hematoma at site. Pressure dressing to site. Will continue to monitor.      Aixa Duncan RN

## 2017-06-29 PROBLEM — N17.9 ACUTE RENAL FAILURE (ARF) (HCC): Status: ACTIVE | Noted: 2017-06-29

## 2017-06-29 LAB
ANION GAP BLD CALC-SCNC: 11 MMOL/L (ref 7–16)
ATRIAL RATE: 87 BPM
BASOPHILS # BLD AUTO: 0 K/UL (ref 0–0.2)
BASOPHILS # BLD: 0 % (ref 0–2)
BUN SERPL-MCNC: 30 MG/DL (ref 8–23)
CALCIUM SERPL-MCNC: 8.6 MG/DL (ref 8.3–10.4)
CALCULATED P AXIS, ECG09: 66 DEGREES
CALCULATED R AXIS, ECG10: -30 DEGREES
CALCULATED T AXIS, ECG11: 53 DEGREES
CHLORIDE SERPL-SCNC: 109 MMOL/L (ref 98–107)
CO2 SERPL-SCNC: 24 MMOL/L (ref 21–32)
CREAT SERPL-MCNC: 1.68 MG/DL (ref 0.6–1)
DIAGNOSIS, 93000: NORMAL
DIFFERENTIAL METHOD BLD: ABNORMAL
EOSINOPHIL # BLD: 0.1 K/UL (ref 0–0.8)
EOSINOPHIL NFR BLD: 3 % (ref 0.5–7.8)
ERYTHROCYTE [DISTWIDTH] IN BLOOD BY AUTOMATED COUNT: 14.4 % (ref 11.9–14.6)
GLUCOSE SERPL-MCNC: 94 MG/DL (ref 65–100)
HCT VFR BLD AUTO: 32.7 % (ref 35.8–46.3)
HGB BLD-MCNC: 11 G/DL (ref 11.7–15.4)
IMM GRANULOCYTES # BLD: 0 K/UL (ref 0–0.5)
IMM GRANULOCYTES NFR BLD AUTO: 0.4 % (ref 0–5)
LYMPHOCYTES # BLD AUTO: 27 % (ref 13–44)
LYMPHOCYTES # BLD: 1.3 K/UL (ref 0.5–4.6)
MCH RBC QN AUTO: 30 PG (ref 26.1–32.9)
MCHC RBC AUTO-ENTMCNC: 33.6 G/DL (ref 31.4–35)
MCV RBC AUTO: 89.1 FL (ref 79.6–97.8)
MONOCYTES # BLD: 0.6 K/UL (ref 0.1–1.3)
MONOCYTES NFR BLD AUTO: 12 % (ref 4–12)
NEUTS SEG # BLD: 2.8 K/UL (ref 1.7–8.2)
NEUTS SEG NFR BLD AUTO: 58 % (ref 43–78)
P-R INTERVAL, ECG05: 150 MS
PLATELET # BLD AUTO: 83 K/UL (ref 150–450)
PMV BLD AUTO: 12 FL (ref 10.8–14.1)
POTASSIUM SERPL-SCNC: 4 MMOL/L (ref 3.5–5.1)
Q-T INTERVAL, ECG07: 394 MS
QRS DURATION, ECG06: 90 MS
QTC CALCULATION (BEZET), ECG08: 474 MS
RBC # BLD AUTO: 3.67 M/UL (ref 4.05–5.25)
SODIUM SERPL-SCNC: 144 MMOL/L (ref 136–145)
VENTRICULAR RATE, ECG03: 87 BPM
WBC # BLD AUTO: 4.9 K/UL (ref 4.3–11.1)

## 2017-06-29 PROCEDURE — 36415 COLL VENOUS BLD VENIPUNCTURE: CPT | Performed by: INTERNAL MEDICINE

## 2017-06-29 PROCEDURE — 80048 BASIC METABOLIC PNL TOTAL CA: CPT | Performed by: INTERNAL MEDICINE

## 2017-06-29 PROCEDURE — 74011250637 HC RX REV CODE- 250/637: Performed by: PHYSICIAN ASSISTANT

## 2017-06-29 PROCEDURE — 74011250637 HC RX REV CODE- 250/637: Performed by: INTERNAL MEDICINE

## 2017-06-29 PROCEDURE — 85025 COMPLETE CBC W/AUTO DIFF WBC: CPT | Performed by: INTERNAL MEDICINE

## 2017-06-29 PROCEDURE — 93005 ELECTROCARDIOGRAM TRACING: CPT | Performed by: INTERNAL MEDICINE

## 2017-06-29 PROCEDURE — 74011250636 HC RX REV CODE- 250/636: Performed by: INTERNAL MEDICINE

## 2017-06-29 PROCEDURE — 65660000004 HC RM CVT STEPDOWN

## 2017-06-29 RX ORDER — CLOPIDOGREL BISULFATE 75 MG/1
75 TABLET ORAL DAILY
Qty: 30 TAB | Refills: 6 | Status: SHIPPED | OUTPATIENT
Start: 2017-06-29 | End: 2021-11-11

## 2017-06-29 RX ORDER — BISACODYL 5 MG
5 TABLET, DELAYED RELEASE (ENTERIC COATED) ORAL DAILY PRN
Status: DISCONTINUED | OUTPATIENT
Start: 2017-06-29 | End: 2017-06-30 | Stop reason: HOSPADM

## 2017-06-29 RX ORDER — VERAPAMIL HYDROCHLORIDE 180 MG/1
180 TABLET, EXTENDED RELEASE ORAL
Status: DISCONTINUED | OUTPATIENT
Start: 2017-06-30 | End: 2017-06-30 | Stop reason: HOSPADM

## 2017-06-29 RX ORDER — GUAIFENESIN 100 MG/5ML
81 LIQUID (ML) ORAL DAILY
Status: SHIPPED | COMMUNITY
Start: 2017-06-29

## 2017-06-29 RX ORDER — MORPHINE SULFATE 4 MG/ML
2 INJECTION, SOLUTION INTRAMUSCULAR; INTRAVENOUS
Status: DISCONTINUED | OUTPATIENT
Start: 2017-06-29 | End: 2017-06-30 | Stop reason: HOSPADM

## 2017-06-29 RX ORDER — SODIUM CHLORIDE 9 MG/ML
75 INJECTION, SOLUTION INTRAVENOUS CONTINUOUS
Status: DISPENSED | OUTPATIENT
Start: 2017-06-29 | End: 2017-06-29

## 2017-06-29 RX ADMIN — LEVOTHYROXINE SODIUM 112 MCG: 112 TABLET ORAL at 06:04

## 2017-06-29 RX ADMIN — ACETAMINOPHEN 650 MG: 325 TABLET ORAL at 09:14

## 2017-06-29 RX ADMIN — Medication 10 ML: at 22:02

## 2017-06-29 RX ADMIN — CLOPIDOGREL BISULFATE 75 MG: 75 TABLET, FILM COATED ORAL at 09:08

## 2017-06-29 RX ADMIN — LEVETIRACETAM 750 MG: 250 TABLET, FILM COATED ORAL at 09:08

## 2017-06-29 RX ADMIN — SODIUM CHLORIDE 75 ML/HR: 900 INJECTION, SOLUTION INTRAVENOUS at 09:01

## 2017-06-29 RX ADMIN — Medication 10 ML: at 16:55

## 2017-06-29 RX ADMIN — METOPROLOL SUCCINATE 25 MG: 50 TABLET, EXTENDED RELEASE ORAL at 11:50

## 2017-06-29 RX ADMIN — LEVETIRACETAM 750 MG: 250 TABLET, FILM COATED ORAL at 21:22

## 2017-06-29 RX ADMIN — ASPIRIN 81 MG 81 MG: 81 TABLET ORAL at 11:51

## 2017-06-29 RX ADMIN — VERAPAMIL HYDROCHLORIDE 240 MG: 240 TABLET, FILM COATED, EXTENDED RELEASE ORAL at 09:07

## 2017-06-29 RX ADMIN — ROSUVASTATIN CALCIUM 20 MG: 20 TABLET ORAL at 21:22

## 2017-06-29 RX ADMIN — Medication 10 ML: at 06:04

## 2017-06-29 NOTE — DISCHARGE SUMMARY
[address] [place] [address] 121 Cardiology Discharge Summary     Patient ID:  Alan Gonzalez  [id number]  6 y.o.  1945    Admit date: 6/27/2017    Discharge date:  6/30/2017    Admitting Physician: Brandy Ryan MD     Discharge Physician: Dr Barbara Avila    Admission Diagnoses: Aortic valve stenosis, unspecified etiology [I35.0]    Discharge Diagnoses:    Diagnosis    Acute renal failure (ARF) (HCC)    Aortic stenosis    S/P TAVR (transcatheter aortic valve replacement)    Diastolic CHF, acute on chronic (HCC)    Coronary artery disease involving native coronary artery of native heart without angina pectoris    Amyloidosis (HCC)    Autoimmune disease ([address]    Chronic obstructive pulmonary disease (HCC)    GERD (gastroesophageal reflux disease)    Stroke ([address]    Thromboembolus ([address]    Unspecified sleep apnea    Thrombocytopenia ([address]    Hypertension    Hyperlipidemia    Nonrheumatic aortic valve stenosis    CVA (cerebrovascular accident) ([address]    Atrial septal defect    Anemia    Gastroesophageal reflux disease without esophagitis    Dizziness    Seizures ([facility])    HTN (hypertension)    Hypothyroidism    Morbid obesity ([address]    Arthritis    TIA (transient ischemic attack)    MOHSEN (obstructive sleep apnea)       Cardiology Procedures this admission:  Transcatheter aortic valve replacement  Consults: None    Hospital Course: Patient was seen at the office of [address] [place] [address] Rd 121 Cardiology by Dr. aCrissa Regalado in follow up for severe aortic stenosis. She was seen by CTS and felt to be high risk for surgical AVR. The patient was felt to be an appropriate candidate for TAVR. She was admitted for planned TAVR. The patient underwent successful balloon valvuloplasty and transcatheter aortic valve replacement with a 23mm Goldsmith Romel 3 valve. The patient was monitored closely in the CVICU. She was diuresed with IV lasix. She was transferred to CV stepdown for continued monitoring.  Echocardiogram showed normal function of bioprosthesis. Her creatinine became elevated at 1.68. Diuretics were held and she was given IV fluids. The morning of 6/30/17, patient was up feeling well without any complaints of chest pain or shortness of breath. Patient's labs were stable w improved cr to 1.17. Patient was determined stable and ready for discharge. Patient was instructed on the importance of medication compliance including dual anti-platelet therapy for at least 6 months. The patient will have transitional care follow up with [address] [place] [address] 121 Cardiology Dr. Rosales Riley on July 7th at 12:15pm THE [facility]). A repeat echocardiogram will be obtained in 1 month. DISPOSITION: The patient is being discharged home in stable condition on a low saturated fat, low cholesterol and low salt diet. The patient is instructed to advance activities as tolerated to the limit of fatigue or shortness of breath. The patient is instructed to avoid lifting anything heavier than 10 lbs for 2 weeks. The patient is instructed to avoid any straining, stooping or squatting for 2 weeks. The patient is instructed not to drive for 1 week. The patient is instructed to watch the groin site for bleeding/oozing; if seen, the patient is instructed to apply firm pressure with a clean cloth and call [address] [place] [address] 121 Cardiology at [phone]. The patient is instructed to watch for signs of infection which include: increasing area of redness, fever/hot to touch or purulent drainage at the groin site. The patient is instructed not to soak in a bathtub for 7-10 days, but is cleared to shower. The patient is instructed to return to the ER immediately for any severe pain, color change, or temperature change in leg. The patient is informed that prophylaxis for bacterial endocarditis is recommended for high-risk procedures such as all dental procedures that involve manipulation of gingival tissue, the periapical region of teeth, or perforation of the oral mucosa. Discharge Exam:   Visit Vitals    /56    Pulse 78    Temp 98.2 °F (36.8 °C)    Resp 18    Ht 5' 1\" (1.549 m)    Wt 132.9 kg (293 lb)    SpO2 92%    BMI 55.36 kg/m2       Physical Exam:  General: Well Developed, Obese, No Acute Distress, Alert & Oriented  Neck: supple, no JVD  Heart: S1S2 with RRR  Lungs: Clear throughout auscultation bilaterally  Abd: soft, nontender, nondistended, with good bowel sounds  Ext: warm, no edema, calves supple/nontender, pulses 2+ bilaterally  Skin: warm and dry      Recent Results (from the past 24 hour(s))   METABOLIC PANEL, BASIC    Collection Time: 06/30/17  5:01 AM   Result Value Ref Range    Sodium 141 136 - 145 mmol/L    Potassium 4.2 3.5 - 5.1 mmol/L    Chloride 110 (H) 98 - 107 mmol/L    CO2 23 21 - 32 mmol/L    Anion gap 8 7 - 16 mmol/L    Glucose 85 65 - 100 mg/dL    BUN 33 (H) 8 - 23 MG/DL    Creatinine 1.17 (H) 0.6 - 1.0 MG/DL    GFR est AA 58 (L) >60 ml/min/1.73m2    GFR est non-AA 48 (L) >60 ml/min/1.73m2    Calcium 7.9 (L) 8.3 - 10.4 MG/DL   CBC WITH AUTOMATED DIFF    Collection Time: 06/30/17  5:01 AM   Result Value Ref Range    WBC 4.2 (L) 4.3 - 11.1 K/uL    RBC 3.58 (L) 4.05 - 5.25 M/uL    HGB 10.8 (L) 11.7 - 15.4 g/dL    HCT 31.7 (L) 35.8 - 46.3 %    MCV 88.5 79.6 - 97.8 FL    MCH 30.2 26.1 - 32.9 PG    MCHC 34.1 31.4 - 35.0 g/dL    RDW 14.4 11.9 - 14.6 %    PLATELET 72 (L) 687 - 450 K/uL    MPV 12.7 10.8 - 14.1 FL    DF AUTOMATED      NEUTROPHILS 58 43 - 78 %    LYMPHOCYTES 28 13 - 44 %    MONOCYTES 10 4.0 - 12.0 %    EOSINOPHILS 4 0.5 - 7.8 %    BASOPHILS 0 0.0 - 2.0 %    IMMATURE GRANULOCYTES 0.5 0.0 - 5.0 %    ABS. NEUTROPHILS 2.3 1.7 - 8.2 K/UL    ABS. LYMPHOCYTES 1.2 0.5 - 4.6 K/UL    ABS. MONOCYTES 0.4 0.1 - 1.3 K/UL    ABS. EOSINOPHILS 0.2 0.0 - 0.8 K/UL    ABS. BASOPHILS 0.0 0.0 - 0.2 K/UL    ABS. IMM.  GRANS. 0.0 0.0 - 0.5 K/UL   EKG, 12 LEAD, INITIAL    Collection Time: 06/30/17  6:02 AM   Result Value Ref Range    Ventricular Rate 84 BPM Atrial Rate 84 BPM    P-R Interval 184 ms    QRS Duration 102 ms    Q-T Interval 386 ms    QTC Calculation (Bezet) 456 ms    Calculated P Axis 66 degrees    Calculated R Axis -23 degrees    Calculated T Axis 51 degrees    Diagnosis       Sinus rhythm with marked sinus arrhythmia with Premature atrial complexes  Voltage criteria for left ventricular hypertrophy  Nonspecific T wave abnormality  Abnormal ECG  When compared with ECG of 29-JUN-2017 06:13,  Premature ventricular complexes are no longer Present  Premature atrial complexes are now Present           Patient Instructions:   Current Discharge Medication List      START taking these medications    Details   clopidogrel (PLAVIX) 75 mg tab Take 1 Tab by mouth daily. Qty: 30 Tab, Refills: 6      aspirin 81 mg chewable tablet Take 1 Tab by mouth daily. CONTINUE these medications which have NOT CHANGED    Details   levETIRAcetam (KEPPRA) 750 mg tablet Take 750 mg by mouth two (2) times a day. DIMENHYDRINATE (WAL-DRAM PO) Take 25 mg by mouth as needed (dizziness). rosuvastatin (CRESTOR) 20 mg tablet Take 20 mg by mouth nightly. levothyroxine (SYNTHROID) 112 mcg tablet Take  by mouth Daily (before breakfast). Associated Diagnoses: Essential hypertension      omeprazole (PRILOSEC) 40 mg capsule Take 40 mg by mouth daily. Associated Diagnoses: Essential hypertension      verapamil (CALAN) 240 mg capsule Take 240 mg by mouth daily. loratadine (CLARITIN) 10 mg tablet Take 10 mg by mouth nightly. diflunisal (DOLOBID) 500 mg tab Take 500 mg by mouth as needed. calcium 500 mg tab Take 600 mg by mouth two (2) times a day. ergocalciferol (VITAMIN D2) 50,000 unit capsule Take 50,000 Units by mouth. Twice weekly      GLUCOSAMINE HCL/CHONDR BURNHAM A NA (OSTEO BI-FLEX PO) Take 2 Tabs by mouth two (2) times a day.

## 2017-06-29 NOTE — PROGRESS NOTES
Bedside shift report given to Megan Pink (oncoming nurse) by Ami Viramontes RN (offgoing nurse). Bedside shift report included the following information: SBAR, Kardex, ED Summary, MAR, and Recent Results.

## 2017-06-29 NOTE — PROGRESS NOTES
[facility] CARDIOLOGY PROGRESS NOTE           6/29/2017 12:19 PM    Admit Date: 6/27/2017      Subjective:   Patient stable overnight. Worsening renal function. BP low at times. In sinus rhythm with frequent PACs. Echo with normal valve function. ROS:  Cardiovascular:  As noted above    Objective:      Vitals:    06/28/17 1909 06/28/17 2222 06/29/17 0127 06/29/17 0346   BP: 124/58 136/63  108/66   Pulse: 71 68  87   Resp: 18 16  18   Temp: 98.2 °F (36.8 °C) 98.4 °F (36.9 °C)  98.1 °F (36.7 °C)   SpO2: 97% 98%  99%   Weight:   132.9 kg (293 lb)    Height:   5' 1\" (1.549 m)        Physical Exam:  General-No Acute Distress  Neck- supple, no JVD  CV- regular rate and rhythm I/VI TEENA  Lung- clear bilaterally  Abd- soft, nontender, nondistended  Ext- Trivial edema bilaterally. Skin- warm and dry      Data Review:   Recent Labs      06/29/17   0402  06/28/17   0340   NA  144  144   K  4.0  4.2   BUN  30*  16   CREA  1.68*  0.95   GLU  94  97   WBC  4.9  4.8   HGB  11.0*  11.9   HCT  32.7*  35.2*   PLT  83*  88*      No results found for: BRETT Martinez    Assessment/Plan:     Principal Problem:    S/P TAVR (transcatheter aortic valve replacement) (6/27/2017)    POD # 2 after TAVR. Echo with normal function. Mobilize. Active Problems:    HTN (hypertension) (3/24/2015)    BP controlled. Stop vasotec due to renal failure      Morbid obesity ([address] (3/24/2015)    Diet and exercise. CVA (cerebrovascular accident) ([address] (2/8/2017)    Prior event. Hyperlipidemia ()    On crestor. Coronary artery disease involving native coronary artery of native heart without angina pectoris (6/6/2017)    No angina. Diastolic CHF, acute on chronic (HCC) (6/27/2017)    Improved after IV lasix. May be volume depleted. Stop PO lasix and vasotec. Acute renal failure  Stop lasix and vasotec. IV fluids today.    Check BMP in Mariam Rojas MD  6/29/2017 12:19 PM

## 2017-06-30 VITALS
DIASTOLIC BLOOD PRESSURE: 69 MMHG | HEART RATE: 87 BPM | WEIGHT: 293 LBS | BODY MASS INDEX: 55.32 KG/M2 | SYSTOLIC BLOOD PRESSURE: 127 MMHG | HEIGHT: 61 IN | OXYGEN SATURATION: 100 % | RESPIRATION RATE: 18 BRPM | TEMPERATURE: 99.1 F

## 2017-06-30 LAB
ABO + RH BLD: NORMAL
ANION GAP BLD CALC-SCNC: 8 MMOL/L (ref 7–16)
ATRIAL RATE: 84 BPM
BASOPHILS # BLD AUTO: 0 K/UL (ref 0–0.2)
BASOPHILS # BLD: 0 % (ref 0–2)
BLD PROD TYP BPU: NORMAL
BLOOD GROUP ANTIBODIES SERPL: NORMAL
BPU ID: NORMAL
BUN SERPL-MCNC: 33 MG/DL (ref 8–23)
CALCIUM SERPL-MCNC: 7.9 MG/DL (ref 8.3–10.4)
CALCULATED P AXIS, ECG09: 66 DEGREES
CALCULATED R AXIS, ECG10: -23 DEGREES
CALCULATED T AXIS, ECG11: 51 DEGREES
CHLORIDE SERPL-SCNC: 110 MMOL/L (ref 98–107)
CO2 SERPL-SCNC: 23 MMOL/L (ref 21–32)
CREAT SERPL-MCNC: 1.17 MG/DL (ref 0.6–1)
CROSSMATCH RESULT,%XM: NORMAL
DIAGNOSIS, 93000: NORMAL
DIFFERENTIAL METHOD BLD: ABNORMAL
EOSINOPHIL # BLD: 0.2 K/UL (ref 0–0.8)
EOSINOPHIL NFR BLD: 4 % (ref 0.5–7.8)
ERYTHROCYTE [DISTWIDTH] IN BLOOD BY AUTOMATED COUNT: 14.4 % (ref 11.9–14.6)
GLUCOSE SERPL-MCNC: 85 MG/DL (ref 65–100)
HCT VFR BLD AUTO: 31.7 % (ref 35.8–46.3)
HGB BLD-MCNC: 10.8 G/DL (ref 11.7–15.4)
IMM GRANULOCYTES # BLD: 0 K/UL (ref 0–0.5)
IMM GRANULOCYTES NFR BLD AUTO: 0.5 % (ref 0–5)
LYMPHOCYTES # BLD AUTO: 28 % (ref 13–44)
LYMPHOCYTES # BLD: 1.2 K/UL (ref 0.5–4.6)
MCH RBC QN AUTO: 30.2 PG (ref 26.1–32.9)
MCHC RBC AUTO-ENTMCNC: 34.1 G/DL (ref 31.4–35)
MCV RBC AUTO: 88.5 FL (ref 79.6–97.8)
MONOCYTES # BLD: 0.4 K/UL (ref 0.1–1.3)
MONOCYTES NFR BLD AUTO: 10 % (ref 4–12)
NEUTS SEG # BLD: 2.3 K/UL (ref 1.7–8.2)
NEUTS SEG NFR BLD AUTO: 58 % (ref 43–78)
P-R INTERVAL, ECG05: 184 MS
PLATELET # BLD AUTO: 72 K/UL (ref 150–450)
PMV BLD AUTO: 12.7 FL (ref 10.8–14.1)
POTASSIUM SERPL-SCNC: 4.2 MMOL/L (ref 3.5–5.1)
Q-T INTERVAL, ECG07: 386 MS
QRS DURATION, ECG06: 102 MS
QTC CALCULATION (BEZET), ECG08: 456 MS
RBC # BLD AUTO: 3.58 M/UL (ref 4.05–5.25)
SODIUM SERPL-SCNC: 141 MMOL/L (ref 136–145)
SPECIMEN EXP DATE BLD: NORMAL
STATUS OF UNIT,%ST: NORMAL
UNIT DIVISION, %UDIV: 0
VENTRICULAR RATE, ECG03: 84 BPM
WBC # BLD AUTO: 4.2 K/UL (ref 4.3–11.1)

## 2017-06-30 PROCEDURE — 36415 COLL VENOUS BLD VENIPUNCTURE: CPT | Performed by: INTERNAL MEDICINE

## 2017-06-30 PROCEDURE — 85025 COMPLETE CBC W/AUTO DIFF WBC: CPT | Performed by: INTERNAL MEDICINE

## 2017-06-30 PROCEDURE — 74011250637 HC RX REV CODE- 250/637: Performed by: INTERNAL MEDICINE

## 2017-06-30 PROCEDURE — 80048 BASIC METABOLIC PNL TOTAL CA: CPT | Performed by: INTERNAL MEDICINE

## 2017-06-30 PROCEDURE — 74011250637 HC RX REV CODE- 250/637: Performed by: PHYSICIAN ASSISTANT

## 2017-06-30 PROCEDURE — 93005 ELECTROCARDIOGRAM TRACING: CPT | Performed by: INTERNAL MEDICINE

## 2017-06-30 RX ORDER — LORATADINE 10 MG/1
10 TABLET ORAL DAILY
Status: DISCONTINUED | OUTPATIENT
Start: 2017-06-30 | End: 2017-06-30 | Stop reason: HOSPADM

## 2017-06-30 RX ADMIN — METOPROLOL SUCCINATE 25 MG: 50 TABLET, EXTENDED RELEASE ORAL at 08:23

## 2017-06-30 RX ADMIN — ASPIRIN 81 MG 81 MG: 81 TABLET ORAL at 08:23

## 2017-06-30 RX ADMIN — LEVOTHYROXINE SODIUM 112 MCG: 112 TABLET ORAL at 05:23

## 2017-06-30 RX ADMIN — VERAPAMIL HYDROCHLORIDE 180 MG: 180 TABLET, FILM COATED, EXTENDED RELEASE ORAL at 08:22

## 2017-06-30 RX ADMIN — LORATADINE 10 MG: 10 TABLET ORAL at 08:22

## 2017-06-30 RX ADMIN — LEVETIRACETAM 750 MG: 250 TABLET, FILM COATED ORAL at 08:22

## 2017-06-30 RX ADMIN — CLOPIDOGREL BISULFATE 75 MG: 75 TABLET, FILM COATED ORAL at 08:23

## 2017-06-30 RX ADMIN — Medication 10 ML: at 05:27

## 2017-06-30 NOTE — PROGRESS NOTES
Reviewed discharge instructions and medications with patient and spouse and gave copies and prescriptions. Gave time for questions. Transported downstairs by w/c to home. Patient stable upon discharge.

## 2017-06-30 NOTE — PROGRESS NOTES
Patient ambulated in [place] with cane. [place] steady. HR increased to 109, patient stable, not short of breath.

## 2017-06-30 NOTE — PROGRESS NOTES
[facility] CARDIOLOGY PROGRESS NOTE    6/30/2017 6:53 AM    Admit Date: 6/27/2017    Admit Diagnosis: Aortic valve stenosis, unspecified etiology [I35.0]      Subjective:   Feeling better, no overnight angina, CHF, or palpitations. Renal function back to normal s/p cessation of ACE and diuretics. BP stable. Objective:      Vitals:    06/29/17 1959 06/29/17 2248 06/30/17 0217 06/30/17 0229   BP: 136/65 139/63 127/63    Pulse: 80 77 88    Resp: 16 18 19    Temp: 98.6 °F (37 °C) 98.2 °F (36.8 °C) 98.1 °F (36.7 °C)    SpO2: 96% 97% 97%    Weight:    133.8 kg (294 lb 15.6 oz)   Height:    5' 1\" (1.549 m)       Physical Exam:  Neck- supple, no JVD  CV- regular rate and rhythm, soft TEENA RUSB  Lung- clear bilaterally  Abd- soft, nontender, nondistended  Ext- no edema, bilateral groin access sites CDI  Skin- warm and dry    Data Review:   Recent Labs      06/30/17   0501  06/29/17   0402   NA  141  144   K  4.2  4.0   BUN  33*  30*   CREA  1.17*  1.68*   GLU  85  94   WBC  4.2*  4.9   HGB  10.8*  11.0*   HCT  31.7*  32.7*   PLT  72*  83*       Assessment and Plan:     Principal Problem:    S/P TAVR (transcatheter aortic valve replacement) (6/27/2017)    POD # 3 after TAVR. Echo with normal function. Mobilize. Home later today if continues to do well. See below.      Active Problems:    HTN (hypertension)    BP controlled. Stopped vasotec due to renal failure       Morbid obesity (HCC)     Diet and exercise.         CVA (cerebrovascular accident) ([address]    Prior event.        Hyperlipidemia     On crestor.         Coronary artery disease involving native coronary artery of native heart without angina pectoris     No angina. Continue meds       Diastolic CHF, acute on chronic (HCC) (6/27/2017)    Improved after IV lasix. May be volume depleted now. Stopped PO lasix and vasotec.      Acute renal failure  Stop lasix and vasotec.   IV fluids yesterday, renal function improved    HOME LATER TODAY IF AMBULATES WITHOUT DIFFICULTY  CHECK BMP AND CBC IN 5 DAYS, FOLLOW UP WITH DR. Hanna Aviles IN 1 WEEK AS TC PATIENT.        VAIBHAV Fox MD  [facility] Cardiology  Pager [phone]

## 2017-06-30 NOTE — DISCHARGE INSTRUCTIONS
Do not lift, push or pull anything heavier than 10 lbs for the next 2 weeks. You should also avoid any straining, stooping or squatting for 2 weeks. Do not drive for 1 week. If your groin site starts bleeding or oozing, apply firm pressure with a clean cloth and call [facility] Cardiology at [phone]. You should watch for signs of infection such as increasing areas of redness, fever, or purulent drainage. If you see anything concerning, please call our office. If you experience any severe pain, numbness, color change or temperature change in your leg, please return to the Emergency Room for immediate evaluation. All patients with prosthetic valves, including those who have undergone TAVR, are considered among those at highest risk for endocarditis (infection of a heart valve). You may need to take antibiotics before certain procedures to prevent infection. Please call our office before you have any dental procedures or oral surgery.

## 2017-06-30 NOTE — PROGRESS NOTES
Called Dr. Digna Santacruz to inform him that patient has ambulated and is stable and patient is requesting to go home. Dr. Digna Santacruz ok'd to go home.

## 2017-07-01 ENCOUNTER — PATIENT OUTREACH (OUTPATIENT)
Dept: CASE MANAGEMENT | Age: 72
End: 2017-07-01

## 2017-07-02 NOTE — PROGRESS NOTES
Transition of Care Discharge Follow-up Questionnaire   Date/Time of Call:   7/1/17 10:31a   What was the patient hospitalized for? Diastolic CHF; acute on chronic      Does the patient understand his/her diagnosis and/or treatment and what happened during the hospitalization? Patient verbalizes that she understands the diagnosis and/or treatment. Did the patient receive discharge instructions? Yes     Review any discharge instructions (see notes in [facility]). Ask patient if they understand these. Do they have any questions? She does not have any questions in regards to discharge instructions. Were home services ordered (nursing, PT, OT, ST, etc.)? No       If so, has the first visit occurred? If not, why? (Assist with coordination of services if necessary.) n/a       Was any DME ordered? No     If so, has it been received? If not, why?  (Assist with coordination of arranging DME orders if necessary.)   n/a       Complete a review of all medications (new, continued and discontinued meds per the D/C instructions and medication tab in [facility]). Patient  Is not taking the following medication(s): Omprazole (local pharmacist instructed patient to avoid taking with Plavix)         Were all new prescriptions filled? If not, why?  (Assist with obtainment of medications if necessary.) yes   Does the patient understand the purpose and dosing instructions for all medications? (If patient has questions, provide explanation and education.) Yes   Does the patient have any problems in performing ADLs? (If patient is unable to perform ADLs  what is the limiting factor(s)? Do they have a support system that can assist? If no support system is present, discuss possible assistance that they may be able to obtain.) Patient owns a cane, \"but doing fine without it. \" Patient does not have any problems in performing ADLs. Patient has three son to assist as needed.           Does the patient have all follow-up appointments scheduled? 7 day f/up with PCP?    7-14 day f/up with specialist?    If f/up has not been made  what actions has the care coordinator made to accomplish this? Has transportation been arranged? [facility] follow-up should be within 7 days of discharge; all others should have PCP follow-up within 7 days of discharge; follow-ups with other specialists should be within 7-14 days of discharge.) Patient has a 7 day f/u with Dr. Zakiya Johnson on 7/7/17. Transportation will be provided by [employer]. Any other questions or concerns expressed by the patient? No questions or concerns expressed by patient to care coordinator. Schedule next appointment with MARIANA DIAZ Coordinator or refer to RN Case Manager/  per the workflow guidelines. When is care coordinators next follow-up call scheduled? If referred for CCM  what RN care manager was the referral assigned? Care coordinator will refer patient to Ambulatory Director due to CHF disposition and multiple admissions. BARRY Call Completed By:   Philip Carvalho LPN  Care Coordinator     This note will not be viewable in [address]

## 2017-07-05 ENCOUNTER — PATIENT OUTREACH (OUTPATIENT)
Dept: CASE MANAGEMENT | Age: 72
End: 2017-07-05

## 2017-07-06 ENCOUNTER — PATIENT OUTREACH (OUTPATIENT)
Dept: CASE MANAGEMENT | Age: 72
End: 2017-07-06

## 2017-07-07 ENCOUNTER — PATIENT OUTREACH (OUTPATIENT)
Dept: CASE MANAGEMENT | Age: 72
End: 2017-07-07

## 2017-07-07 PROBLEM — I50.32 CHRONIC DIASTOLIC CONGESTIVE HEART FAILURE (HCC): Status: ACTIVE | Noted: 2017-06-27

## 2017-07-07 PROBLEM — R05.9 COUGH: Status: ACTIVE | Noted: 2017-07-07

## 2017-07-07 NOTE — PROGRESS NOTES
CHF bundle patient. Patient followed up for post hospital visit today with cardiologist Dr. Chel Nava. Being referred to cardiac rehab for CHF and morbid obesity. Patient will be following up with cardiologist in one month. HTN/BP currently stable with medication regimen. Patient has agreed to once a week follow up phone call due to multiple appointments. Will follow up with patient in one week. This note will not be viewable in [address]

## 2017-07-12 DIAGNOSIS — I35.0 AORTIC VALVE STENOSIS, UNSPECIFIED ETIOLOGY: Primary | ICD-10-CM

## 2017-07-13 ENCOUNTER — PATIENT OUTREACH (OUTPATIENT)
Dept: CASE MANAGEMENT | Age: 72
End: 2017-07-13

## 2017-07-20 ENCOUNTER — PATIENT OUTREACH (OUTPATIENT)
Dept: CASE MANAGEMENT | Age: 72
End: 2017-07-20

## 2017-07-20 NOTE — PROGRESS NOTES
Patient seen by NP at [facility] yesterday and today due to lip swelling. NP made the following findings:  \"Lip swelling, started with lower lip yesterday, took benadryl, lower lip no swelling this am, upper lip swollen, denies oropharyngeal swelling, or soreness, or  dyspnea, reports hx of chronic dry cough since cardiac surgery in June, was evaluated by cardiology, took prednisone for a few days after that visitt   Respiratory: Negative for cough and shortness of breath. Cardiovascular: Negative for chest pain, palpitations and leg swelling. Gastrointestinal: Negative for abdominal pain. Musculoskeletal: Negative for back pain and neck pain. Neurological: Negative for dizziness, focal weakness and headaches. Psychiatric/Behavioral: Negative for depression and suicidal ideas. \"       Patient has orders for cardiac rehab. Denies dyspnea at this time. Will follow up in one week. This note will not be viewable in [address]

## 2017-08-04 ENCOUNTER — HOSPITAL ENCOUNTER (OUTPATIENT)
Dept: LAB | Age: 72
Discharge: HOME | End: 2017-08-04
Attending: INTERNAL MEDICINE
Payer: MEDICARE

## 2017-08-04 ENCOUNTER — PATIENT OUTREACH (OUTPATIENT)
Dept: CASE MANAGEMENT | Age: 72
End: 2017-08-04

## 2017-08-04 DIAGNOSIS — I35.0 AORTIC VALVE STENOSIS, UNSPECIFIED ETIOLOGY: ICD-10-CM

## 2017-08-04 LAB
ANION GAP BLD CALC-SCNC: 7 MMOL/L
BUN SERPL-MCNC: 23 MG/DL (ref 8–23)
CALCIUM SERPL-MCNC: 8.7 MG/DL (ref 8.3–10.4)
CHLORIDE SERPL-SCNC: 108 MMOL/L (ref 98–107)
CO2 SERPL-SCNC: 26 MMOL/L (ref 21–32)
CREAT SERPL-MCNC: 1 MG/DL (ref 0.6–1)
ERYTHROCYTE [DISTWIDTH] IN BLOOD BY AUTOMATED COUNT: 14.7 % (ref 11.9–14.6)
GLUCOSE SERPL-MCNC: 82 MG/DL (ref 65–100)
HCT VFR BLD AUTO: 36.3 % (ref 35.8–46.3)
HGB BLD-MCNC: 12 G/DL (ref 11.7–15.4)
MCH RBC QN AUTO: 30.7 PG (ref 26.1–32.9)
MCHC RBC AUTO-ENTMCNC: 33.1 G/DL (ref 31.4–35)
MCV RBC AUTO: 92.8 FL (ref 79.6–97.8)
PLATELET # BLD AUTO: 125 K/UL (ref 150–450)
PMV BLD AUTO: 11.5 FL (ref 10.8–14.1)
POTASSIUM SERPL-SCNC: 4 MMOL/L (ref 3.5–5.1)
RBC # BLD AUTO: 3.91 M/UL (ref 4.05–5.25)
SODIUM SERPL-SCNC: 141 MMOL/L (ref 136–145)
WBC # BLD AUTO: 4.5 K/UL (ref 4.3–11.1)

## 2017-08-04 PROCEDURE — 36415 COLL VENOUS BLD VENIPUNCTURE: CPT | Performed by: INTERNAL MEDICINE

## 2017-08-04 PROCEDURE — 80048 BASIC METABOLIC PNL TOTAL CA: CPT | Performed by: INTERNAL MEDICINE

## 2017-08-04 PROCEDURE — 85027 COMPLETE CBC AUTOMATED: CPT | Performed by: INTERNAL MEDICINE

## 2017-08-04 NOTE — PROGRESS NOTES
Outreached to patient in person this morning. States she feels CHF improving. States she has cardiology appt later this afternoon. Reviewed meds. Encouraged patient to begin cardiac rehab per doctor's orders and recommendations. No questions at this time. Will outreach by telephone with patient in one week. This note will not be viewable in [address]

## 2017-08-09 NOTE — PROGRESS NOTES
Jose Pineda  : 1945 [facility]  [address] [facility], [address] [place] [address],[address], [place] [address].  [phone]   [phone]       OUTPATIENT PHYSICAL THERAPY:Discontinuation Summary 2017    ICD-10: Treatment Diagnosis: Pain in left knee (M25.562); Pain in right knee (M25.561); Difficulty in walking, not elsewhere classified (R26.2)  Precautions/Allergies:   Flexeril [cyclobenzaprine]   Fall Risk Score: 4 (? 5 = High Risk)  MD Orders: referral to physical therapy; please add aquatic therapy MEDICAL/REFERRING DIAGNOSIS:  Bilateral primary osteoarthritis of knee [M17.0]  Chronic pain of both ankles [M25.571, G89.29, M25.572]   DATE OF ONSET: 17  REFERRING PHYSICIAN: Lisa Parker MD     Ms. Ruthie Barraza attended initial evaluation on 17 and cancelled 2nd session. ASSESSMENT:  Ms. Ruthie Barraza presents with complaints of back, knee, and ankle pain. She attended initial evaluation and did not return for additional physical therapy visits. Unable to assess goals or progress. PLAN:  Discontinue patient from physical therapy. GOALS: (Goals have been discussed and agreed upon with patient.)  Discharge Goals: Time Frame: 4-6 weeks UNABLE TO ASSESS  1. Pt will be independent with HEP. 2. Pt will improve 5 min walk endurance test to 500 ft for improved ADLs. 3. Pt will increase LE strength to 4+/5 for improved mobility. Rehabilitation Potential For Stated Goals: Good  Thank you for this referral,   Janet pierce, PT                HISTORY:   History of Present Injury/Illness (Reason for Referral):  Knee and back pain for years that has gotten worse. Pain in the knees, ankles, and back. She says that her blood may be producing too much protein and that could be contributing to her pain. She is seeing a hematologist for this. Ankles feel stiff in the morning. Gets stiff if sits for too long. Able to walk for 5 min before she needs to sit down.  Unable to mop floor or push the vacuum for last 2 years. GOAL for therapy: help with pain and possibly loss some weight. EXAMINATION:   Observation/Orthostatic Postural Assessment:          Pt arrived to therapy with standard cane. She presents in no apparent distress. Strength:  Manual muscle testing   Right LE Left LE   Hip flexion 4- 4-   Hip abduction in sitting 4 4   Knee flexion  4- 4-   Knee extension 4 4   Ankle DF 4+ 4+     Functional Mobility:         Gait/Ambulation:  Pt ambulates with wide base of support, decreased speed, increased right stance time, trunk lean side to side         Transfers:  Independent with transfers   Tool Used: Timed Up and Go (TUG)  Score:  Initial: 13 seconds Most Recent: X seconds (Date: -- )   Interpretation of Score: The test measures, in seconds, the time taken by an individual to stand up from a standard arm chair (seat height 46 cm [18 in], arm height 65 cm [25.6 in]), walk a distance of 3 meters (118 in, approx 10 ft), turn, walk back to the chair and sit down. If the individual takes longer than 14 seconds to complete TUG, this indicates risk for falls. ENDURANCE TEST: 5 MIN WALK TEST FOR ENDURANCE: 375 FT with no assistive device 02 after test: 98%; HR 88 bpm; 2 rest breaks taken during test     CLINICAL DECISION MAKING:   Outcome Measure: Tool Used: Lower Extremity Functional Scale (LEFS)  Score:  Initial: 19/80 Most Recent: 19/80    Interpretation of Score: 20 questions each scored on a 5 point scale with 0 representing \"extreme difficulty or unable to perform\" and 4 representing \"no difficulty\". The lower the score, the greater the functional disability. 80/80 represents no disability. Minimal detectable change is 9 points. Score 80 79-63 62-48 47-32 31-16 15-1 0   Modifier CH CI CJ CK CL CM CN     ?  Mobility - Walking and Moving Around:     - CURRENT STATUS: CL - 60%-79% impaired, limited or restricted    - GOAL STATUS: CK - 40%-59% impaired, limited or restricted    - D/C STATUS:  CL - 60%-79% impaired, limited or restricted    Medical Necessity:   · Patient is expected to demonstrate progress in strength, balance and endurance to to improve functional mobility. Reason for Services/Other Comments:  · Patient continues to require skilled intervention due to increased pain, decreased mobility.

## 2017-08-17 ENCOUNTER — HOSPITAL ENCOUNTER (OUTPATIENT)
Dept: CARDIAC REHAB | Age: 72
Discharge: HOME | End: 2017-08-17
Attending: INTERNAL MEDICINE

## 2017-08-17 NOTE — CARDIO/PULMONARY
Dear Dr. Vasu Alva: Thank you for referring your patient, Wilma Zuleta(1945), to the Cardiac Rehabilitation Program at [facility]. Mrs. Aldo Gill is a good candidate for the Rehab Program and should see improvements with regular participation. We will be addressing appropriate interventions for modifiable risk factors with your patient during the next 12 weeks. We will contact you with any issues or concerns that may arise, or you can follow your patients progress through [facility] at any time. A final summary will be sent to you when the program is completed. Again, thank you for your referral. If we can be of further assistance, please feel free to contact the Cardiopulmonary Rehab staff at [phone].

## 2017-08-22 ENCOUNTER — HOSPITAL ENCOUNTER (OUTPATIENT)
Dept: CARDIAC REHAB | Age: 72
Discharge: HOME | End: 2017-08-22
Payer: MEDICARE

## 2017-08-22 VITALS — HEIGHT: 61 IN | BODY MASS INDEX: 55.32 KG/M2 | WEIGHT: 293 LBS

## 2017-08-22 PROCEDURE — 93798 PHYS/QHP OP CAR RHAB W/ECG: CPT

## 2017-08-23 ENCOUNTER — APPOINTMENT (OUTPATIENT)
Dept: CARDIAC REHAB | Age: 72
End: 2017-08-23

## 2017-08-25 ENCOUNTER — APPOINTMENT (OUTPATIENT)
Dept: CARDIAC REHAB | Age: 72
End: 2017-08-25

## 2017-08-25 NOTE — PROGRESS NOTES
Client History:  Current Medical Diagnosis: see ITP  Pertinent Medications: see review PTA meds      Have you gained or lost any weight in the past 3 months: no     What do you attribute it to: What is your weight goal: see cardiac ITP    Have you made any changes in your eating habits since your heart problems began: increased intake of fruits and vegetables    Describe your appetite: good    Supplements/Vitamins/Herbs: no    Food allergies: canteloupe    Problems with digestion, N/V/C/D : no    Alcohol use: see cardiac ITP      Dietary recall:  Breakfast is oatmeal or [place] Yogurt and whole wheat bread with peanut butter and decaf coffee with stevia and water  Lunch is salad with grilled chicken or tuna and raw vegetables and vinegrette dressing or lite Ranch dressing  Dinner is grilled chicken, fish or turkey and brown rice or sweet potato and fresh vegetables and dried beans. Rarely snacks. May have a fruit and vegetables smoothie for lunch or bean soup with Ritz crackers. Does not add salt but uses a lot of herbs and spices. Does drink Naked Juice (420 kcal for 15 oz and fruit juice based and 30 grams of protein)  Pt drinks 48-64 oz water per day  Pt and spouse think her thyroid level not being regulated is affecting her wt and pt verbalizes she is limited with exercise. Anthropometric Data: see cardiac ITP  Ht:   Wt:   Age:  BMI:  Waist measurement:     Estimated Nutritional Needs:  Calories: 12 kcal/kg LYL=7680 kcals  Protein: 1.2-1.5gm/kg IBW=57-71gm   CHO: n/a  Fluids: 1ml/kcal      Nutrition Diagnosis: Good knowledge base related to therapeutic diet as evidenced by diet low in saturated fats and sodium and high in fiber and fish meals. Nutrition Intervention:  Reviewed lab/body comp results/goals. Reviewed heart healthy choices, fiber and sodium guidelines, label reading, wt loss tips, plate method for portion control. Affirmed that pt was choosing mostly heart healthy choices. Brainstormed for healthier lower calorie choices to replace her Naked Juice and Ritz crackers. Handouts: lab/body comp, heart healthy, grocery guide, recipes, recipe modification tips, fiber, herbs and spices    Goals: See cardiac ITP         Monitoring/Evaluation: Follow-up appointment: RD to follow up with participant during cardiac rehab sessions for questions and assessment of progression toward goals. Pt reports she will only be coming once a week.     Bruna Wood RD, LD, CDE  Phone: [phone]

## 2017-08-28 ENCOUNTER — APPOINTMENT (OUTPATIENT)
Dept: CARDIAC REHAB | Age: 72
End: 2017-08-28

## 2017-08-30 ENCOUNTER — APPOINTMENT (OUTPATIENT)
Dept: CARDIAC REHAB | Age: 72
End: 2017-08-30

## 2017-09-01 ENCOUNTER — APPOINTMENT (OUTPATIENT)
Dept: CARDIAC REHAB | Age: 72
End: 2017-09-01

## 2017-09-06 ENCOUNTER — APPOINTMENT (OUTPATIENT)
Dept: CARDIAC REHAB | Age: 72
End: 2017-09-06

## 2017-09-08 ENCOUNTER — APPOINTMENT (OUTPATIENT)
Dept: CARDIAC REHAB | Age: 72
End: 2017-09-08

## 2017-09-13 ENCOUNTER — APPOINTMENT (OUTPATIENT)
Dept: CARDIAC REHAB | Age: 72
End: 2017-09-13

## 2017-09-14 ENCOUNTER — PATIENT OUTREACH (OUTPATIENT)
Dept: CASE MANAGEMENT | Age: 72
End: 2017-09-14

## 2017-09-14 NOTE — PROGRESS NOTES
Reviewed patient's chart and followed up with patient. Appears to be improving. Cardiac rehab begins on 9/15/17. Will follow up with patient in 2 weeks. This note will not be viewable in [address]

## 2017-09-15 ENCOUNTER — HOSPITAL ENCOUNTER (OUTPATIENT)
Dept: CARDIAC REHAB | Age: 72
End: 2017-09-15

## 2017-09-18 ENCOUNTER — APPOINTMENT (OUTPATIENT)
Dept: CARDIAC REHAB | Age: 72
End: 2017-09-18

## 2017-09-20 ENCOUNTER — APPOINTMENT (OUTPATIENT)
Dept: CARDIAC REHAB | Age: 72
End: 2017-09-20

## 2017-09-22 ENCOUNTER — PATIENT OUTREACH (OUTPATIENT)
Dept: CASE MANAGEMENT | Age: 72
End: 2017-09-22

## 2017-09-22 ENCOUNTER — APPOINTMENT (OUTPATIENT)
Dept: CARDIAC REHAB | Age: 72
End: 2017-09-22

## 2017-09-22 NOTE — PROGRESS NOTES
Followed up with patient. Patient felt like she couldn't continue with cardiac rehab and it was discontinued yesterday 9/21/17. Patient didn't show up for DM education on 8/28/17. Will continue to educate and encourage patient. Will follow up with patient in one week. This note will not be viewable in [address]

## 2017-09-25 ENCOUNTER — APPOINTMENT (OUTPATIENT)
Dept: CARDIAC REHAB | Age: 72
End: 2017-09-25

## 2017-09-27 ENCOUNTER — APPOINTMENT (OUTPATIENT)
Dept: CARDIAC REHAB | Age: 72
End: 2017-09-27

## 2017-09-29 ENCOUNTER — APPOINTMENT (OUTPATIENT)
Dept: CARDIAC REHAB | Age: 72
End: 2017-09-29

## 2017-09-29 ENCOUNTER — PATIENT OUTREACH (OUTPATIENT)
Dept: CASE MANAGEMENT | Age: 72
End: 2017-09-29

## 2017-09-29 NOTE — PROGRESS NOTES
Attempted outreach for follow up - UTR no answer. Will attempt outreach again in one week. This note will not be viewable in [address]

## 2017-10-02 ENCOUNTER — APPOINTMENT (OUTPATIENT)
Dept: CARDIAC REHAB | Age: 72
End: 2017-10-02

## 2017-10-04 ENCOUNTER — APPOINTMENT (OUTPATIENT)
Dept: CARDIAC REHAB | Age: 72
End: 2017-10-04

## 2017-10-05 ENCOUNTER — PATIENT OUTREACH (OUTPATIENT)
Dept: CASE MANAGEMENT | Age: 72
End: 2017-10-05

## 2017-10-05 NOTE — PROGRESS NOTES
Ambulatory Care Coordination  Follow up phone call or home visit   Outreach type and date and time:  Phone call: 10/5/17 @ 2:00pm  Home visit:    Disease specific complaints  COPD, DM, etc    Follow up with patient CHF bundle patient      Reason for follow up: Following up since she decided not to continue with cardiac rehab          Any f/u PCP appointments or specialists appointments since last outreach: f/u appointment with her PCP on Monday 10/9/17    Any medication changes since last outreach:     No changes and denies any issues at this time. If patient has home health are there any new issues or progress being made:   n/a      New referrals to be made or follow up from any referrals (SW, Diabetes education, etc. ) No        Any other concerns/questions? None at this time. So very thankful for my call. Schedule next follow up Patient has my contact information. Agreed for me to follow up in 1 week     Miscellaneous information or concerns:    Patient states she does have some swelling/edema in lower extremities some days. She states that she does elevate her legs to help alleviate this edema. Independent with her ADLs. Instructed patient to weigh herself every day and report any 3-5pound gain to PCP due to CHF  patient states that she has been doing that. Good support system with family and they transport patient to all appointments. Patient stated that she decided to cancel cardiac rehab because she and her family have a membership at the sports club. She also wanted to pay some medical bills instead of paying for the cardiac rehab. Will continue to encourage patient and assist her in anyway if she changes her mind about cardiac rehab. Ambulatory  RN:   Srinivasa Erwin RN  [facility]      This note will not be viewable in [address]

## 2017-10-06 ENCOUNTER — APPOINTMENT (OUTPATIENT)
Dept: CARDIAC REHAB | Age: 72
End: 2017-10-06

## 2017-10-09 ENCOUNTER — APPOINTMENT (OUTPATIENT)
Dept: CARDIAC REHAB | Age: 72
End: 2017-10-09

## 2017-10-11 ENCOUNTER — APPOINTMENT (OUTPATIENT)
Dept: CARDIAC REHAB | Age: 72
End: 2017-10-11

## 2017-10-12 ENCOUNTER — PATIENT OUTREACH (OUTPATIENT)
Dept: CASE MANAGEMENT | Age: 72
End: 2017-10-12

## 2017-10-12 NOTE — PROGRESS NOTES
Ambulatory Care Coordination  Follow up phone call or home visit   Outreach type and date and time:  Phone call: 10/12/17 @ 10:30am  Home visit:    Disease specific complaints  COPD, DM, etc    f/u  CHF patient     Reason for follow up: f/u with pt          Any f/u PCP appointments or specialists appointments since last outreach:      Any medication changes since last outreach:     Meds reviewed and no changes since last assessment   If patient has home health are there any new issues or progress being made:   n/a      New referrals to be made or follow up from any referrals (SW, Diabetes education, etc. ) No     Any other concerns/questions? None at this time. Schedule next follow up in one week    `miscellaneous: Patient states she has some slight edema in her legs today, but no increase in weight at this time. States that she is cutting back on her fluids today and may have had a little extra salt. Instructed to call doctor if weight increases by 3 pounds in 24 hr period. Denies SHOB. Stated she was going to elevate her legs today. Her spouse is scheduled for dental surgery tomorrow. Patient was in a good mood and stated that overall she felt good. Ambulatory  RN:         Crista Marroquin RN  [facility]                        This note will not be viewable in [address]

## 2017-10-13 ENCOUNTER — APPOINTMENT (OUTPATIENT)
Dept: CARDIAC REHAB | Age: 72
End: 2017-10-13

## 2017-10-16 ENCOUNTER — APPOINTMENT (OUTPATIENT)
Dept: CARDIAC REHAB | Age: 72
End: 2017-10-16

## 2017-10-18 ENCOUNTER — APPOINTMENT (OUTPATIENT)
Dept: CARDIAC REHAB | Age: 72
End: 2017-10-18

## 2017-10-19 ENCOUNTER — PATIENT OUTREACH (OUTPATIENT)
Dept: CASE MANAGEMENT | Age: 72
End: 2017-10-19

## 2017-10-19 NOTE — PROGRESS NOTES
Community Care Management  Follow up Outreach Note   Outreach type: Phone call: 10/19/17 @ 2:00pm  Home visit:   Date/Time of Outreach: 10/19/17 @ 2:00pm     Reason for follow-up:   f/u  outreach phone call   Disease specific complaints/issues: s/p CHF      Patient progress towards goals set from last contact:   Patient states the edema in her legs is better this week-, no increase in weight at this time. States that she is still watching her fluids intake and limiting her salt intake. Verbalized understanding to weigh daily and to call doctor if weight increases by 3 pounds in 24 hr period. Denies SHOB. Her spouse had dental surgery last week and is doing well. Patient was in a good mood and stated that overall she felt good. Has patient attended any PCP or specialist follow-up appointments since last contact? What was outcome of appointment? When is next follow-up scheduled? No      Review medications. Any medication changes since last outreach? Does patient have any questions or issues related to their medications? No changes     No issues or questions     Home health active? If yes  any issue? Progress? n/a   Referrals needed?  (SW, Diabetes education, HH, etc. ) None   Other issues/Miscellaneous? (Transportation, access to meals, ability to perform ADLs, adequate caregiver support, etc.) None reported at this time   Next Outreach Scheduled: In one week     Next Steps/Goals:   CHF - Continue daily weights  limiting fluid and salt intake. Continue to report that edema in lower extremities has improved     Community Care Manager: Fátima Kerr RN CCM          This note will not be viewable in [address]

## 2017-10-20 ENCOUNTER — APPOINTMENT (OUTPATIENT)
Dept: CARDIAC REHAB | Age: 72
End: 2017-10-20

## 2017-10-23 ENCOUNTER — APPOINTMENT (OUTPATIENT)
Dept: CARDIAC REHAB | Age: 72
End: 2017-10-23

## 2017-10-25 ENCOUNTER — APPOINTMENT (OUTPATIENT)
Dept: CARDIAC REHAB | Age: 72
End: 2017-10-25

## 2017-10-26 ENCOUNTER — PATIENT OUTREACH (OUTPATIENT)
Dept: CASE MANAGEMENT | Age: 72
End: 2017-10-26

## 2017-10-26 NOTE — PROGRESS NOTES
Community Care Management  Follow up Outreach Note   Date/Time of Outreach: Phone call: 10/26/17 @ 10:00 am  Home visit:     Reason for follow-up:   f/u phone call    Disease specific complaints/issues: CHF bundle patient       Patient progress towards goals set from last contact:     Patient states the edema in her legs is continuing to improve this week-  Continuing daily weights   States that she is still limiting her fluid intake   Limiting her salt intake. Verbalized understanding to call doctor if weight increases by 3 pounds in 24 hr period. Denies     Spouse doing well from dental surgery 2 weeks ago     Has patient attended any PCP or specialist follow-up appointments since last contact? What was outcome of appointment? When is next follow-up scheduled? No    Review medications. Any medication changes since last outreach? Does patient have any questions or issues related to their medications? No changes since last med review        Verbalized understanding of dosage and administration    Home health active? If yes  any issue? Progress? n/a   Referrals needed?  (SW, Diabetes education, HH, etc. ) None    Other issues/Miscellaneous? (Transportation, access to meals, ability to perform ADLs, adequate caregiver support, etc.) Patient is A&O and independent with all ADLs. Good support system with family and spouse. Denies any med, transport, or psychosocial issues. Next Outreach Scheduled: Will f/u in 2 weeks and possibly graduate patient at this time. Next Steps/Goals:   Continue CHF action plan -Continuing daily weights   limiting her fluid intake   Limiting her salt intake. Verbalized understanding to call doctor if weight increases by 3 pounds in 24 hr period. Will follow up with CM in 2 weeks. Patient has my contact information and encouraged to phone me sooner if he has any questions or concerns.       Community Care Manager:   Radha Gamez RN, [facility]   c: [phone] / [email or web address]               This note will not be viewable in [address]

## 2017-10-27 ENCOUNTER — APPOINTMENT (OUTPATIENT)
Dept: CARDIAC REHAB | Age: 72
End: 2017-10-27

## 2017-10-30 ENCOUNTER — APPOINTMENT (OUTPATIENT)
Dept: CARDIAC REHAB | Age: 72
End: 2017-10-30

## 2017-11-01 ENCOUNTER — APPOINTMENT (OUTPATIENT)
Dept: CARDIAC REHAB | Age: 72
End: 2017-11-01

## 2017-11-03 ENCOUNTER — APPOINTMENT (OUTPATIENT)
Dept: CARDIAC REHAB | Age: 72
End: 2017-11-03

## 2017-11-06 ENCOUNTER — APPOINTMENT (OUTPATIENT)
Dept: CARDIAC REHAB | Age: 72
End: 2017-11-06

## 2017-11-08 ENCOUNTER — APPOINTMENT (OUTPATIENT)
Dept: CARDIAC REHAB | Age: 72
End: 2017-11-08

## 2017-11-09 ENCOUNTER — PATIENT OUTREACH (OUTPATIENT)
Dept: CASE MANAGEMENT | Age: 72
End: 2017-11-09

## 2017-11-09 NOTE — PROGRESS NOTES
Community Care Management  Follow up Outreach Note   Date/Time of Outreach: Phone call: 11/9/17 @12:00pm      Home visit:     Reason for follow-up:   f/u phone call    Disease specific complaints/issues: CHF bundle patient        Patient progress towards goals set from last contact:   Patient states the edema in her legs is still continuing to improve -  Continuing daily weights   States that she is still limiting her fluid intake   Limiting her salt intake. Verbalized understanding to call doctor if weight increases by 3 pounds in 24 hr period. Denies [facility]        Has patient attended any PCP or specialist follow-up appointments since last contact? What was outcome of appointment? When is next follow-up scheduled? Cardiology scheduled for 12/1/17     Review medications. Any medication changes since last outreach? Does patient have any questions or issues related to their medications? No changes since last review            Verbalized understanding of dosage and administration    Home health active? If yes  any issue? Progress? No    Referrals needed?  (SW, Diabetes education, HH, etc. ) None   Other issues/Miscellaneous? (Transportation, access to meals, ability to perform ADLs, adequate caregiver support, etc.) Patient is A&O  Independent with all ADLs  Good support system with family and spouse. Denies any meds, transport, or psychosocial issues    Next Outreach Scheduled: in 2 weeks       Next Steps/Goals:   A&O. She is still using her oxygen more during the day and uses it continuously at night. Uses her walker at times, but been having to use her wheelchair majority of the time in the home because of [facility]. Patient will be calling pulmonologist to set up bronchoscopy  Patient states she is still doing some housework. Independent with all ADLs. Will follow up with CM in 1 week.     Patient has my contact information and encouraged to phone me sooner if he has any questions or concerns. Community Care Manager:   Lidia Barker RN, [facility]    RN   [email or web address]         This note will not be viewable in [address]

## 2017-11-10 ENCOUNTER — APPOINTMENT (OUTPATIENT)
Dept: CARDIAC REHAB | Age: 72
End: 2017-11-10

## 2017-11-13 ENCOUNTER — APPOINTMENT (OUTPATIENT)
Dept: CARDIAC REHAB | Age: 72
End: 2017-11-13

## 2017-11-22 ENCOUNTER — PATIENT OUTREACH (OUTPATIENT)
Dept: CASE MANAGEMENT | Age: 72
End: 2017-11-22

## 2017-11-22 NOTE — PROGRESS NOTES
Community Care Management  Follow up Outreach Note   Date/Time of Outreach: Phone call: 11/22/17 @10:00am      Home visit:      Reason for follow-up: f/u phone call    Disease specific complaints/issues: CHF bundle patient       Patient progress towards goals set from last contact: Patient states the edema in her legs is still continuing to improve -  Continuing daily weights   States that she is still limiting her fluid intake   Limiting her salt intake.    Verbalized understanding to call doctor if weight increases by 3 pounds in 24 hr period.    Denies [facility]     Has patient attended any PCP or specialist follow-up appointments since last contact?     What was outcome of appointment?     When is next follow-up scheduled? Cardiology scheduled for 12/1/17   Review medications.     Any medication changes since last outreach?     Does patient have any questions or issues related to their medications? No changes since last review                 Verbalized understanding of dosage and administration    Home health active? If yes  any issue? Progress? No    Referrals needed?  (SW, Diabetes education, HH, etc. ) None   Other issues/Miscellaneous? (Transportation, access to meals, ability to perform ADLs, adequate caregiver support, etc.) Patient is A&O  Independent with all ADLs  Good support system with family and spouse. Denies any meds, transport, or psychosocial issues    Next Outreach Scheduled: in 2 weeks      Next Steps/Goals: Continue CHF action plan -Continuing daily weights   limiting her fluid intake   Limiting her salt intake.    Verbalized understanding to call doctor if weight increases by 3 pounds in 24 hr period.    Will follow up with CM in 2 weeks. Patient has my contact information and encouraged to phone me sooner if he has any questions or concerns. Community Care Manager:    Crista Marroquin RN, [facility]    RN   [email or web address]                 This note will not be viewable in [address]

## 2017-12-06 ENCOUNTER — PATIENT OUTREACH (OUTPATIENT)
Dept: CASE MANAGEMENT | Age: 72
End: 2017-12-06

## 2017-12-06 NOTE — PROGRESS NOTES
Community Care Management  Follow up Outreach Note   Date/Time of Outreach: Phone call: 12/6/17 @10:30am      Home visit:       Reason for follow-up: f/u phone call    Disease specific complaints/issues: CHF bundle patient        Patient progress towards goals set from last contact: Patient states the edema in her legs is still continuing to improve (referred to lymphaedema specialist by cardiologist)  Continuing daily weights   States that she is still limiting her fluid intake   Limiting her salt intake.    Verbalized understanding to call doctor if weight increases by 3 pounds in 24 hr period.    Denies [facility]     Has patient attended any PCP or specialist follow-up appointments since last contact?      What was outcome of appointment?      When is next follow-up scheduled? Cardiology apt on 12/1/17 good report and referred to a lymhpaedema specialist and apt is 12/13/17   Review medications.      Any medication changes since last outreach?      Does patient have any questions or issues related to their medications? No changes since last review                      Verbalized understanding of dosage and administration    Home health active? If yes Eleanor Corbettw issue? Progress? No    Referrals needed?  (SW, Diabetes education, HH, etc. ) None   Other issues/Miscellaneous? (Transportation, access to meals, ability to perform ADLs, adequate caregiver support, etc.) Patient is A&O  Independent with all ADLs  Good support system with family and spouse.    Denies any meds, transport, or psychosocial issues    Next Outreach Scheduled: in 2 weeks       Next Steps/Goals: Continue CHF action plan -Continuing daily weights   Attend [facility] specialist apt on 12/1317  limiting her fluid intake   Limiting her salt intake.    Verbalized understanding to call doctor if weight increases by 3 pounds in 24 hr period.    Will follow up with CM in 2 weeks.    Patient has my contact information and encouraged to phone me sooner if he has any questions or concerns. [address] Manager: [address] RN, [facility]    RN   [email or web address]             This note will not be viewable in MyChart.

## 2017-12-13 ENCOUNTER — HOSPITAL ENCOUNTER (OUTPATIENT)
Dept: PHYSICAL THERAPY | Age: 72
Discharge: HOME | End: 2017-12-13
Attending: INTERNAL MEDICINE
Payer: MEDICARE

## 2017-12-13 DIAGNOSIS — Z95.2 S/P TAVR (TRANSCATHETER AORTIC VALVE REPLACEMENT): Chronic | ICD-10-CM

## 2017-12-13 PROCEDURE — 97140 MANUAL THERAPY 1/> REGIONS: CPT

## 2017-12-13 PROCEDURE — G8991 OTHER PT/OT GOAL STATUS: HCPCS

## 2017-12-13 PROCEDURE — G8990 OTHER PT/OT CURRENT STATUS: HCPCS

## 2017-12-13 PROCEDURE — 97166 OT EVAL MOD COMPLEX 45 MIN: CPT

## 2017-12-13 NOTE — PROGRESS NOTES
Ambulatory/Rehab Services H2 Model Falls Risk Assessment    Risk Factor Pts. ·   Confusion/Disorientation/Impulsivity  []    4 ·   Symptomatic Depression  []   2 ·   Altered Elimination  []   1 ·   Dizziness/Vertigo  []   1 ·   Gender (Male)  []   1 ·   Any administered antiepileptics (anticonvulsants):  []   2 ·   Any administered benzodiazepines:  []   1 ·   Visual Impairment (specify):  []   1 ·   Portable Oxygen Use  []   1 ·   Orthostatic ? BP  []   1 ·   History of Recent Falls (within 3 mos.)  []   5     Ability to Rise from Chair (choose one) Pts. ·   Ability to rise in a single movement  []   0 ·   Pushes up, successful in one attempt  []   1 ·   Multiple attempts, but successful  [x]   3 ·   Unable to rise without assistance  []   4   Total: (5 or greater = High Risk) 3     Falls Prevention Plan:   []                Physical Limitations to Exercise (specify):   []                Mobility Assistance Device (type):   []                Exercise/Equipment Adaptation (specify):    ©2010 [facility] [place] Patent #[id number].  Federal Law prohibits the replication, distribution or use without written permission from [facility]

## 2017-12-13 NOTE — THERAPY EVALUATION
Alvaro Stroud  : 1945  Payor: CARE IMPROVEMENT PLUS / [place] / Product Type: Managed Care Medicare /  [address] [place] [address]  at [place] [address] Therapy  [address], [address]  [phone]   [phone]            OUTPATIENT OCCUPATIONAL THERAPY: Initial Assessment and Daily Note 2017    ICD-10: Treatment Diagnosis: I89.0, lymphedema not elsewhere specified   Precautions/Allergies:   Flexeril [cyclobenzaprine]   Fall Risk Score: 3 (? 5 = High Risk)  MD Orders: eval and treat MEDICAL/REFERRING DIAGNOSIS:   S/P TAVR (transcatheter aortic valve replacement) [Z95.2]   DATE OF ONSET: chronic swelling with worsening symptoms over the last few months    REFERRING PHYSICIAN: Mikayla JACKMAN,*  RETURN PHYSICIAN APPOINTMENT: to be determined      INITIAL ASSESSMENT:  Ms. Bon Jones was referred to OT for the evaluation and treatment of bilateral LE lymphedema multifactorial in nature. Pt stated she had a blood clot several years ago and since that time LE swelling has slowly increased. New DVT was ruled out. At some point over the years pt had lymphedema therapy but she is not sure where of when. She had velcro compression garments for home program at one point too but states that was many years ago and she has not worn any form of compression in a long time. Pt also states she sleeps every night in a chair with legs hanging in a dependent position which contributes to her swelling. She is a member of [employer] but has not been in awhile. When she was exercising there she did pool exercises and saw good benefit. She would like to get mobile enough to return to pool exercises. Ms. Bon Jones would benefit from skilled OT to decrease LE swelling before transitioning to long term compression garments and home program.    PLAN OF CARE:   PROBLEM LIST:  1. Increased Pain  2. Decreased Activity Tolerance  3.  Edema/Girth INTERVENTIONS PLANNED  1. Skin care  2. Compression bandaging  3. Fitting for compression garment(s)  4. Manual therapy/Manual lymph drainage  5. Therapeutic exercise/Therapeutic activities  6. Patient Education  7. Compression pump trial prn  8.  kinesiotaping    TREATMENT PLAN:  Effective Dates: 12-13-17 TO 3-7-17. Frequency/Duration: 2 times a week for 12 weeks  2 x 8 weeks and upon reassessment. Will adjust frequency and duration as progress indicates. GOALS: (Goals have been discussed and agreed upon with patient.)  Short-Term Functional Goals: Time Frame: 6 weeks  1. The patient/caregiver will verbalize understanding of lymphedema precautions. 2. Patient will be independent with skin care regimen to decrease risk of cellulitis. 3. Patient will be independent in lymphatic exercises. Discharge Goals: Time Frame: 12 weeks  1. Patient's bilateral lower extremity circumferential measurements will decrease on volumetric graph by 10-15cm to maximize functional use in ADL's and improve activity tolerance. 2. The patient/caregiver will be independent with home management of lymphedema. 3. Patient/caregiver will be independent donning and doffing bilateral lower extremity compression garment. Rehabilitation Potential For Stated Goals: Good  Regarding [facility] therapy, I certify that the treatment plan above will be carried out by a therapist or under their direction. Thank you for this referral,  Charlette Brown, OTR/L, CLT    Referring Physician Signature: Liberty Kelley,* _________________________  Date _________            The information in this section was collected on 12/13/2017 (except where otherwise noted). OCCUPATIONAL PROFILE & HISTORY:   History of Present Injury/Illness (Reason for Referral):  Ms. Tonie Altamirano was referred to OT for the evaluation and treatment of bilateral LE lymphedema multifactorial in nature.   Pt stated she had a blood clot several years ago and since that time LE swelling has slowly increased. New DVT was ruled out. At some point over the years pt had lymphedema therapy but she is not sure where of when. She had velcro compression garments for home program at one point too but states that was many years ago and she has not worn any form of compression in a long time. Pt also states she sleeps every night in a chair with legs hanging in a dependent position which contributes to her swelling. She is a member of [employer] but has not been in awhile. When she was exercising there she did pool exercises and saw good benefit. She would like to get mobile enough to return to pool exercises. Ms. Tam Sarabia would benefit from skilled OT to decrease LE swelling before transitioning to long term compression garments and home program.  Past Medical History/Comorbidities:   Ms. Tam Sarabia  has a past medical history of Amyloidosis ([address] Arthritis; Asthma; Autoimmune disease ([address] Chronic obstructive pulmonary disease ([address] Coronary artery disease involving native coronary artery of native heart without angina pectoris (6/6/2017); Dizziness; GERD (gastroesophageal reflux disease); Graves disease; H/O therapeutic radiation; Hiatal hernia; History of anticoagulant therapy; History of asthma; History of GI bleed (02/2015); History of pneumonia; History of TIA (transient ischemic attack); Hyperlipidemia; Hypertension; Hypothyroidism; Iron deficiency anemia; Morbid obesity ([address] Osteoarthritis; Pulmonary infiltrate; Seizures ([address] Severe aortic stenosis; SOB (shortness of breath); Thrombocytopenia ([address] Thromboembolus ([address] (2015); Tightness in chest; and Unspecified sleep apnea. Ms. Tam Sarabia  has a past surgical history that includes other surgical; colonoscopy; endoscopy; and wisdom teeth extraction. Transatheter aortic valve replacement   Social History/Living Environment:     pt lives in her own home with her spouse - she has elevating bed but does not use;  Sleeps in chair with legs in dependent position   Prior Level of Function/Work/Activity:  Retired:  Pt leads relatively sedentary lifestyle   Dominant Side:         RIGHT  Previous Treatment Approaches:          Previous therapy for lymphedema in the past put unsure where or when - it sounds like someone ordered pt Circaid garments some time ago but she has not worn in a very long time - she has also tried compression knee highs but could not tolerate   Current Medications:    Current Outpatient Prescriptions:     clopidogrel (PLAVIX) 75 mg tab, Take 1 Tab by mouth daily. , Disp: 30 Tab, Rfl: 6    aspirin 81 mg chewable tablet, Take 1 Tab by mouth daily. , Disp: , Rfl:     levETIRAcetam (KEPPRA) 750 mg tablet, Take 750 mg by mouth two (2) times a day., Disp: , Rfl:     DIMENHYDRINATE (WAL-DRAM PO), Take 25 mg by mouth as needed (dizziness). , Disp: , Rfl:     rosuvastatin (CRESTOR) 20 mg tablet, Take 20 mg by mouth nightly., Disp: , Rfl:     levothyroxine (SYNTHROID) 112 mcg tablet, Take  by mouth Daily (before breakfast). , Disp: , Rfl:     omeprazole (PRILOSEC) 40 mg capsule, Take 40 mg by mouth daily. , Disp: , Rfl:     verapamil (CALAN) 240 mg capsule, Take 240 mg by mouth daily. , Disp: , Rfl:     loratadine (CLARITIN) 10 mg tablet, Take 10 mg by mouth nightly., Disp: , Rfl:             Date Last Reviewed:  12/13/2017   Complexity Level : Expanded review of therapy/medical records (1-2):  MODERATE COMPLEXITY   ASSESSMENT OF OCCUPATIONAL PERFORMANCE:   Palpation:          Bilateral LE swelling from foot to knee with foot involvement/dorsal hump - moderate dense pitting fluid present in gaitor region - lobular swelling around ankles  ROM:          WFL  Strength:          Overall decreased   Skin Integrity:          Skin is intact  - mildly dry, pt uses coconut oil daily - pt instructed on daily, meticulous skin/nail care   Sensation:  Pt reports intact  Functional Mobility:  Straight cane with decreased activity tolerance   Activities of Daily Living :  Modified independence for self care - spouse assist with home management as needed  Edema/Girth:  pitting    Left Right    Initial Most Recent Initial Most Recent   Upper  Extremity           Lower  Extremity  191.0cm    192.0cm         Physical Skills Involved:  1. Strength  2. Pain (Chronic)  3. Edema  4. mobility Cognitive Skills Affected (resulting in the inability to perform in a timely and safe manner): Psychosocial Skills Affected:  1. Habits/Routines  2. Environmental Adaptation  3. Social Interaction   Number of elements that affect the Plan of Care: 3-5:  MODERATE COMPLEXITY   CLINICAL DECISION MAKING:   Outcome Measure: Tool Used: Tool Used: Lymphedema Life Impact Scale   Score:  Initial: 32 Most Recent: X (Date: -- )   Interpretation of Score: The Lymphedema Life Impact Scale (LLIS) is a validated instrument that measures the physical, functional, and psychosocial concerns pertinent to patients with extremity lymphedema. The Scale's questionnaire is administered to patients to gauge impairments, activity limitations, and participation restrictions resulting from their lymphedema. Score 0 1-13 14-26 27-40 41-54 55-67 68   Modifier CH CI CJ CK CL CM CN     ? Other PT/OT Primary Functional Limitations:     - CURRENT STATUS: CK - 40%-59% impaired, limited or restricted    - GOAL STATUS: CJ - 20%-39% impaired, limited or restricted    - D/C STATUS:  ---------------To be determined---------------       Medical Necessity:   · Patient is expected to demonstrate progress in LE swelling and pain to decrease risk of cellulitis and improve LE mobility/activity tolerance . Reason for Services/Other Comments:  · Patient continues to require skilled intervention due to uncontrolled swelling of the LE's putting her at risk for cellulitis and hindering LE mobility.   Clinical Decision-Making Assessment:  Pt.'s lifestyle choices are impacting her LE swelling such as sleeping in chair with legs in dependent position, stopping all exercise including pool exercises,etc.  Her spouse states he has been trying to get her back to the gym and encouraging her to sleep in bed or recliner with legs up. Pt is also somewhat reluctant at the idea of daily compression. Use of outcome tool(s) and clinical judgement create a POC that gives a: Questionable prediction of patient's progress: MODERATE COMPLEXITY   TREATMENT:   (In addition to Assessment/Re-Assessment sessions the following treatments were rendered)    Pre-treatment Symptoms/Complaints:  Bilateral LE swelling, LE pain, decreased LE mobility/activity tolerance   Pain: Initial:   Pain Intensity 1: 7  Post Session:  7   Occupational Therapy Treatments:    OT eval( x ) OT eval was completed. Pt received information on lymphedema and risk reduction/self management practices as outlined by the National Lymphedema Network. Treatment was initiated with MLD. Due to holidays and other appointments pt can not return to therapy for a week so  bandaging will be initiated at next treatment session. Therapeutic Exercise ( minutes):     HEP:  As above; handouts given to patient for all exercises.   Manual Therapy:          Manual Lymph Drainage:(30 minutes) modified MLD          Lymph Nodes:    Cervical Supraclavicular Axillary Abdominal Inguinal Popliteal Antecubital   RIGHT []     []     []     []     [x]     [x]     []       LEFT []     []     []     []     [x]     [x]     []          Anastamoses:   Axillo-axillary Inguino-inguinal Axillo-inguinal Inguino-axillary   ANTERIOR []     []     []     []       POSTERIOR []     []     []     []       RIGHT []     []     []     []       LEFT []     []     []     []         Limbs:   []    RUE     []    LUE     [x]    RLE    [x]    LLE   Compression: ( minutes):     Treatment/Session Assessment:    · Response to Treatment:  Pt tolerated treatment session well with no medical complications. Skin was intact but dry addressed with eucerin lotion. Due to holidays and other appointments it may be a week or so before pt can return to therapy. She will begin sleeping with her legs elevated. Her goal for therapy is to decrease the swelling and pain. She has good support system in spouse. · Compliance with Program/Exercises: Will assess as treatment progresses. · Recommendations/Intent for next treatment session: \"Next visit will focus on complete decongestive therapy\".   Total Treatment Duration:  OT Patient Time In/Time Out  Time In: 1200  Time Out: [address], OT

## 2017-12-20 ENCOUNTER — PATIENT OUTREACH (OUTPATIENT)
Dept: CASE MANAGEMENT | Age: 72
End: 2017-12-20

## 2017-12-20 NOTE — PROGRESS NOTES
[address] Management  Follow up Outreach Note   Date/Time of Outreach: Phone call: 12/20/17 @10:30am      Home visit:       Reason for follow-up: f/u phone call    Disease specific complaints/issues: CHF bundle patient        Patient progress towards goals set from last contact: Patient states the edema in her legs lymphaedema is being treated now by lymphaedema specialist - first visit was 12/13/17. (Referred to lymphaedema specialist by cardiologist)  Continuing daily weights   States that she is still limiting her fluid intake   Limiting her salt intake.    Verbalized understanding to call doctor if weight increases by 3 pounds in 24 hr period.    Denies [facility]    Encourage patient to elevate her lower extremities more often to help with edema   Has patient attended any PCP or specialist follow-up appointments since last contact?      What was outcome of appointment?      When is next follow-up scheduled? Patient states the edema in her legs lymphaedema is being treated now by lymphaedema specialist - first visit was 12/13/17. (Referred to lymphaedema specialist by cardiologist)  Next Cardiology apt is 5/29/18   Review medications.      Any medication changes since last outreach?      Does patient have any questions or issues related to their medications? No changes since last review                      Verbalized understanding of dosage and administration    Home health active? If yes Gentry Thrasher issue? Progress? No    Referrals needed?  (SW, Diabetes education, HH, etc. ) None   Other issues/Miscellaneous? (Transportation, access to meals, ability to perform ADLs, adequate caregiver support, etc.) Patient is A&O  Independent with all ADLs  Good support system with family and spouse.    Denies any meds, transport, or psychosocial issues    Next Outreach Scheduled: in 2 weeks       Next Steps/Goals: Continue CHF action plan -Continuing daily weights   Elevate lower extremities often to help with edema  limiting her fluid intake   Limiting her salt intake.    Verbalized understanding to call doctor if weight increases by 3 pounds in 24 hr period.    Will follow up with CM in 2 weeks.    Patient has my contact information and encouraged to phone me sooner if he has any questions or concerns. [address] Manager: Carlyn Perla RN, [facility]    RN   [email or web address]              This note will not be viewable in [employer].

## 2018-01-03 ENCOUNTER — PATIENT OUTREACH (OUTPATIENT)
Dept: CASE MANAGEMENT | Age: 73
End: 2018-01-03

## 2018-01-03 NOTE — PROGRESS NOTES
[facility] Management  Follow up Outreach Note   Date/Time of Outreach: Phone call: 1/3/18 @1:00pm      Home visit:       Reason for follow-up: f/u phone call    Disease specific complaints/issues: CHF bundle patient        Patient progress towards goals set from last contact: Patient states the edema in her legs lymphaedema is being treated now by lymphaedema specialist - first visit was 12/13/17. (Referred to lymphaedema specialist by cardiologist)  Continuing daily weights   States that she is still limiting her fluid intake   Limiting her salt intake.    Verbalized understanding to call doctor if weight increases by 3 pounds in 24 hr period.    Denies [facility]    Encourage patient to elevate her lower extremities more often to help with edema   Has patient attended any PCP or specialist follow-up appointments since last contact?      What was outcome of appointment?      When is next follow-up scheduled? Patient states the edema in her legs lymphaedema is being treated now by lymphaedema specialist - first visit was 12/13/17. (Referred to lymphaedema specialist by cardiologist)  Next Cardiology apt is 5/29/18   Review medications.      Any medication changes since last outreach?      Does patient have any questions or issues related to their medications? No changes since last review                      Verbalized understanding of dosage and administration    Home health active? If yes Kayleen Zaragoza issue? Progress? No    Referrals needed?  (SW, Diabetes education, HH, etc. ) None   Other issues/Miscellaneous? (Transportation, access to meals, ability to perform ADLs, adequate caregiver support, etc.) Patient is A&O  Independent with all ADLs  Good support system with family and spouse.    Denies any meds, transport, or psychosocial issues    Next Outreach Scheduled: in 1 month       Next Steps/Goals: Continue CHF action plan -Continuing daily weights   Elevate lower extremities often to help with edema  limiting her fluid intake   Limiting her salt intake.    Verbalized understanding to call doctor if weight increases by 3 pounds in 24 hr period.    Will follow up with CM in 1 month.    Patient has my contact information and encouraged to phone me sooner if he has any questions or concerns. [address] Manager: Alea Perla RN, [facility]    RN   [email or web address]             This note will not be viewable in [employer].

## 2018-01-31 ENCOUNTER — PATIENT OUTREACH (OUTPATIENT)
Dept: CASE MANAGEMENT | Age: 73
End: 2018-01-31

## 2018-01-31 NOTE — PROGRESS NOTES
[facility] Management  Follow up Outreach Note   Date/Time of Outreach: Phone call: 1/31/18 @1:00pm      Home visit:       Reason for follow-up: f/u phone call    Disease specific complaints/issues: CHF bundle patient        Patient progress towards goals set from last contact: Patient states the edema in her legs lymphaedema is being treated now by lymphaedema specialist - first visit was 12/13/17. (Referred to lymphaedema specialist by cardiologist)  Continuing daily weights   States that she is still limiting her fluid intake   Limiting her salt intake.    Verbalized understanding to call doctor if weight increases by 3 pounds in 24 hr period.    Denies [facility]    Encourage patient to elevate her lower extremities more often to help with edema   Has patient attended any PCP or specialist follow-up appointments since last contact?      What was outcome of appointment?      When is next follow-up scheduled? Patient states the edema in her legs lymphaedema is being treated now by lymphaedema specialist - first visit was 12/13/17. (Referred to lymphaedema specialist by cardiologist)  Next Cardiology apt is 5/29/18   Review medications.      Any medication changes since last outreach?      Does patient have any questions or issues related to their medications? No changes since last review                      Verbalized understanding of dosage and administration    Home health active? If yes Alease Pears issue? Progress? No    Referrals needed?  (SW, Diabetes education, HH, etc. ) None   Other issues/Miscellaneous? (Transportation, access to meals, ability to perform ADLs, adequate caregiver support, etc.) Patient is A&O  Independent with all ADLs  Good support system with family and spouse.    Denies any meds, transport, or psychosocial issues    Next Outreach Scheduled: Patient appears stable at this time  Goals met  Case closed and patient graduated at this time        Next Steps/Goals: Continue CHF action plan -Continuing daily weights   Elevate lower extremities often to help with edema  limiting her fluid intake   Limiting her salt intake.    Verbalized understanding to call doctor if weight increases by 3 pounds in 24 hr period.      Patient has my contact information and encouraged to phone me sooner if he has any questions or concerns. [address] Manager: Rosaura Perla RN, [facility]    RN   [email or web address]             This note will not be viewable in [employer].

## 2018-02-06 NOTE — THERAPY DISCHARGE
Trae Harding  : 1945  Payor: CARE IMPROVEMENT PLUS / [place]ARE [place] / Product Type: Managed Care Medicare /  [facility] [employer]  at [facility] Therapy  [address], [address]  [phone]   [phone]            OUTPATIENT OCCUPATIONAL THERAPY: Discontinuation Summary 2018    ICD-10: Treatment Diagnosis: I89.0, lymphedema not elsewhere specified   Precautions/Allergies:   Flexeril [cyclobenzaprine]   Fall Risk Score: 3 (? 5 = High Risk)  MD Orders: eval and treat MEDICAL/REFERRING DIAGNOSIS:   S/P TAVR (transcatheter aortic valve replacement) [Z95.2]   DATE OF ONSET: chronic swelling with worsening symptoms over the last few months    REFERRING PHYSICIAN: Rangel JACKMAN,*  RETURN PHYSICIAN APPOINTMENT: to be determined    Discontinuation Summary:  Pt is being discharged from OT as she was seen for initial evaluation on 2017 and did not return for any further therapy. At time of eval, pt and spouse were education on lymphedema and risk reduction/self management practices as outlined by the [employer]. They were also educated on lymphedema treatment and seemed to agree to treatment plan but did not return for any further therapy. Unplanned discharge from OT. INITIAL ASSESSMENT:  Ms. Silvia Pearson was referred to OT for the evaluation and treatment of bilateral LE lymphedema multifactorial in nature. Pt stated she had a blood clot several years ago and since that time LE swelling has slowly increased. New DVT was ruled out. At some point over the years pt had lymphedema therapy but she is not sure where of when. She had velcro compression garments for home program at one point too but states that was many years ago and she has not worn any form of compression in a long time. Pt also states she sleeps every night in a chair with legs hanging in a dependent position which contributes to her swelling.   She is a member of the [employer] but has not been in awhile. When she was exercising there she did pool exercises and saw good benefit. She would like to get mobile enough to return to pool exercises. Ms. Tessie Aguirre would benefit from skilled OT to decrease LE swelling before transitioning to long term compression garments and home program.    PLAN OF CARE:   PROBLEM LIST:  1. Increased Pain  2. Decreased Activity Tolerance  3. Edema/Girth INTERVENTIONS PLANNED  1. Skin care  2. Compression bandaging  3. Fitting for compression garment(s)  4. Manual therapy/Manual lymph drainage  5. Therapeutic exercise/Therapeutic activities  6. Patient Education  7. Compression pump trial prn  8.  kinesiotaping    TREATMENT PLAN:  Effective Dates: 12-13-17 TO 3-7-17. Frequency/Duration: 2 times a week for 12 weeks  2 x 8 weeks and upon reassessment. Will adjust frequency and duration as progress indicates. GOALS: (Goals have been discussed and agreed upon with patient.) UNABLE TO ASSESS DUE TO UNPLANNED DISCHARGE FROM OT  Short-Term Functional Goals: Time Frame: 6 weeks  1. The patient/caregiver will verbalize understanding of lymphedema precautions. 2. Patient will be independent with skin care regimen to decrease risk of cellulitis. 3. Patient will be independent in lymphatic exercises. Discharge Goals: Time Frame: 12 weeks  1. Patient's bilateral lower extremity circumferential measurements will decrease on volumetric graph by 10-15cm to maximize functional use in ADL's and improve activity tolerance. 2. The patient/caregiver will be independent with home management of lymphedema. 3. Patient/caregiver will be independent donning and doffing bilateral lower extremity compression garment. Rehabilitation Potential For Stated Goals: Good  Regarding Kaylen Cardoso therapy, I certify that the treatment plan above will be carried out by a therapist or under their direction.   Thank you for this referral,  Jesús Lima, OTR/L, CLT    Referring Physician Signature: Baldomero Sheth,* _________________________  Date _________            The information in this section was collected on 12/13/2017 (except where otherwise noted). OCCUPATIONAL PROFILE & HISTORY:   History of Present Injury/Illness (Reason for Referral):  Ms. Ruthie Barraza was referred to OT for the evaluation and treatment of bilateral LE lymphedema multifactorial in nature. Pt stated she had a blood clot several years ago and since that time LE swelling has slowly increased. New DVT was ruled out. At some point over the years pt had lymphedema therapy but she is not sure where of when. She had velcro compression garments for home program at one point too but states that was many years ago and she has not worn any form of compression in a long time. Pt also states she sleeps every night in a chair with legs hanging in a dependent position which contributes to her swelling. She is a member of [email or web address] but has not been in awhile. When she was exercising there she did pool exercises and saw good benefit. She would like to get mobile enough to return to pool exercises. Ms. Ruthie Barraza would benefit from skilled OT to decrease LE swelling before transitioning to long term compression garments and home program.  Past Medical History/Comorbidities:   Ms. Ruthie Barraza  has a past medical history of Amyloidosis ([address] Arthritis; Asthma; Autoimmune disease ([address] Chronic obstructive pulmonary disease ([address] Coronary artery disease involving native coronary artery of native heart without angina pectoris (6/6/2017); Dizziness; GERD (gastroesophageal reflux disease); Graves disease; H/O therapeutic radiation; Hiatal hernia; History of anticoagulant therapy; History of asthma; History of GI bleed (02/2015); History of pneumonia; History of TIA (transient ischemic attack); Hyperlipidemia; Hypertension; Hypothyroidism; Iron deficiency anemia;  Morbid obesity ([address] Osteoarthritis; Pulmonary infiltrate; Seizures ([address] Severe aortic stenosis; SOB (shortness of breath); Thrombocytopenia ([address]romboembol[address] ([facility] [address] (2015); Tightness in chest; and Unspecified sleep apnea. Ms. Myrna Mullins  has a past surgical history that includes hx other surgical; hx colonoscopy; hx endoscopy; and hx wisdom teeth extraction. Transatheter aortic valve replacement   Social History/Living Environment:     pt lives in her own home with her spouse - she has elevating bed but does not use;  Sleeps in chair with legs in dependent position   Prior Level of Function/Work/Activity:  Retired:  Pt leads relatively sedentary lifestyle   Dominant Side:         RIGHT  Previous Treatment Approaches:          Previous therapy for lymphedema in the past put unsure where or when - it sounds like someone ordered pt [employer] garments some time ago but she has not worn in a very long time - she has also tried compression knee highs but could not tolerate   Current Medications:    Current Outpatient Prescriptions:     clopidogrel (PLAVIX) 75 mg tab, Take 1 Tab by mouth daily. , Disp: 30 Tab, Rfl: 6    aspirin 81 mg chewable tablet, Take 1 Tab by mouth daily. , Disp: , Rfl:     levETIRAcetam (KEPPRA) 750 mg tablet, Take 750 mg by mouth two (2) times a day., Disp: , Rfl:     DIMENHYDRINATE (WAL-DRAM PO), Take 25 mg by mouth as needed (dizziness). , Disp: , Rfl:     rosuvastatin (CRESTOR) 20 mg tablet, Take 20 mg by mouth nightly., Disp: , Rfl:     levothyroxine (SYNTHROID) 112 mcg tablet, Take  by mouth Daily (before breakfast). , Disp: , Rfl:     omeprazole (PRILOSEC) 40 mg capsule, Take 40 mg by mouth daily. , Disp: , Rfl:     verapamil (CALAN) 240 mg capsule, Take 240 mg by mouth daily. , Disp: , Rfl:     loratadine (CLARITIN) 10 mg tablet, Take 10 mg by mouth nightly., Disp: , Rfl:             Date Last Reviewed:  12/13/2017   Complexity Level : Expanded review of therapy/medical records (1-2):  MODERATE COMPLEXITY   ASSESSMENT OF OCCUPATIONAL PERFORMANCE:   Palpation:          Bilateral LE swelling from foot to knee with foot involvement/dorsal hump - moderate dense pitting fluid present in gaitor region - lobular swelling around ankles  ROM:          WFL  Strength:          Overall decreased   Skin Integrity:          Skin is intact  - mildly dry, pt uses coconut oil daily - pt instructed on daily, meticulous skin/nail care   Sensation:  Pt reports intact  Functional Mobility:  Straight cane with decreased activity tolerance   Activities of Daily Living :  Modified independence for self care - spouse assist with home management as needed  Edema/Girth:  pitting    Left Right    Initial Most Recent Initial Most Recent   Upper  Extremity           Lower  Extremity  191.0cm    192.0cm         Physical Skills Involved:  1. Strength  2. Pain (Chronic)  3. Edema  4. mobility Cognitive Skills Affected (resulting in the inability to perform in a timely and safe manner): Psychosocial Skills Affected:  1. Habits/Routines  2. Environmental Adaptation  3. Social Interaction   Number of elements that affect the Plan of Care: 3-5:  MODERATE COMPLEXITY   CLINICAL DECISION MAKING:   Outcome Measure: Tool Used: Tool Used: Lymphedema Life Impact Scale   Score:  Initial: 32 Most Recent: X (Date: -- )unplanned discharge from OT   Interpretation of Score: The Lymphedema Life Impact Scale (LLIS) is a validated instrument that measures the physical, functional, and psychosocial concerns pertinent to patients with extremity lymphedema. The Scale's questionnaire is administered to patients to gauge impairments, activity limitations, and participation restrictions resulting from their lymphedema. Score 0 1-13 14-26 27-40 41-54 55-67 68   Modifier CH CI CJ CK CL CM CN     ?  Other PT/OT Primary Functional Limitations:     - CURRENT STATUS: CK - 40%-59% impaired, limited or restricted    - GOAL STATUS: CJ - 20%-39% impaired, limited or restricted    - D/C STATUS:  ---------------To be determined--------------- unplanned discharge from OT       Clinical Decision-Making Assessment:  Pt.'s lifestyle choices are impacting her LE swelling such as sleeping in chair with legs in dependent position, stopping all exercise including pool exercises,etc.  Her spouse states he has been trying to get her back to the gym and encouraging her to sleep in bed or recliner with legs up. Pt is also somewhat reluctant at the idea of daily compression.      Use of outcome tool(s) and clinical judgement create a POC that gives a: Questionable prediction of patient's progress: MODERATE COMPLEXITY   TREATMENT:     Recommendations/Intent for next treatment session: unplanned discharge from [facility],

## 2019-08-09 ENCOUNTER — HOSPITAL ENCOUNTER (OUTPATIENT)
Dept: LAB | Age: 74
Discharge: HOME | End: 2019-08-09
Attending: INTERNAL MEDICINE
Payer: MEDICARE

## 2019-08-09 DIAGNOSIS — I10 ESSENTIAL HYPERTENSION: ICD-10-CM

## 2019-08-09 LAB
ANION GAP SERPL CALC-SCNC: 7 MMOL/L (ref 7–16)
BUN SERPL-MCNC: 24 MG/DL (ref 8–23)
CALCIUM SERPL-MCNC: 8.9 MG/DL (ref 8.3–10.4)
CHLORIDE SERPL-SCNC: 112 MMOL/L (ref 98–107)
CO2 SERPL-SCNC: 25 MMOL/L (ref 21–32)
CREAT SERPL-MCNC: 1 MG/DL (ref 0.6–1)
GLUCOSE SERPL-MCNC: 91 MG/DL (ref 65–100)
POTASSIUM SERPL-SCNC: 4 MMOL/L (ref 3.5–5.1)
SODIUM SERPL-SCNC: 144 MMOL/L (ref 136–145)

## 2019-08-09 PROCEDURE — 36415 COLL VENOUS BLD VENIPUNCTURE: CPT

## 2019-08-09 PROCEDURE — 80048 BASIC METABOLIC PNL TOTAL CA: CPT

## 2019-10-31 ENCOUNTER — HOSPITAL ENCOUNTER (OUTPATIENT)
Dept: PHYSICAL THERAPY | Age: 74
Discharge: HOME | End: 2019-10-31
Payer: MEDICARE

## 2019-10-31 DIAGNOSIS — I89.0 LYMPHEDEMA: ICD-10-CM

## 2019-10-31 PROCEDURE — 97166 OT EVAL MOD COMPLEX 45 MIN: CPT

## 2019-10-31 PROCEDURE — 97110 THERAPEUTIC EXERCISES: CPT

## 2019-10-31 NOTE — THERAPY EVALUATION
Ulisses Trinidad  : 1945  Primary: [place] [employer] Ppo  Secondary:  [address] at [facility] Therapy  [address], [facility], [address]  [phone]   [phone]           OUTPATIENT OCCUPATIONAL THERAPY: Initial Assessment and Daily Note 10/31/2019    ICD-10: Treatment Diagnosis: I89.0 lymphedema not elsewhere classified,I89.022 lymphedema with obesity, M79.609 pain in limbs  Precautions/Allergies:   Flexeril [cyclobenzaprine]   Fall Risk Score:    Ambulatory/Rehab Services H2 Model Falls Risk Assessment    Risk Factors:       No Risk Factors Identified Ability to Rise from Chair:       (3)  Multiple attempts, but successful    Falls Prevention Plan: Mobility Assistance Device (specify):  cane   Total: (5 or greater = High Risk): 3     [facility] [employer] Patent #[id number]. Federal Law prohibits the replication, distribution or use without written permission from [facility] [employer]     MD Orders: eval and treat MEDICAL/REFERRING DIAGNOSIS:   Lymphedema [I89.0]   DATE OF ONSET: chronic   REFERRING PHYSICIAN: Telma Tejada,*  RETURN PHYSICIAN APPOINTMENT: to be determined      INITIAL ASSESSMENT:  Ms. Erick Townsend was referred to OT for the evaluation and treatment of bilateral LE lymphedema multifactorial in nature: past DVT, morbid obesity, arthritis/pain in knees leading to immobility. Swelling has been present for many years. Over the years patient has owned velcro compression wraps and compression knee highs but did not wear either for long. She has not worn compression now for many years. Her lifestyle only complicates her lymphedema. She is morbidly obese and very sedentary due to pain in her knees(not a surgical candidate) and spends most of the day with LE's in dependent position, even sleeping sitting up in a chair.  She presents with 2+lymphedema with pitting/fibrotic tissue from foot to knee with disproportionally large thighs with lobular swelling. She has been seen at this facility in the past for an eval only. She did not return for therapy. She states now she is ready to comply with therapy and see improvements in condition. She would benefit from skilled OT for CDT to reduce LE lymphedema, educated pt on self management and explore compression options. PLAN OF CARE:   PROBLEM LIST:  1. Increased Pain  2. Decreased Activity Tolerance  3. Edema/Girth  4. Decreased Skin Integrity/Hygeine INTERVENTIONS PLANNED  1. Skin care  2. Compression bandaging  3. Fitting for compression garment(s)  4. Manual therapy/Manual lymph drainage  5. Therapeutic exercise/Therapeutic activities  6. Patient Education  7. Compression pump trial prn  8.  kinesiotaping    TREATMENT PLAN:  Effective Dates: 10/31/19 to 1/29/2020 Frequency/Duration: 2x/week up to 90 days  2 xweek x90 days  and upon reassessment. Will adjust frequency and duration as progress indicates. GOALS: (Goals have been discussed and agreed upon with patient.)  Short-Term Functional Goals: Time Frame: 45 days  1. The patient/caregiver will verbalize understanding of lymphedema precautions. 2. Patient will be independent with skin care regimen to decrease risk of cellulitis. 3. The patient/caregiver will be independent at donning and doffing left bilateral extremity compression bandages. 4. The patient/caregiver will be independent with self-manual lymph drainage techniques and show decrease in limb volume. 5. Patient will be independent in lymphatic exercises. Discharge Goals: Time Frame: 90 days  1. Patient's bilateral lower extremity circumferential measurements will decrease on volumetric graph by 10-15cm to maximize functional use in ADL's.    2. The patient/caregiver will be independent with home management of lymphedema.     3. Patient/caregiver will be independent donning and doffing bilateral lower extremity compression mario. Rehabilitation Potential For Stated Goals: Guarded due to past history of noncompliance   Regarding [facility] ([facility]) therapy, I certify that the treatment plan above will be carried out by a therapist or under their direction. Thank you for this referral,  Sasha Matthews, OT     Referring Physician Signature: Tyrell Ojeda,* _________________________  Date _________            The information in this section was collected on 10/31/2019   (except where otherwise noted). OCCUPATIONAL PROFILE & HISTORY:   History of Present Injury/Illness (Reason for Referral):  Ms. Stefano Tam was referred to OT for the evaluation and treatment of bilateral LE lymphedema multifactorial in nature: past DVT, morbid obesity, arthritis/pain in knees leading to immobility. Swelling has been present for many years. Over the years patient has owned velcro compression wraps and compression knee highs but did not wear either for long. She has not worn compression now for many years. Her lifestyle only complicates her lymphedema. She is morbidly obese and very sedentary due to pain in her knees(not a surgical candidate) and spends most of the day with LE's in dependent position, even sleeping sitting up in a chair. She presents with 2+lymphedema with pitting/fibrotic tissue from foot to knee with disproportionally large thighs with lobular swelling. She has been seen at this facility in the past for an eval only. She did not return for therapy. She states now she is ready to comply with therapy and see improvements in condition. She would benefit from skilled OT for CDT to reduce LE lymphedema, educated pt on self management and explore compression options.    Past Medical History/Comorbidities:   Ms. Stefano Tam  has a past medical history of Amyloidosis ([address] Arthritis, Asthma, Autoimmune disease ([address] Chronic obstructive pulmonary disease ([address] Coronary artery disease involving native coronary artery of native heart without angina pectoris (6/6/2017), Dizziness, GERD (gastroesophageal reflux disease), Graves disease, H/O therapeutic radiation, Hiatal hernia, History of anticoagulant therapy, History of asthma, History of GI bleed (02/2015), History of pneumonia, History of TIA (transient ischemic attack), Hyperlipidemia, Hypertension, Hypothyroidism, Iron deficiency anemia, Morbid obesity ([address] Osteoarthritis, Pulmonary infiltrate, Seizures ([address] Severe aortic stenosis, SOB (shortness of breath), Thrombocytopenia ([address] Thromboembolus ([address] (2015), Tightness in chest, and Unspecified sleep apnea. Ms. Luis Hook  has a past surgical history that includes hx other surgical; hx colonoscopy; hx endoscopy; and hx wisdom teeth extraction. Social History/Living Environment:     pt lives in tri level home with spouse   Prior Level of Function/Work/Activity:  Retired - sedentary lifestyle, no regular exercise, does not elevate legs   Dominant Side:         RIGHT  Previous Treatment Approaches:          Pt came to therapy one time for eval but did not follow through with treatment  - she owned compression velcro wraps and knee highs but did not wear - not compliant with compression in the past   Current Medications:    Current Outpatient Medications:     calcium carbonate (CALCIUM 600 PO), Take 1 Tab by mouth daily. , Disp: , Rfl:     diclofenac (VOLTAREN) 1 % gel, Apply  to affected area four (4) times daily. , Disp: , Rfl:     glucosam-chondro-herb 149-hyal (GLUCOS CHOND CPLX ADVANCED PO), Take 2 Tabs by mouth two (2) times a day., Disp: , Rfl:     furosemide (LASIX) 20 mg tablet, Take 1 Tab by mouth daily. , Disp: 30 Tab, Rfl: 5    potassium chloride (KLOR-CON) 10 mEq tablet, Take 1 Tab by mouth daily. , Disp: 30 Tab, Rfl: 5    clopidogrel (PLAVIX) 75 mg tab, Take 1 Tab by mouth daily. , Disp: 30 Tab, Rfl: 6    aspirin 81 mg chewable tablet, Take 1 Tab by mouth daily. , Disp: , Rfl:     levETIRAcetam (KEPPRA) 750 mg tablet, Take 750 mg by mouth two (2) times a day., Disp: , Rfl:     rosuvastatin (CRESTOR) 20 mg tablet, Take 20 mg by mouth nightly., Disp: , Rfl:     levothyroxine (SYNTHROID) 112 mcg tablet, Take  by mouth Daily (before breakfast). , Disp: , Rfl:     omeprazole (PRILOSEC) 40 mg capsule, Take 40 mg by mouth daily. , Disp: , Rfl:     verapamil (CALAN) 240 mg capsule, Take 240 mg by mouth daily. , Disp: , Rfl:     loratadine (CLARITIN) 10 mg tablet, Take 10 mg by mouth nightly., Disp: , Rfl:             Date Last Reviewed:  11/1/2019   Complexity Level : Expanded review of therapy/medical records (1-2):  MODERATE COMPLEXITY   ASSESSMENT OF OCCUPATIONAL PERFORMANCE:   Palpation:          Bilateral LE lymphedema with swelling from foot to inguinal crease: pitting edema distal end of LE's/ankles/feet - large dorsal humps with toe involvement - large lobules around knees /medial thighs - thighs are disproportionally large in comparison with lower legs   Strength:          Generalized decreased strength   Special Tests:          Stemmer sign positive  Skin Integrity:          Pt uses coconut oil daily - skin is intact but with changes ie hyperpigmentation, thickening   Sensation:  Intact per pt   Functional Mobility:  Pt ambulates laboriously with straight cane and decreased activity tolerance   Activities of Daily Living : spouse assists pt as needed   Edema/Girth:  2+ and pitting   PRETREATMENT AFFECTED LIMB(s): lower extremity      Date:  10-31-19         Right / Left           Groin   []      []           8 inches   []      []           4 inches   []      []         PoplitealSpace   [x]      [x] 59.5/62          8 inches   [x]      [x] 41/43          4 inches   [x]      [x] 31/33          Ankle   [x]      [x] 32/34          Instep   [x]      [x] 25/25        Measurements are taken in centimeters:  2.54 cm = 1 inch  Total:right 188.5cm        left 197.5cm              Physical Skills Involved:  1. Strength  2. Activity Tolerance  3.  Pain (Chronic)  4. Edema  5. Skin Integrity Cognitive Skills Affected (resulting in the inability to perform in a timely and safe manner):  1. Psychosocial Skills Affected:  1. Habits/Routines  2. Social Interaction   Number of elements that affect the Plan of Care: 3-5:  MODERATE COMPLEXITY   CLINICAL DECISION MAKING:   Outcome Measure: Tool Used: Tool Used: Lymphedema Life Impact Scale   Score:  Initial: 37 Most Recent: X (Date: -- )   Interpretation of Score: The Lymphedema Life Impact Scale (LLIS) is a validated instrument that measures the physical, functional, and psychosocial concerns pertinent to patients with extremity lymphedema. The Scale's questionnaire is administered to patients to gauge impairments, activity limitations, and participation restrictions resulting from their lymphedema. Score 0 1-13 14-26 27-40 41-54 55-67 68   Modifier CH CI CJ CK CL CM CN     ? Other PT/OT Primary Functional Limitations:     - CURRENT STATUS: CK - 40%-59% impaired, limited or restricted    - GOAL STATUS: CJ - 20%-39% impaired, limited or restricted    - D/C STATUS:  ---------------To be determined---------------       Medical Necessity:   · Skilled intervention continues to be required due to uncontrolled swelling increasing risk of cellulitis and hindering ADL's. Reason for Services/Other Comments:  · Patient continues to require skilled intervention due to inability to self manage lymphedema at this time. Clinical Decision-Making Assessment:  Pt has past history of non compliance with compression/ therapy.   Swelling is complicated by morbid obesity, arthritis/pain in knees    Use of outcome tool(s) and clinical judgement create a POC that gives a: Questionable prediction of patient's progress: MODERATE COMPLEXITY   TREATMENT:   (In addition to Assessment/Re-Assessment sessions the following treatments were rendered)    Pre-treatment Symptoms/Complaints:  Severe, uncontrolled swelling of the LE's - morbid obesity - arthritis/pain in knees  Pain: Initial:   Pain Intensity 1: 9  Post Session:  9   Occupational Therapy Treatments:    OT eval( x ) OT eval was completed 10-31-19. Pt received information on lymphedema and risk reduction/self management practices as outlined by the National Lymphedema Network. Therapeutic Exercise ( minutes):    HEP:  As above; handouts given to patient for all exercises. Manual Therapy:(30min): Pt and spouse were educated on lymph pathology, CDT including the importance of daily compression, skin integrity management, and precautions          Manual Lymph Drainage:          Lymph Nodes:    Cervical Supraclavicular Axillary Abdominal Inguinal Popliteal Antecubital   RIGHT []     []     []     []     []     []     []       LEFT []     []     []     []     []     []     []          Anastamoses:   Axillo-axillary Inguino-inguinal Axillo-inguinal Inguino-axillary   ANTERIOR []     []     []     []       POSTERIOR []     []     []     []       RIGHT []     []     []     []       LEFT []     []     []     []         Limbs:   []    RUE     []    LUE     []    RLE    []    LLE   Compression: (30 minutes): After initial skin care treatment, a multi layered compression bandage was applied from foot to knee bilaterally over Lymphesoft foam. She was instructed to remove bandages if any medical complications. Pt was given ordering information for compression leggings from [facility] Treatment/Session Assessment:    · Response to Treatment:  Pt has complicated lymphedema with very large lobular thighs and fibrotic/pitting calves and feet. Swelling is further complicated by immobility from pain/arthiritis in knees(she is not a surgical candidate) and morbid obesity. In order for treatment to be successful compliance and support will be very important. She agrees with POC and has support from spouse. · Compliance with Program/Exercises:  Will assess as treatment progresses. · Recommendations/Intent for next treatment session: \"Next visit will focus on complete decongestive therapy - reduction phase\".   Total Treatment Duration:60min  OT Patient Time In/Time Out  Time In: 0200  Time Out: [id number] [facility],

## 2019-11-05 ENCOUNTER — HOSPITAL ENCOUNTER (OUTPATIENT)
Dept: PHYSICAL THERAPY | Age: 74
Discharge: HOME | End: 2019-11-05
Payer: MEDICARE

## 2019-11-05 PROCEDURE — 97140 MANUAL THERAPY 1/> REGIONS: CPT

## 2019-11-05 NOTE — PROGRESS NOTES
Rona Laird  : 1945  Primary: [employer]  Secondary:  [address] at [facility]  [address], [address]  [phone]   [phone]           OUTPATIENT OCCUPATIONAL THERAPY: Daily Note 2019    ICD-10: Treatment Diagnosis: I89.0 lymphedema not elsewhere classified,I89.022 lymphedema with obesity, M79.609 pain in limbs  Precautions/Allergies:   Flexeril [cyclobenzaprine]   Fall Risk Score:    Ambulatory/Rehab Services H2 Model Falls Risk Assessment    Risk Factors:       No Risk Factors Identified Ability to Rise from Chair:       (3)  Multiple attempts, but successful    Falls Prevention Plan: Mobility Assistance Device (specify):  cane   Total: (5 or greater = High Risk): 3     [facility] [employer] Patent #[id number]. Federal Law prohibits the replication, distribution or use without written permission from [facility] of Patient Communicator     MD Orders: eval and treat MEDICAL/REFERRING DIAGNOSIS:   Lymphedema, not elsewhere classified [I89.0]   DATE OF ONSET: chronic   REFERRING PHYSICIAN: Telma Tejada,*  RETURN PHYSICIAN APPOINTMENT: to be determined      INITIAL ASSESSMENT:  Ms. Cole Oseguera was referred to OT for the evaluation and treatment of bilateral LE lymphedema multifactorial in nature: past DVT, morbid obesity, arthritis/pain in knees leading to immobility. Swelling has been present for many years. Over the years patient has owned velcro compression wraps and compression knee highs but did not wear either for long. She has not worn compression now for many years. Her lifestyle only complicates her lymphedema. She is morbidly obese and very sedentary due to pain in her knees(not a surgical candidate) and spends most of the day with LE's in dependent position, even sleeping sitting up in a chair.  She presents with 2+lymphedema with pitting/fibrotic tissue from foot to knee with disproportionally large thighs with lobular swelling. She has been seen at this facility in the past for an eval only. She did not return for therapy. She states now she is ready to comply with therapy and see improvements in condition. She would benefit from skilled OT for CDT to reduce LE lymphedema, educated pt on self management and explore compression options. PLAN OF CARE:   PROBLEM LIST:  1. Increased Pain  2. Decreased Activity Tolerance  3. Edema/Girth  4. Decreased Skin Integrity/Hygeine INTERVENTIONS PLANNED  1. Skin care  2. Compression bandaging  3. Fitting for compression garment(s)  4. Manual therapy/Manual lymph drainage  5. Therapeutic exercise/Therapeutic activities  6. Patient Education  7. Compression pump trial prn  8.  kinesiotaping    TREATMENT PLAN:  Effective Dates: 10/31/19 to 1/29/2020 Frequency/Duration: 2x/week up to 90 days  2 xweek x90 days  and upon reassessment. Will adjust frequency and duration as progress indicates. GOALS: (Goals have been discussed and agreed upon with patient.)  Short-Term Functional Goals: Time Frame: 45 days  1. The patient/caregiver will verbalize understanding of lymphedema precautions. 2. Patient will be independent with skin care regimen to decrease risk of cellulitis. 3. The patient/caregiver will be independent at donning and doffing left bilateral extremity compression bandages. 4. The patient/caregiver will be independent with self-manual lymph drainage techniques and show decrease in limb volume. 5. Patient will be independent in lymphatic exercises. Discharge Goals: Time Frame: 90 days  1. Patient's bilateral lower extremity circumferential measurements will decrease on volumetric graph by 10-15cm to maximize functional use in ADL's.    2. The patient/caregiver will be independent with home management of lymphedema.     3. Patient/caregiver will be independent donning and doffing bilateral lower extremity compression mario. Rehabilitation Potential For Stated Goals: Guarded due to past history of noncompliance   Regarding [facility] ([facility]) therapy, I certify that the treatment plan above will be carried out by a therapist or under their direction. Thank you for this referral,  Josiane Gallegos OT                 The information in this section was collected on 11/5/2019   (except where otherwise noted). OCCUPATIONAL PROFILE & HISTORY:   History of Present Injury/Illness (Reason for Referral):  Ms. Ulises Albrecht was referred to OT for the evaluation and treatment of bilateral LE lymphedema multifactorial in nature: past DVT, morbid obesity, arthritis/pain in knees leading to immobility. Swelling has been present for many years. Over the years patient has owned velcro compression wraps and compression knee highs but did not wear either for long. She has not worn compression now for many years. Her lifestyle only complicates her lymphedema. She is morbidly obese and very sedentary due to pain in her knees(not a surgical candidate) and spends most of the day with LE's in dependent position, even sleeping sitting up in a chair. She presents with 2+lymphedema with pitting/fibrotic tissue from foot to knee with disproportionally large thighs with lobular swelling. She has been seen at this facility in the past for an eval only. She did not return for therapy. She states now she is ready to comply with therapy and see improvements in condition. She would benefit from skilled OT for CDT to reduce LE lymphedema, educated pt on self management and explore compression options.    Past Medical History/Comorbidities:   Ms. Ulises Albrecht  has a past medical history of Amyloidosis ([address] Arthritis, Asthma, Autoimmune disease ([address] Chronic obstructive pulmonary disease ([address] Coronary artery disease involving native coronary artery of native heart without angina pectoris (6/6/2017), Dizziness, GERD (gastroesophageal reflux disease), Graves disease, H/O therapeutic radiation, Hiatal hernia, History of anticoagulant therapy, History of asthma, History of GI bleed (02/2015), History of pneumonia, History of TIA (transient ischemic attack), Hyperlipidemia, Hypertension, Hypothyroidism, Iron deficiency anemia, Morbid obesity ([address] Osteoarthritis, Pulmonary infiltrate, Seizures ([address] Severe aortic stenosis, SOB (shortness of breath), Thrombocytopenia ([address] Thromboembolus ([address] (2015), Tightness in chest, and Unspecified sleep apnea. Ms. Stephanie Velazco  has a past surgical history that includes hx other surgical; hx colonoscopy; hx endoscopy; and hx wisdom teeth extraction. Social History/Living Environment:     pt lives in tri level home with spouse   Prior Level of Function/Work/Activity:  Retired - sedentary lifestyle, no regular exercise, does not elevate legs   Dominant Side:         RIGHT  Previous Treatment Approaches:          Pt came to therapy one time for eval but did not follow through with treatment  - she owned compression velcro wraps and knee highs but did not wear - not compliant with compression in the past   Current Medications:    Current Outpatient Medications:     calcium carbonate (CALCIUM 600 PO), Take 1 Tab by mouth daily. , Disp: , Rfl:     diclofenac (VOLTAREN) 1 % gel, Apply  to affected area four (4) times daily. , Disp: , Rfl:     glucosam-chondro-herb 149-hyal (GLUCOS CHOND CPLX ADVANCED PO), Take 2 Tabs by mouth two (2) times a day., Disp: , Rfl:     furosemide (LASIX) 20 mg tablet, Take 1 Tab by mouth daily. , Disp: 30 Tab, Rfl: 5    potassium chloride (KLOR-CON) 10 mEq tablet, Take 1 Tab by mouth daily. , Disp: 30 Tab, Rfl: 5    clopidogrel (PLAVIX) 75 mg tab, Take 1 Tab by mouth daily. , Disp: 30 Tab, Rfl: 6    aspirin 81 mg chewable tablet, Take 1 Tab by mouth daily. , Disp: , Rfl:     levETIRAcetam (KEPPRA) 750 mg tablet, Take 750 mg by mouth two (2) times a day., Disp: , Rfl:     rosuvastatin (CRESTOR) 20 mg tablet, Take 20 mg by mouth nightly., Disp: , Rfl:     levothyroxine (SYNTHROID) 112 mcg tablet, Take  by mouth Daily (before breakfast). , Disp: , Rfl:     omeprazole (PRILOSEC) 40 mg capsule, Take 40 mg by mouth daily. , Disp: , Rfl:     verapamil (CALAN) 240 mg capsule, Take 240 mg by mouth daily. , Disp: , Rfl:     loratadine (CLARITIN) 10 mg tablet, Take 10 mg by mouth nightly., Disp: , Rfl:             Date Last Reviewed:  11/5/2019   Complexity Level : Expanded review of therapy/medical records (1-2):  MODERATE COMPLEXITY   ASSESSMENT OF OCCUPATIONAL PERFORMANCE:   Palpation:          Bilateral LE lymphedema with swelling from foot to inguinal crease: pitting edema distal end of LE's/ankles/feet - large dorsal humps with toe involvement - large lobules around knees /medial thighs - thighs are disproportionally large in comparison with lower legs   Strength:          Generalized decreased strength   Special Tests:          Stemmer sign positive  Skin Integrity:          Pt uses coconut oil daily - skin is intact but with changes ie hyperpigmentation, thickening   Sensation:  Intact per pt   Functional Mobility:  Pt ambulates laboriously with straight cane and decreased activity tolerance   Activities of Daily Living : spouse assists pt as needed   Edema/Girth:  2+ and pitting   PRETREATMENT AFFECTED LIMB(s): lower extremity      Date:  10-31-19         Right / Left           Groin   []      []           8 inches   []      []           4 inches   []      []         PoplitealSpace   [x]      [x] 59.5/62          8 inches   [x]      [x] 41/43          4 inches   [x]      [x] 31/33          Ankle   [x]      [x] 32/34          Instep   [x]      [x] 25/25        Measurements are taken in centimeters:  2.54 cm = 1 inch  Total:right 188.5cm        left 197.5cm              Physical Skills Involved:  1. Strength  2. Activity Tolerance  3. Pain (Chronic)  4. Edema  5.  Skin Integrity Cognitive Skills Affected (resulting in the inability to perform in a timely and safe manner):  1. Psychosocial Skills Affected:  1. Habits/Routines  2. Social Interaction   Number of elements that affect the Plan of Care: 3-5:  MODERATE COMPLEXITY   CLINICAL DECISION MAKING:   Outcome Measure: Tool Used: Tool Used: Lymphedema Life Impact Scale   Score:  Initial: 37 Most Recent: X (Date: -- )   Interpretation of Score: The Lymphedema Life Impact Scale (LLIS) is a validated instrument that measures the physical, functional, and psychosocial concerns pertinent to patients with extremity lymphedema. The Scale's questionnaire is administered to patients to gauge impairments, activity limitations, and participation restrictions resulting from their lymphedema. Score 0 1-13 14-26 27-40 41-54 55-67 68   Modifier CH CI CJ CK CL CM CN     ? Other PT/OT Primary Functional Limitations:     - CURRENT STATUS: CK - 40%-59% impaired, limited or restricted    - GOAL STATUS: CJ - 20%-39% impaired, limited or restricted    - D/C STATUS:  ---------------To be determined---------------       Medical Necessity:   · Skilled intervention continues to be required due to uncontrolled swelling increasing risk of cellulitis and hindering ADL's. Reason for Services/Other Comments:  · Patient continues to require skilled intervention due to inability to self manage lymphedema at this time. Clinical Decision-Making Assessment:  Pt has past history of non compliance with compression/ therapy. Swelling is complicated by morbid obesity, arthritis/pain in knees    Use of outcome tool(s) and clinical judgement create a POC that gives a: Questionable prediction of patient's progress: MODERATE COMPLEXITY   TREATMENT:   (In addition to Assessment/Re-Assessment sessions the following treatments were rendered)    Pre-treatment Symptoms/Complaints:  Severe, uncontrolled swelling of the LE's - morbid obesity - arthritis/pain in knees.   Patient tolerated bandages for a few days and then had to remove them due numbness in feet. \"the bandages worked - they pulled fluid off of my legs. \"  Pain: Initial:   Pain Intensity 1: 9  Post Session:  9   Occupational Therapy Treatments:    OT eval( x ) OT eval was completed 10-31-19. Pt received information on lymphedema and risk reduction/self management practices as outlined by the National Lymphedema Network. Therapeutic Exercise ( minutes):    HEP:  As above; handouts given to patient for all exercises. Manual Therapy:(60min): Pt received skin care, MLD and multi layer bandaging to BLE'. Encouraged to perform ankle pumps and walk with bandages on to aid in promoting lymphatic flow. Manual Lymph Drainage:          Lymph Nodes:    Cervical Supraclavicular Axillary Abdominal Inguinal Popliteal Antecubital   RIGHT []     [x]     []     []     [x]     [x]     [x]       LEFT []     [x]     []     []     [x]     [x]     [x]          Anastamoses:   Axillo-axillary Inguino-inguinal Axillo-inguinal Inguino-axillary   ANTERIOR []     []     []     []       POSTERIOR []     []     []     []       RIGHT []     []     []     []       LEFT []     []     []     []         Limbs:   []    RUE     []    LUE     [x]    RLE    [x]    LLE   Compression: (30 minutes): After initial skin care treatment, a multi layered compression bandage was applied from foot to knee bilaterally over Lymphesoft foam. She was instructed to remove bandages if any medical complications.  instructed on bandaging techniques and will change bandages to allow patient to bathe. Pt was given ordering information for compression leggings from [facility] [employer] -- she is considering. .    Treatment/Session Assessment:    · Response to Treatment:  Pt has complicated lymphedema with very large lobular thighs and fibrotic/pitting calves and feet. Swelling is further complicated by immobility from pain/arthiritis in knees(she is not a surgical candidate) and morbid obesity.  In order for treatment to be successful compliance and support will be very important. She agrees with POC and has support from spouse. · Compliance with Program/Exercises: Will assess as treatment progresses. · Recommendations/Intent for next treatment session: \"Next visit will focus on complete decongestive therapy - reduction phase\".   Total Treatment Duration:60min  OT Patient Time In/Time Out  Time In: 0300  Time Out: [address], OT

## 2019-11-12 ENCOUNTER — HOSPITAL ENCOUNTER (OUTPATIENT)
Dept: PHYSICAL THERAPY | Age: 74
Discharge: HOME | End: 2019-11-12
Payer: MEDICARE

## 2019-11-12 NOTE — PROGRESS NOTES
OUTPATIENT DAILY NOTE    NAME/AGE/GENDER: Toan Paredes is a 75 y.o. female. DATE: 11/12/2019    Ms. Smithella Armin for today's appointment due to rain. Lyndsey Jauregui, OT

## 2019-11-14 ENCOUNTER — HOSPITAL ENCOUNTER (OUTPATIENT)
Dept: PHYSICAL THERAPY | Age: 74
Discharge: HOME | End: 2019-11-14
Payer: MEDICARE

## 2019-11-14 PROCEDURE — 97140 MANUAL THERAPY 1/> REGIONS: CPT

## 2019-11-14 NOTE — PROGRESS NOTES
Maury Barahonas  : 1945  Primary: [employer] Medicare Ppo  Secondary:  [address] [place]  at [address]  [address], [address]  [phone]   [phone]           OUTPATIENT OCCUPATIONAL THERAPY: Daily Note 2019    ICD-10: Treatment Diagnosis: I89.0 lymphedema not elsewhere classified,I89.022 lymphedema with obesity, M79.609 pain in limbs  Precautions/Allergies:   Flexeril [cyclobenzaprine]   Fall Risk Score:    Ambulatory/Rehab Services H2 Model Falls Risk Assessment    Risk Factors:       No Risk Factors Identified Ability to Rise from Chair:       (3)  Multiple attempts, but successful    Falls Prevention Plan: Mobility Assistance Device (specify):  cane   Total: (5 or greater = High Risk): 3     [facility] [employer] Patent #[id number]. Federal Law prohibits the replication, distribution or use without written permission from [facility] [employer]     MD Orders: eval and treat MEDICAL/REFERRING DIAGNOSIS:   Lymphedema, not elsewhere classified [I89.0]   DATE OF ONSET: chronic   REFERRING PHYSICIAN: Telma Tejada,*  RETURN PHYSICIAN APPOINTMENT: to be determined      INITIAL ASSESSMENT:  Ms. Zane Muhammad was referred to OT for the evaluation and treatment of bilateral LE lymphedema multifactorial in nature: past DVT, morbid obesity, arthritis/pain in knees leading to immobility. Swelling has been present for many years. Over the years patient has owned velcro compression wraps and compression knee highs but did not wear either for long. She has not worn compression now for many years. Her lifestyle only complicates her lymphedema. She is morbidly obese and very sedentary due to pain in her knees(not a surgical candidate) and spends most of the day with LE's in dependent position, even sleeping sitting up in a chair.  She presents with 2+lymphedema with pitting/fibrotic tissue from foot to knee with disproportionally large thighs with lobular swelling. She has been seen at this facility in the past for an eval only. She did not return for therapy. She states now she is ready to comply with therapy and see improvements in condition. She would benefit from skilled OT for CDT to reduce LE lymphedema, educated pt on self management and explore compression options. PLAN OF CARE:   PROBLEM LIST:  1. Increased Pain  2. Decreased Activity Tolerance  3. Edema/Girth  4. Decreased Skin Integrity/Hygeine INTERVENTIONS PLANNED  1. Skin care  2. Compression bandaging  3. Fitting for compression garment(s)  4. Manual therapy/Manual lymph drainage  5. Therapeutic exercise/Therapeutic activities  6. Patient Education  7. Compression pump trial prn  8.  kinesiotaping    TREATMENT PLAN:  Effective Dates: 10/31/19 to 1/29/2020 Frequency/Duration: 2x/week up to 90 days  2 xweek x90 days  and upon reassessment. Will adjust frequency and duration as progress indicates. GOALS: (Goals have been discussed and agreed upon with patient.)  Short-Term Functional Goals: Time Frame: 45 days  1. The patient/caregiver will verbalize understanding of lymphedema precautions. 2. Patient will be independent with skin care regimen to decrease risk of cellulitis. 3. The patient/caregiver will be independent at donning and doffing left bilateral extremity compression bandages. 4. The patient/caregiver will be independent with self-manual lymph drainage techniques and show decrease in limb volume. 5. Patient will be independent in lymphatic exercises. Discharge Goals: Time Frame: 90 days  1. Patient's bilateral lower extremity circumferential measurements will decrease on volumetric graph by 10-15cm to maximize functional use in ADL's.    2. The patient/caregiver will be independent with home management of lymphedema.     3. Patient/caregiver will be independent donning and doffing bilateral lower extremity compression garment. Rehabilitation Potential For Stated Goals: Guarded due to past history of noncompliance               The information in this section was collected on 11/14/2019   (except where otherwise noted). OCCUPATIONAL PROFILE & HISTORY:   History of Present Injury/Illness (Reason for Referral):  Ms. Ruslan Andrade was referred to OT for the evaluation and treatment of bilateral LE lymphedema multifactorial in nature: past DVT, morbid obesity, arthritis/pain in knees leading to immobility. Swelling has been present for many years. Over the years patient has owned velcro compression wraps and compression knee highs but did not wear either for long. She has not worn compression now for many years. Her lifestyle only complicates her lymphedema. She is morbidly obese and very sedentary due to pain in her knees(not a surgical candidate) and spends most of the day with LE's in dependent position, even sleeping sitting up in a chair. She presents with 2+lymphedema with pitting/fibrotic tissue from foot to knee with disproportionally large thighs with lobular swelling. She has been seen at this facility in the past for an eval only. She did not return for therapy. She states now she is ready to comply with therapy and see improvements in condition. She would benefit from skilled OT for CDT to reduce LE lymphedema, educated pt on self management and explore compression options.    Past Medical History/Comorbidities:   Ms. Ruslan Andrade  has a past medical history of Amyloidosis ([address] Arthritis, Asthma, Autoimmune disease ([address] Chronic obstructive pulmonary disease ([address] Coronary artery disease involving native coronary artery of native heart without angina pectoris (6/6/2017), Dizziness, GERD (gastroesophageal reflux disease), Graves disease, H/O therapeutic radiation, Hiatal hernia, History of anticoagulant therapy, History of asthma, History of GI bleed (02/2015), History of pneumonia, History of TIA (transient ischemic attack), Hyperlipidemia, Hypertension, Hypothyroidism, Iron deficiency anemia, Morbid obesity ([address] Osteoarthritis, Pulmonary infiltrate, Seizures ([address] Severe aortic stenosis, SOB (shortness of breath), Thrombocytopenia ([address] Thromboembolus ([facility] (2015), Tightness in chest, and Unspecified sleep apnea. Ms. Tamiko Stephenson  has a past surgical history that includes hx other surgical; hx colonoscopy; hx endoscopy; and hx wisdom teeth extraction. Social History/Living Environment:     pt lives in tri level home with spouse   Prior Level of Function/Work/Activity:  Retired - sedentary lifestyle, no regular exercise, does not elevate legs   Dominant Side:         RIGHT  Previous Treatment Approaches:          Pt came to therapy one time for eval but did not follow through with treatment  - she owned compression velcro wraps and knee highs but did not wear - not compliant with compression in the past   Current Medications:    Current Outpatient Medications:     calcium carbonate (CALCIUM 600 PO), Take 1 Tab by mouth daily. , Disp: , Rfl:     diclofenac (VOLTAREN) 1 % gel, Apply  to affected area four (4) times daily. , Disp: , Rfl:     glucosam-chondro-herb 149-hyal (GLUCOS CHOND CPLX ADVANCED PO), Take 2 Tabs by mouth two (2) times a day., Disp: , Rfl:     furosemide (LASIX) 20 mg tablet, Take 1 Tab by mouth daily. , Disp: 30 Tab, Rfl: 5    potassium chloride (KLOR-CON) 10 mEq tablet, Take 1 Tab by mouth daily. , Disp: 30 Tab, Rfl: 5    clopidogrel (PLAVIX) 75 mg tab, Take 1 Tab by mouth daily. , Disp: 30 Tab, Rfl: 6    aspirin 81 mg chewable tablet, Take 1 Tab by mouth daily. , Disp: , Rfl:     levETIRAcetam (KEPPRA) 750 mg tablet, Take 750 mg by mouth two (2) times a day., Disp: , Rfl:     rosuvastatin (CRESTOR) 20 mg tablet, Take 20 mg by mouth nightly., Disp: , Rfl:     levothyroxine (SYNTHROID) 112 mcg tablet, Take  by mouth Daily (before breakfast). , Disp: , Rfl:     omeprazole (PRILOSEC) 40 mg capsule, Take 40 mg by mouth daily. , Disp: , Rfl:     verapamil (CALAN) 240 mg capsule, Take 240 mg by mouth daily. , Disp: , Rfl:     loratadine (CLARITIN) 10 mg tablet, Take 10 mg by mouth nightly., Disp: , Rfl:             Date Last Reviewed:  11/14/2019   Complexity Level : Expanded review of therapy/medical records (1-2):  MODERATE COMPLEXITY   ASSESSMENT OF OCCUPATIONAL PERFORMANCE:   Palpation:          Bilateral LE lymphedema with swelling from foot to inguinal crease: pitting edema distal end of LE's/ankles/feet - large dorsal humps with toe involvement - large lobules around knees /medial thighs - thighs are disproportionally large in comparison with lower legs   Strength:          Generalized decreased strength   Special Tests:          Stemmer sign positive  Skin Integrity:          Pt uses coconut oil daily - skin is intact but with changes ie hyperpigmentation, thickening   Sensation:  Intact per pt   Functional Mobility:  Pt ambulates laboriously with straight cane and decreased activity tolerance   Activities of Daily Living : spouse assists pt as needed   Edema/Girth:  2+ and pitting   PRETREATMENT AFFECTED LIMB(s): lower extremity      Date:  10-31-19         Right / Left           Groin   []      []           8 inches   []      []           4 inches   []      []         PoplitealSpace   [x]      [x] 59.5/62          8 inches   [x]      [x] 41/43          4 inches   [x]      [x] 31/33          Ankle   [x]      [x] 32/34          Instep   [x]      [x] 25/25        Measurements are taken in centimeters:  2.54 cm = 1 inch  Total:right 188.5cm        left 197.5cm              Physical Skills Involved:  1. Strength  2. Activity Tolerance  3. Pain (Chronic)  4. Edema  5. Skin Integrity Cognitive Skills Affected (resulting in the inability to perform in a timely and safe manner):  1. Psychosocial Skills Affected:  1. Habits/Routines  2.  Social Interaction   Number of elements that affect the Plan of Care: 3-5:  MODERATE COMPLEXITY   CLINICAL DECISION MAKING:   Outcome Measure: Tool Used: Tool Used: Lymphedema Life Impact Scale   Score:  Initial: 37 Most Recent: X (Date: -- )   Interpretation of Score: The Lymphedema Life Impact Scale (LLIS) is a validated instrument that measures the physical, functional, and psychosocial concerns pertinent to patients with extremity lymphedema. The Scale's questionnaire is administered to patients to gauge impairments, activity limitations, and participation restrictions resulting from their lymphedema. Score 0 1-13 14-26 27-40 41-54 55-67 68   Modifier CH CI CJ CK CL CM CN     ? Other PT/OT Primary Functional Limitations:     - CURRENT STATUS: CK - 40%-59% impaired, limited or restricted    - GOAL STATUS: CJ - 20%-39% impaired, limited or restricted    - D/C STATUS:  ---------------To be determined---------------       Medical Necessity:   · Skilled intervention continues to be required due to uncontrolled swelling increasing risk of cellulitis and hindering ADL's. Reason for Services/Other Comments:  · Patient continues to require skilled intervention due to inability to self manage lymphedema at this time. Clinical Decision-Making Assessment:  Pt has past history of non compliance with compression/ therapy. Swelling is complicated by morbid obesity, arthritis/pain in knees    Use of outcome tool(s) and clinical judgement create a POC that gives a: Questionable prediction of patient's progress: MODERATE COMPLEXITY   TREATMENT:   (In addition to Assessment/Re-Assessment sessions the following treatments were rendered)    Pre-treatment Symptoms/Complaints: Pt's  is bandaging her at home. Pain: Initial:   Pain Intensity 1: 9  Post Session:  9   Occupational Therapy Treatments:      Therapeutic Exercise ( minutes):    reduction/self management practices as outlined by the [employer]ymp[employer]. HEP:  As above; handouts given to patient for all exercises.   Manual Therapy:(60min): Pt received skin care, modified MLD and multi layer bandaging to BLE'. Encouraged to perform ankle pumps and walk with bandages on to aid in promoting lymphatic flow. Manual Lymph Drainage:          Lymph Nodes:    Cervical Supraclavicular Axillary Abdominal Inguinal Popliteal Antecubital   RIGHT []     []     []     []     [x]     [x]     [x]       LEFT []     []     []     []     [x]     [x]     [x]          Anastamoses:   Axillo-axillary Inguino-inguinal Axillo-inguinal Inguino-axillary   ANTERIOR []     []     []     []       POSTERIOR []     []     []     []       RIGHT []     []     []     []       LEFT []     []     []     []         Limbs:   []    RUE     []    LUE     [x]    RLE    [x]    LLE   Compression: (30 minutes): After initial skin care treatment, a multi layered compression bandage was applied from foot to knee bilaterally. She was instructed to remove bandages if any medical complications.  is bandaging at home. Treatment/Session Assessment:    · Response to Treatment:  Pt has complicated lymphedema with very large lobular thighs and fibrotic/pitting calves and feet. Swelling is further complicated by immobility from pain/arthiritis in knees(she is not a surgical candidate) and morbid obesity. She appears compliant with bandaging and has support from spouse. Recommend pool exercises at [address] to increase activity tolerance and aid in reabsorption of fluid. · Compliance with Program/Exercises: Will assess as treatment progresses. · Recommendations/Intent for next treatment session: \"Next visit will focus on complete decongestive therapy - reduction phase\".   Total Treatment Duration:60min  OT Patient Time In/Time Out  Time In: 1200  Time Out: Delia Reese 96, OT

## 2019-11-18 ENCOUNTER — HOSPITAL ENCOUNTER (OUTPATIENT)
Dept: PHYSICAL THERAPY | Age: 74
Discharge: HOME | End: 2019-11-18
Payer: MEDICARE

## 2019-11-18 NOTE — PROGRESS NOTES
OUTPATIENT DAILY NOTE    NAME/AGE/GENDER: Blanca Huang is a 75 y.o. female. DATE: 11/18/2019    Ms. Luis Hook cancelled today's appointment. Will follow up at her next scheduled visit.     Cindy Sears, OT

## 2019-11-20 ENCOUNTER — HOSPITAL ENCOUNTER (OUTPATIENT)
Dept: PHYSICAL THERAPY | Age: 74
Discharge: HOME | End: 2019-11-20
Payer: MEDICARE

## 2019-11-20 NOTE — PROGRESS NOTES
OUTPATIENT DAILY NOTE    NAME/AGE/GENDER: Khurram John is a 75 y.o. female. DATE: 11/20/2019    Ms. Rebeka Collins cancelled today's appointment. Will follow up at her next scheduled visit.     Stephane Travis OT

## 2019-11-25 ENCOUNTER — HOSPITAL ENCOUNTER (OUTPATIENT)
Dept: PHYSICAL THERAPY | Age: 74
Discharge: HOME | End: 2019-11-25
Payer: MEDICARE

## 2019-11-25 PROCEDURE — 97140 MANUAL THERAPY 1/> REGIONS: CPT

## 2019-11-25 NOTE — PROGRESS NOTES
Claudene Olszewski  : 1945  Primary: Shakira Denny Medicare Ppo  Secondary:  [address] at [facility]  [address], [address]  [phone]   [phone]           OUTPATIENT OCCUPATIONAL THERAPY: Daily Note 2019    ICD-10: Treatment Diagnosis: I89.0 lymphedema not elsewhere classified,I89.022 lymphedema with obesity, M79.609 pain in limbs  Precautions/Allergies:   Flexeril [cyclobenzaprine]   Fall Risk Score:    Ambulatory/Rehab Services H2 Model Falls Risk Assessment    Risk Factors:       No Risk Factors Identified Ability to Rise from Chair:       (3)  Multiple attempts, but successful    Falls Prevention Plan: Mobility Assistance Device (specify):  cane   Total: (5 or greater = High Risk): 3     [facility] [address] States Patent #[id number]. Federal Law prohibits the replication, distribution or use without written permission from [facility] [employer]     MD Orders: eval and treat MEDICAL/REFERRING DIAGNOSIS:   Lymphedema, not elsewhere classified [I89.0]   DATE OF ONSET: chronic   REFERRING PHYSICIAN: Telma Tejada,*  RETURN PHYSICIAN APPOINTMENT: to be determined      INITIAL ASSESSMENT:  Ms. Merry Ricks was referred to OT for the evaluation and treatment of bilateral LE lymphedema multifactorial in nature: past DVT, morbid obesity, arthritis/pain in knees leading to immobility. Swelling has been present for many years. Over the years patient has owned velcro compression wraps and compression knee highs but did not wear either for long. She has not worn compression now for many years. Her lifestyle only complicates her lymphedema. She is morbidly obese and very sedentary due to pain in her knees(not a surgical candidate) and spends most of the day with LE's in dependent position, even sleeping sitting up in a chair.  She presents with 2+lymphedema with pitting/fibrotic tissue from foot to knee with disproportionally large thighs with lobular swelling. She has been seen at this facility in the past for an eval only. She did not return for therapy. She states now she is ready to comply with therapy and see improvements in condition. She would benefit from skilled OT for CDT to reduce LE lymphedema, educated pt on self management and explore compression options. PLAN OF CARE:   PROBLEM LIST:  1. Increased Pain  2. Decreased Activity Tolerance  3. Edema/Girth  4. Decreased Skin Integrity/Hygeine INTERVENTIONS PLANNED  1. Skin care  2. Compression bandaging  3. Fitting for compression garment(s)  4. Manual therapy/Manual lymph drainage  5. Therapeutic exercise/Therapeutic activities  6. Patient Education  7. Compression pump trial prn  8.  kinesiotaping    TREATMENT PLAN:  Effective Dates: 10/31/19 to 1/29/2020 Frequency/Duration: 2x/week up to 90 days  2 xweek x90 days  and upon reassessment. Will adjust frequency and duration as progress indicates. GOALS: (Goals have been discussed and agreed upon with patient.)  Short-Term Functional Goals: Time Frame: 45 days  1. The patient/caregiver will verbalize understanding of lymphedema precautions. 2. Patient will be independent with skin care regimen to decrease risk of cellulitis. 3. The patient/caregiver will be independent at donning and doffing left bilateral extremity compression bandages. 4. The patient/caregiver will be independent with self-manual lymph drainage techniques and show decrease in limb volume. 5. Patient will be independent in lymphatic exercises. Discharge Goals: Time Frame: 90 days  1. Patient's bilateral lower extremity circumferential measurements will decrease on volumetric graph by 10-15cm to maximize functional use in ADL's.    2. The patient/caregiver will be independent with home management of lymphedema.     3. Patient/caregiver will be independent donning and doffing bilateral lower extremity compression garment. Rehabilitation Potential For Stated Goals: Guarded due to past history of noncompliance               The information in this section was collected on 11/25/2019   (except where otherwise noted). OCCUPATIONAL PROFILE & HISTORY:   History of Present Injury/Illness (Reason for Referral):  Ms. Denisse Meek was referred to OT for the evaluation and treatment of bilateral LE lymphedema multifactorial in nature: past DVT, morbid obesity, arthritis/pain in knees leading to immobility. Swelling has been present for many years. Over the years patient has owned velcro compression wraps and compression knee highs but did not wear either for long. She has not worn compression now for many years. Her lifestyle only complicates her lymphedema. She is morbidly obese and very sedentary due to pain in her knees(not a surgical candidate) and spends most of the day with LE's in dependent position, even sleeping sitting up in a chair. She presents with 2+lymphedema with pitting/fibrotic tissue from foot to knee with disproportionally large thighs with lobular swelling. She has been seen at this facility in the past for an eval only. She did not return for therapy. She states now she is ready to comply with therapy and see improvements in condition. She would benefit from skilled OT for CDT to reduce LE lymphedema, educated pt on self management and explore compression options.    Past Medical History/Comorbidities:   Ms. Denisse Meek  has a past medical history of Amyloidosis ([address] Arthritis, Asthma, Autoimmune disease ([address] Chronic obstructive pulmonary disease ([address] Coronary artery disease involving native coronary artery of native heart without angina pectoris (6/6/2017), Dizziness, GERD (gastroesophageal reflux disease), Graves disease, H/O therapeutic radiation, Hiatal hernia, History of anticoagulant therapy, History of asthma, History of GI bleed (02/2015), History of pneumonia, History of TIA (transient ischemic attack), Hyperlipidemia, Hypertension, Hypothyroidism, Iron deficiency anemia, Morbid obesity ([address] Osteoarthritis, Pulmonary infiltrate, Seizures ([address] Severe aortic stenosis, SOB (shortness of breath), Thrombocytopenia ([address] Thromboembolus ([facility] (2015), Tightness in chest, and Unspecified sleep apnea. Ms. Rebeka Collins  has a past surgical history that includes hx other surgical; hx colonoscopy; hx endoscopy; and hx wisdom teeth extraction. Social History/Living Environment:     pt lives in tri level home with spouse   Prior Level of Function/Work/Activity:  Retired - sedentary lifestyle, no regular exercise, does not elevate legs   Dominant Side:         RIGHT  Previous Treatment Approaches:          Pt came to therapy one time for eval but did not follow through with treatment  - she owned compression velcro wraps and knee highs but did not wear - not compliant with compression in the past   Current Medications:    Current Outpatient Medications:     calcium carbonate (CALCIUM 600 PO), Take 1 Tab by mouth daily. , Disp: , Rfl:     diclofenac (VOLTAREN) 1 % gel, Apply  to affected area four (4) times daily. , Disp: , Rfl:     glucosam-chondro-herb 149-hyal (GLUCOS CHOND CPLX ADVANCED PO), Take 2 Tabs by mouth two (2) times a day., Disp: , Rfl:     furosemide (LASIX) 20 mg tablet, Take 1 Tab by mouth daily. , Disp: 30 Tab, Rfl: 5    potassium chloride (KLOR-CON) 10 mEq tablet, Take 1 Tab by mouth daily. , Disp: 30 Tab, Rfl: 5    clopidogrel (PLAVIX) 75 mg tab, Take 1 Tab by mouth daily. , Disp: 30 Tab, Rfl: 6    aspirin 81 mg chewable tablet, Take 1 Tab by mouth daily. , Disp: , Rfl:     levETIRAcetam (KEPPRA) 750 mg tablet, Take 750 mg by mouth two (2) times a day., Disp: , Rfl:     rosuvastatin (CRESTOR) 20 mg tablet, Take 20 mg by mouth nightly., Disp: , Rfl:     levothyroxine (SYNTHROID) 112 mcg tablet, Take  by mouth Daily (before breakfast). , Disp: , Rfl:     omeprazole (PRILOSEC) 40 mg capsule, Take 40 mg by mouth daily. , Disp: , Rfl:     verapamil (CALAN) 240 mg capsule, Take 240 mg by mouth daily. , Disp: , Rfl:     loratadine (CLARITIN) 10 mg tablet, Take 10 mg by mouth nightly., Disp: , Rfl:             Date Last Reviewed:  11/25/2019   Complexity Level : Expanded review of therapy/medical records (1-2):  MODERATE COMPLEXITY   ASSESSMENT OF OCCUPATIONAL PERFORMANCE:   Palpation:          Bilateral LE lymphedema with swelling from foot to inguinal crease: pitting edema distal end of LE's/ankles/feet - large dorsal humps with toe involvement - large lobules around knees /medial thighs - thighs are disproportionally large in comparison with lower legs   Strength:          Generalized decreased strength   Special Tests:          Stemmer sign positive  Skin Integrity:          Pt uses coconut oil daily - skin is intact but with changes ie hyperpigmentation, thickening   Sensation:  Intact per pt   Functional Mobility:  Pt ambulates laboriously with straight cane and decreased activity tolerance   Activities of Daily Living : spouse assists pt as needed   Edema/Girth:  2+ and pitting   PRETREATMENT AFFECTED LIMB(s): lower extremity      Date:  10-31-19         Right / Left           Groin   []      []           8 inches   []      []           4 inches   []      []         PoplitealSpace   [x]      [x] 59.5/62          8 inches   [x]      [x] 41/43          4 inches   [x]      [x] 31/33          Ankle   [x]      [x] 32/34          Instep   [x]      [x] 25/25        Measurements are taken in centimeters:  2.54 cm = 1 inch  Total:right 188.5cm        left 197.5cm              Physical Skills Involved:  1. Strength  2. Activity Tolerance  3. Pain (Chronic)  4. Edema  5. Skin Integrity Cognitive Skills Affected (resulting in the inability to perform in a timely and safe manner):  1. Psychosocial Skills Affected:  1. Habits/Routines  2.  Social Interaction   Number of elements that affect the Plan of Care: 3-5:  MODERATE COMPLEXITY   CLINICAL DECISION MAKING:   Outcome Measure: Tool Used: Tool Used: Lymphedema Life Impact Scale   Score:  Initial: 37 Most Recent: X (Date: -- )   Interpretation of Score: The Lymphedema Life Impact Scale (LLIS) is a validated instrument that measures the physical, functional, and psychosocial concerns pertinent to patients with extremity lymphedema. The Scale's questionnaire is administered to patients to gauge impairments, activity limitations, and participation restrictions resulting from their lymphedema. Score 0 1-13 14-26 27-40 41-54 55-67 68   Modifier CH CI CJ CK CL CM CN     ? Other PT/OT Primary Functional Limitations:     - CURRENT STATUS: CK - 40%-59% impaired, limited or restricted    - GOAL STATUS: CJ - 20%-39% impaired, limited or restricted    - D/C STATUS:  ---------------To be determined---------------       Medical Necessity:   · Skilled intervention continues to be required due to uncontrolled swelling increasing risk of cellulitis and hindering ADL's. Reason for Services/Other Comments:  · Patient continues to require skilled intervention due to inability to self manage lymphedema at this time. Clinical Decision-Making Assessment:  Pt has past history of non compliance with compression/ therapy. Swelling is complicated by morbid obesity, arthritis/pain in knees    Use of outcome tool(s) and clinical judgement create a POC that gives a: Questionable prediction of patient's progress: MODERATE COMPLEXITY   TREATMENT:   (In addition to Assessment/Re-Assessment sessions the following treatments were rendered)    Pre-treatment Symptoms/Complaints: Pt cancelled appts last week and did not wear compression. She is mostly interested in getting back to pool exercises to manage her swelling since she has had good results with that in the past. Since she is unable to manage the steps in/out of our pool will request a Rx from MD for aquatic therapy at [place] [address] [facility]. They have a lift. Pain: Initial:   Pain Intensity 1: 9  Post Session:  9   Occupational Therapy Treatments:      Therapeutic Exercise ( minutes):    reduction/self management practices as outlined by the National Lymphedema Network. HEP:  As above; handouts given to patient for all exercises. Manual Therapy:(60min): Pt received skin care, modified MLD and multi layer bandaging to BLE'. Compression options explored ie compression jose pants, Delmon Croon. Pt is hesitant to commint to wearing any compression. She has ordering information if she changes her mind. She wants to wait and start aquatic therapy first.           Manual Lymph Drainage: instructed on deep breathing technique           Lymph Nodes:    Cervical Supraclavicular Axillary Abdominal Inguinal Popliteal Antecubital   RIGHT []     []     []     []     [x]     [x]     [x]       LEFT []     []     []     []     [x]     [x]     [x]          Anastamoses:   Axillo-axillary Inguino-inguinal Axillo-inguinal Inguino-axillary   ANTERIOR []     []     []     []       POSTERIOR []     []     []     []       RIGHT []     []     []     []       LEFT []     []     []     []         Limbs:   []    RUE     []    LUE     [x]    RLE    [x]    LLE   Compression: After initial skin care treatment, a multi layered compression bandage was applied from foot to knee bilaterally. She was instructed to remove bandages if any medical complications. She was encouraged to be compliant with bandaging at home as spouse is willing and able to apply bandages but pt needs max encouragement. She was given ordering information for compression [employer] jose pants and Delmon Croon should she decide to order. Treatment/Session Assessment:    · Response to Treatment:  Pt was not compliant with compression last week and did not come to therapy. Recommended pool exercises at [address] to increase activity tolerance and aid in reabsorption of fluid.  She is in agreement and will return here as needed to help her with her compression needs/lymphedema management. · Compliance with Program/Exercises: compliance issues   · Recommendations/Intent for next treatment session: \"Next visit will focus on complete decongestive therapy - reduction phase\".   Total Treatment Duration:60min  OT Patient Time In/Time Out  Time In: 0100  Time Out: [address], OT

## 2020-02-18 NOTE — THERAPY DISCHARGE
Sonja Yady  : 1945  Primary: Toy Iraheta Medicare Ppo  Secondary:  [address] at [facility]  [address], [facility], [address]  [phone]   [phone]           OUTPATIENT OCCUPATIONAL THERAPY: Discontinuation Summary 2020    ICD-10: Treatment Diagnosis: I89.0 lymphedema not elsewhere classified,I89.022 lymphedema with obesity, M79.609 pain in limbs  Precautions/Allergies:   Flexeril [cyclobenzaprine]   Fall Risk Score:    Ambulatory/Rehab Services H2 Model Falls Risk Assessment    Risk Factors:       No Risk Factors Identified Ability to Rise from Chair:       (3)  Multiple attempts, but successful    Falls Prevention Plan: Mobility Assistance Device (specify):  cane   Total: (5 or greater = High Risk): 3     [facility]list games [facility] [employer] Patent #[id number]. Federal Law prohibits the replication, distribution or use without written permission from [facility] [employer]     MD Orders: eval and treat MEDICAL/REFERRING DIAGNOSIS:   Lymphedema, not elsewhere classified [I89.0]   DATE OF ONSET: chronic   REFERRING PHYSICIAN: Telma Tejada,*  RETURN PHYSICIAN APPOINTMENT: to be determined    Discontinuation Summary: Ms. Lucero Vee was seen for 4 OT visits between 10-31-19 and 19. She canceled 3 times and no showed 1. Ms. Lucero Vee presents with significant LE swelling but is not compliant with wearing compression. Due to the shape of her legs and obesity, bandages are difficult to keep in place and traditional compression garments do not fit her. Alternate compression garments such as velcro compression wraps and compression jose pants were explored but she was not receptive.  Ms. Ry Su goal is to participate in aquatic therapy which she has seen good results from in the past. Due to her decreased mobility, she needs a pool with a lift which we do not have at this facility so Rx was requested from MD to send referral for aquatic therapy to [facility] [place].. Discharge pt from OT. INITIAL ASSESSMENT:  Ms. Tomas Kang was referred to OT for the evaluation and treatment of bilateral LE lymphedema multifactorial in nature: past DVT, morbid obesity, arthritis/pain in knees leading to immobility. Swelling has been present for many years. Over the years patient has owned velcro compression wraps and compression knee highs but did not wear either for long. She has not worn compression now for many years. Her lifestyle only complicates her lymphedema. She is morbidly obese and very sedentary due to pain in her knees(not a surgical candidate) and spends most of the day with LE's in dependent position, even sleeping sitting up in a chair. She presents with 2+lymphedema with pitting/fibrotic tissue from foot to knee with disproportionally large thighs with lobular swelling. She has been seen at this facility in the past for an eval only. She did not return for therapy. She states now she is ready to comply with therapy and see improvements in condition. She would benefit from skilled OT for CDT to reduce LE lymphedema, educated pt on self management and explore compression options. PLAN OF CARE:   PROBLEM LIST:  1. Increased Pain  2. Decreased Activity Tolerance  3. Edema/Girth  4. Decreased Skin Integrity/Hygeine INTERVENTIONS PLANNED  1. Skin care  2. Compression bandaging  3. Fitting for compression garment(s)  4. Manual therapy/Manual lymph drainage  5. Therapeutic exercise/Therapeutic activities  6. Patient Education  7. Compression pump trial prn  8.  kinesiotaping    TREATMENT PLAN:  Effective Dates: 10/31/19 to 1/29/2020 Frequency/Duration: discharge from OT   GOALS: (Goals have been discussed and agreed upon with patient.)  Short-Term Functional Goals: Time Frame: 45 days  1. The patient/caregiver will verbalize understanding of lymphedema precautions. Goal met  2.  Patient will be independent with skin care regimen to decrease risk of cellulitis. Goal met  3. The patient/caregiver will be independent at donning and doffing left bilateral extremity compression bandages. Goal met met-pt declines  4. The patient/caregiver will be independent with self-manual lymph drainage techniques and show decrease in limb volume. Goal met  5. Patient will be independent in lymphatic exercises. Goal met   Discharge Goals: Time Frame: 90 days  1. Patient's bilateral lower extremity circumferential measurements will decrease on volumetric graph by 10-15cm to maximize functional use in ADL's. Ongoing goal  2. The patient/caregiver will be independent with home management of lymphedema. On going goal  3. Patient/caregiver will be independent donning and doffing bilateral lower extremity compression garment.  Goal met met - pt declined    Rehabilitation Potential For Stated Goals: Guarded due to past history of noncompliance

## 2020-05-20 ENCOUNTER — APPOINTMENT (OUTPATIENT)
Dept: PHYSICAL THERAPY | Age: 75
End: 2020-05-20

## 2020-08-06 ENCOUNTER — HOSPITAL ENCOUNTER (OUTPATIENT)
Dept: PHYSICAL THERAPY | Age: 75
Discharge: HOME | End: 2020-08-06
Attending: INTERNAL MEDICINE
Payer: MEDICARE

## 2020-08-06 DIAGNOSIS — I89.0 LYMPHEDEMA: ICD-10-CM

## 2020-08-06 PROCEDURE — 97165 OT EVAL LOW COMPLEX 30 MIN: CPT

## 2020-08-06 PROCEDURE — 97140 MANUAL THERAPY 1/> REGIONS: CPT

## 2020-08-06 NOTE — PROGRESS NOTES
Danny Villalobos  : 1945  Primary: Hugo Mckeon Medicare Ppo  Secondary:  [address] at [address] [place] [address], [address] [facility]  [phone]   [phone]       OUTPATIENT OCCUPATIONAL THERAPY: Daily Treatment Note 2020  Visit Count:  1    ICD-10: Treatment Diagnosis: Lymphedema, not elsewhere classified (I89.0)  Precautions/Allergies:   Flexeril [cyclobenzaprine]   TREATMENT PLAN:  Effective Dates: 2020 TO 2020 (90 days). Frequency/Duration: 2 times a week for 90 Day(s)    Pre-treatment Symptoms/Complaints:  Patient states, \"I don't know what to do for my lymphedema. \"  Pain: Initial: Pain Intensity 1: 7  Pain Location 1: Leg  Pain Orientation 1: Left, Right  Post Session:  0/10   Medications Last Reviewed:  2020  Updated Objective Findings:  See evaluation note from today   Edema/Girth:  non-pitting    Left Right    Initial Most Recent Initial Most Recent   Upper  Extremity           Lower  Extremity 5th Tuberosity (cm): 25  Ankle (cm): 36.1  Calf (cm): 64.4  Mid Knee (cm): 60.6   5th Tuberosity (cm): 24.3(20  cmfrom base of heel)  Ankle (cm): 33.8(10 cm from base of heel)  Calf (cm): 63(30 cm from base of heel)  Mid Knee (cm): 58.2(37 cm from base of heel)          TREATMENT:     Manual Therapy: (25 minutes): Complex decongestive physiotherapy was utilized and necessary because of the patient's stage 3 lymphedema . Patient and spouse educated re: lymphedema diagnosis, risk reduction practices, components of complete decongestive therapy, and HEP of ankle pumps 3-4x/day, 5-10 reps; they verbalized and/or demonstrated understanding. Bilateral legs wrapped from base of toes to just below the popliteal crease with cotton stockinette and padding then short-stretch compression bandages.  Initiated education re: removing bandages to shower every 1-2 days then re-wrap within 1 hour, unless painful then remove immediately; they verbalized understanding. Treatment/Session Summary:    · Response to Treatment:  Patient agreeable to goals and plan of care. · Communication/Consultation:  None today  · Equipment provided today:  Compression bandaging supplies  · Recommendations/Intent for next treatment session: Next visit will focus on complete decongestive therapy.     Total Treatment Billable Duration:  25 minutes in addition to low complexity evaluation  OT Patient Time In/Time Out  Time In: 9792  Time Out: [address] [place] [address], OT    Future Appointments   Date Time Provider Fermin Puentes   8/10/2020  3:15 PM Pk Gutiérrez, OT [facility]   8/12/2020  3:15 PM Pk Gutiérrez, OT [facility]   8/19/2020  3:15 PM Pk Gutiérrez, OT [facility]   8/21/2020 10:15 AM Pk Gutiérrez OT [facility]

## 2020-08-06 NOTE — THERAPY EVALUATION
Ry Quinones 
: 1945 Primary: [employer] Medicare Ppo Secondary:  Therapy Center at [address], [address], [place] [address]. [phone]   [phone] OUTPATIENT OCCUPATIONAL THERAPY:Initial Assessment 2020 ICD-10: Treatment Diagnosis: Lymphedema, not elsewhere classified (I89.0) Precautions/Allergies:  
Flexeril [cyclobenzaprine] TREATMENT PLAN: 
Effective Dates: 2020 TO 2020 (90 days). Frequency/Duration: 2 times a week for 90 Day(s) MEDICAL/REFERRING DIAGNOSIS: 
Lymphedema [I89.0] DATE OF ONSET: 2 years REFERRING PHYSICIAN: Imani Holliday MD Orders: Evaluate and treat Return MD Appointment: unknown INITIAL ASSESSMENT:  Ms. Yolie Silva presents with edema in lower extremities that is consistent with stage 3 lymphedema. She reports she has participated in lymphedema therapy in the past but did not follow through with home management. Feel she may benefit from skilled occupational therapy to maximize independence with home management of lymphedema. PROBLEM LIST (Impacting functional limitations): 1. Decreased Strength 2. Decreased ADL/Functional Activities 3. Edema/Girth INTERVENTIONS PLANNED: (Treatment may consist of any combination of the following) 1. Activities of daily living training 2. Manual therapy training 3. Therapeutic activity 4. Therapeutic exercise GOALS: (Goals have been discussed and agreed upon with patient.) Short-Term Functional Goals: Time Frame: 45 days 1. The patient/caregiver will verbalize understanding of lymphedema precautions. 2. Patient will be independent with skin care regimen. 3. The patient/caregiver will be independent at donning and doffing bilateral lower extremity compression bandages. 4.  Patient will be independent in lymphatic exercises. Discharge Goals: Time Frame: 90 days 1. Patient's bilateral lower extremity circumferential measurements will decrease on volumetric graph by 2-4 cm to maximize functional use in ADL's.  
2. The patient/caregiver will be independent with home management of lymphedema. 3. Patient/caregiver will be independent donning and doffing bilateral lower extremity compression garment. OUTCOME MEASURE:  
Tool Used: Lymphedema Life Impact Scale Score:  Initial: 57 Most Recent: X (Date: -- ) Interpretation of Score: The Lymphedema Life Impact Scale (LLIS) is a validated instrument that measures the physical, functional, and psychosocial concerns pertinent to patients with extremity lymphedema. The Scale's questionnaire is administered to patients to gauge impairments, activity limitations, and participation restrictions resulting from their lymphedema. MEDICAL NECESSITY:  
· Patient demonstrates fair rehab potential due to higher previous functional level. REASON FOR SERVICES/OTHER COMMENTS: 
· Patient continues to require skilled intervention due to decreased independence with home management of lymphedema. Total Duration: OT Patient Time In/Time Out Time In: 9792 Time Out: 1530 Rehabilitation Potential For Stated Goals: Fair Regarding Kathy Arcosels therapy, I certify that the treatment plan above will be carried out by a therapist or under their direction. Thank you for this referral, 
Anjali Oliver, OT Referring Physician Signature: Julita Mao,* _______________________________ Date _____________ PAIN/SUBJECTIVE:  
Initial: Pain Intensity 1: 7 Pain Location 1: Leg 
Pain Orientation 1: Left, Right   Post Session:  0/10 OCCUPATIONAL PROFILE & HISTORY:  
History of Injury/Illness (Reason for Referral): 
Patient has chronic lower extremity lymphedema complicated by morbid obesity. She participated in lymphedema therapy last year but did not follow through with any home management. Past Medical History/Comorbidities: Ms. Pacheco Guillen  has a past medical history of Amyloidosis ([facility] [address] Arthritis, Asthma, Autoimmune disease ([address] Chronic obstructive pulmonary disease ([address] Coronary artery disease involving native coronary artery of native heart without angina pectoris (6/6/2017), Dizziness, GERD (gastroesophageal reflux disease), Graves disease, H/O therapeutic radiation, Hiatal hernia, History of anticoagulant therapy, History of asthma, History of GI bleed (02/2015), History of pneumonia, History of TIA (transient ischemic attack), Hyperlipidemia, Hypertension, Hypothyroidism, Iron deficiency anemia, Morbid obesity ([address] Osteoarthritis, Pulmonary infiltrate, Seizures ([address] Severe aortic stenosis, SOB (shortness of breath), Thrombocytopenia ([address] Thromboembolus ([facility] [address] (2015), Tightness in chest, and Unspecified sleep apnea. Ms. Pacheco Guillen  has a past surgical history that includes hx other surgical; hx colonoscopy; hx endoscopy; and hx wisdom teeth extraction. Social History/Living Environment:  
Home Environment: Private residence # Steps to Enter: 0 One/Two Story Residence: Two story Living Alone: No 
Current DME Used/Available at Home: Kandice Harpreet, straight, Walker, rollator, Walker, rolling, Shower chair Tub or Shower Type: Shower Prior Level of Function/Work/Activity: 
Retired Personal Factors:   
      Sex:  female Age:  67 y.o. Ambulatory/Rehab Services H2 Model Falls Risk Assessment Risk Factors: 
     No Risk Factors Identified Ability to Rise from Chair: 
     (4)  Unable to rise without assistance Falls Prevention Plan: No modifications necessary Total: (5 or greater = High Risk): 4  
©2010 [facility] [address] [place] [address] Patent #[id number]. Federal Law prohibits the replication, distribution or use without written permission from [facility] [employer] Current Medications:   
  
Current Outpatient Medications:   calcium carbonate (CALCIUM 600 PO), Take 1 Tab by mouth daily. , Disp: , Rfl:  
  glucosam-chondro-herb 149-hyal (GLUCOS CHOND CPLX ADVANCED PO), Take 2 Tabs by mouth two (2) times a day., Disp: , Rfl:  
  furosemide (LASIX) 20 mg tablet, Take 1 Tab by mouth daily. , Disp: 30 Tab, Rfl: 5 
  potassium chloride (KLOR-CON) 10 mEq tablet, Take 1 Tab by mouth daily. , Disp: 30 Tab, Rfl: 5 
  clopidogrel (PLAVIX) 75 mg tab, Take 1 Tab by mouth daily. , Disp: 30 Tab, Rfl: 6 
  aspirin 81 mg chewable tablet, Take 1 Tab by mouth daily. , Disp: , Rfl:  
  levETIRAcetam (KEPPRA) 750 mg tablet, Take 750 mg by mouth two (2) times a day., Disp: , Rfl:  
  rosuvastatin (CRESTOR) 20 mg tablet, Take 20 mg by mouth nightly., Disp: , Rfl:  
  levothyroxine (SYNTHROID) 112 mcg tablet, Take  by mouth Daily (before breakfast). , Disp: , Rfl:  
  omeprazole (PRILOSEC) 40 mg capsule, Take 40 mg by mouth daily. , Disp: , Rfl:  
  verapamil (CALAN) 240 mg capsule, Take 240 mg by mouth daily. , Disp: , Rfl:  
  loratadine (CLARITIN) 10 mg tablet, Take 10 mg by mouth nightly., Disp: , Rfl:   
Date Last Reviewed:  8/6/2020 Complexity Level: Expanded review of therapy/medical records (1-2):  MODERATE COMPLEXITY ASSESSMENT OF OCCUPATIONAL PERFORMANCE:  
Palpation:   
      Notable for non-pitting edema in lower extremities ROM:   
      WDLs Strength:   
      Grossly 3+/5 bilateral lower extremities Skin Integrity:   
      Intact Mental Status: · Neurologic State: Alert · Orientation Level: Oriented X4 · Cognition: Appropriate decision making · Perception: Appears intact · Perseveration: No perseveration noted · Safety/Judgement: Insight into deficits Edema/Girth:  non-pitting Left Right Initial Most Recent Initial Most Recent Upper Extremity Lower Extremity 5th Tuberosity (cm): 25 Ankle (cm): 36.1 Calf (cm): 64.4 Mid Knee (cm): 60.6   5th Tuberosity (cm): 24.3(20  cmfrom base of heel) Ankle (cm): 33.8(10 cm from base of heel) Calf (cm): 63(30 cm from base of heel) Mid Knee (cm): 58.2(37 cm from base of heel) Activities of Daily Living:  
       
Basic ADLs (From Assessment) Complex ADLs (From Assessment) Basic ADL Feeding: Modified independent Oral Facial Hygiene/Grooming: Modified Independent Bathing: Minimum assistance Type of Bath: Shower Upper Body Dressing: Minimum assistance Lower Body Dressing: Moderate assistance Toileting: Modified independent Instrumental ADL Meal Preparation: Minimum assistance Homemaking: Moderate assistance Medication Management: Independent Grooming/Bathing/Dressing Activities of Daily Living Cognitive Retraining Safety/Judgement: Insight into deficits Functional Transfers Bathroom Mobility: Minimum assistance Toilet Transfer : Modified independent Shower Transfer: Minimum assistance Car Transfer: Modified independent Bed/Mat Mobility Rolling: Independent Supine to Sit: Independent Sit to Supine: Independent Sit to Stand: Modified independent Stand to Sit: Modified independent Bed to Chair: Modified independent Sensation:  
      Light touch intact bilateral lower extremities Physical Skills Involved: 1. Strength 2. Edema Cognitive Skills Affected (resulting in the inability to perform in a timely and safe manner): 1. None Psychosocial Skills Affected: 1. Habits/Routines Number of elements that affect the Plan of Care[de-identified] 1-3:  LOW COMPLEXITY CLINICAL DECISION MAKING:  
Clinical Decision-Making Assessment:  Patient's lack of follow through after last round of therapy may indicate difficulty with follow through on current plan of care. Assessment process, impact of co-morbidities, assessment modification\need for assistance, and selection of interventions: Analytical Complexity:MODERATE COMPLEXITY

## 2020-08-10 ENCOUNTER — HOSPITAL ENCOUNTER (OUTPATIENT)
Dept: PHYSICAL THERAPY | Age: 75
Discharge: HOME | End: 2020-08-10
Attending: INTERNAL MEDICINE
Payer: MEDICARE

## 2020-08-10 NOTE — PROGRESS NOTES
Claudene Olszewski  : 1945  Primary:   Secondary:  Therapy Center at [address] [place] [address],[address] [facility], [place] [address].  [phone]   [phone]       Ms. Merry Ricks cancelled today's appointment secondary to wanting to hear back from referring physician re: aquatic therapy. She states that she does not think she can do both lymphedema and aquatic therapy at the same time since we do not have a pool with a lift and lymphedema therapy in the same location.      Patrizia Meneses, OT

## 2020-08-12 ENCOUNTER — HOSPITAL ENCOUNTER (OUTPATIENT)
Dept: PHYSICAL THERAPY | Age: 75
Discharge: HOME | End: 2020-08-12
Attending: INTERNAL MEDICINE
Payer: MEDICARE

## 2020-08-12 PROCEDURE — 97140 MANUAL THERAPY 1/> REGIONS: CPT

## 2020-08-12 NOTE — PROGRESS NOTES
Everett Mey  : 1945  Primary: [employer] Medicare Ppo  Secondary:  [address]  at [place]  [address], [address], [address] [facility]  [phone]   [phone]       OUTPATIENT OCCUPATIONAL THERAPY: Daily Treatment Note 2020  Visit Count:  2    ICD-10: Treatment Diagnosis: Lymphedema, not elsewhere classified (I89.0)  Precautions/Allergies:   Flexeril [cyclobenzaprine]   TREATMENT PLAN:  Effective Dates: 2020 TO 2020 (90 days). Frequency/Duration: 2 times a week for 90 Day(s)    Pre-treatment Symptoms/Complaints:  Patient states, \"We just cannot do the bandages at home. I would like to see about the Velcro wraps. \"  Pain: Initial: Pain Intensity 1: 0  Post Session:  0/10   Medications Last Reviewed:  2020  Updated Objective Findings:  See evaluation note from today   Edema/Girth:  non-pitting    Left Right    Initial Most Recent Initial Most Recent   Upper  Extremity           Lower  Extremity                TREATMENT:     Manual Therapy: (30 minutes): Complex decongestive physiotherapy was utilized and necessary because of the patient's stage 3 lymphedema . Patient and spouse educated re: options for static compression garments including the [employer] and Camillus Jayson. Measured for the Aixa Jayson and provided information re: ordering either of these products in lieu of compression bandaging as patient and spouse do not feel it is feasible for them at this time. Treatment/Session Summary:    · Response to Treatment:  Patient considering static compression garments and aquatic therapy at this time. · Communication/Consultation:  None today  · Equipment provided today:  None today  · Recommendations/Intent for next treatment session: Next visit will focus on complete decongestive therapy.     Total Treatment Billable Duration:  30 minutes   OT Patient Time In/Time Out  Time In: 1530  Time Out: [address] [facility]    Future Appointments   Date Time Provider Fermin Puentes   8/19/2020  3:15 PM Marsha Clark OT [facility]   8/21/2020 10:15 AM Marsha Clark OT [facility]

## 2020-08-19 ENCOUNTER — HOSPITAL ENCOUNTER (OUTPATIENT)
Dept: PHYSICAL THERAPY | Age: 75
Discharge: HOME | End: 2020-08-19
Attending: INTERNAL MEDICINE
Payer: MEDICARE

## 2020-08-19 NOTE — PROGRESS NOTES
Shyann Quale  : 1945  Primary: [employer] Medicare Ppo  Secondary:  [address], [address] [place] [address],[address], [address] [facility]  [phone]   [phone]       Ms. Tomas Kang cancelled today's visit due to static compression garments not arriving yet.     Aaron Nguyen OT

## 2020-08-24 ENCOUNTER — HOSPITAL ENCOUNTER (OUTPATIENT)
Dept: PHYSICAL THERAPY | Age: 75
Discharge: HOME | End: 2020-08-24
Attending: INTERNAL MEDICINE
Payer: MEDICARE

## 2020-08-24 PROCEDURE — 97535 SELF CARE MNGMENT TRAINING: CPT

## 2020-08-24 NOTE — PROGRESS NOTES
Mariela Millan  : 1945  Primary: [employer] Medicare Ppo  Secondary:  [address] [place] [address]  at [address], [address] [place] [address],[address], [place] [address].  [phone]   [phone]       OUTPATIENT OCCUPATIONAL THERAPY: Daily Treatment Note 2020  Visit Count:  3    ICD-10: Treatment Diagnosis: Lymphedema, not elsewhere classified (I89.0)  Precautions/Allergies:   Flexeril [cyclobenzaprine]   TREATMENT PLAN:  Effective Dates: 2020 TO 2020 (90 days). Frequency/Duration: 2 times a week for 90 Day(s)    Pre-treatment Symptoms/Complaints:  Patient states, \"My feet were swollen on Friday. The velcro wraps fit fine; I did not bring them. It is the socks we had trouble with so I brought those. This is all really comfortable; I think I will be able to wear these everyday. \"  Pain: Initial: Pain Intensity 1: 0  Post Session:  0/10   Medications Last Reviewed:  2020  Updated Objective Findings:  None Today   Edema/Girth:  non-pitting    Left Right    Initial Most Recent Initial Most Recent   Upper  Extremity           Lower  Extremity                TREATMENT:     Self Care: (30 minutes): Procedure(s) (per grid) utilized to improve and/or restore self-care/home management as related to home management of lymphedema. Required moderate visual and verbal cueing to facilitate donning/doffing of static compression garments. Donned bilateral Farrow hybrid liners; assessed and confirmed proper fit then educated patient and spouse re: donning/doffing techniques including use of gloves with  and pulling them on a little at a time and working them up the foot and leg slowly; they verbalized and demonstrated understanding. Educated to wear static compression garments (liner and Velcro wraps) daily during waking hours; she verbalized understanding.        Treatment/Session Summary:    · Response to Treatment:  Patient tolerating use of static compression garments without complaint. She is agreeable to wear daily then return on September 23, 2020 to assess progress and to call before then with any questions or concerns. Patient ordered a second pair of hybrid liners. · Communication/Consultation:  None today  · Equipment provided today:  None today  · Recommendations/Intent for next treatment session: Next visit will focus on complete decongestive therapy.     Total Treatment Billable Duration:  30 minutes   OT Patient Time In/Time Out  Time In: 1100  Time Out: 6160 [facility], OT    Future Appointments   Date Time Provider Fermin Puentes   9/23/2020  1:45 PM [facility]

## 2020-08-26 ENCOUNTER — APPOINTMENT (OUTPATIENT)
Dept: PHYSICAL THERAPY | Age: 75
End: 2020-08-26
Attending: INTERNAL MEDICINE
Payer: MEDICARE

## 2021-08-03 PROBLEM — K21.9 GERD (GASTROESOPHAGEAL REFLUX DISEASE): Status: RESOLVED | Noted: 2021-08-03 | Resolved: 2021-08-03

## 2022-03-18 PROBLEM — R05.9 COUGH: Status: ACTIVE | Noted: 2017-07-07

## 2022-03-18 PROBLEM — I50.32 CHRONIC DIASTOLIC CONGESTIVE HEART FAILURE (HCC): Status: ACTIVE | Noted: 2017-06-27

## 2022-03-18 PROBLEM — N17.9 ACUTE RENAL FAILURE (ARF) (HCC): Status: ACTIVE | Noted: 2017-06-29

## 2022-03-19 PROBLEM — Z95.2 S/P TAVR (TRANSCATHETER AORTIC VALVE REPLACEMENT): Status: ACTIVE | Noted: 2017-06-27

## 2022-03-19 PROBLEM — E85.9 AMYLOIDOSIS (HCC): Status: ACTIVE | Noted: 2017-05-03

## 2022-03-19 PROBLEM — I35.0 AORTIC STENOSIS: Status: ACTIVE | Noted: 2017-06-27

## 2022-03-19 PROBLEM — I63.9 CVA (CEREBROVASCULAR ACCIDENT) (HCC): Status: ACTIVE | Noted: 2017-02-08

## 2022-03-19 PROBLEM — Q21.10 ATRIAL SEPTAL DEFECT: Status: ACTIVE | Noted: 2017-02-08

## 2022-03-20 PROBLEM — I35.0 NONRHEUMATIC AORTIC VALVE STENOSIS: Status: ACTIVE | Noted: 2017-02-08

## 2022-10-31 ENCOUNTER — TELEPHONE (OUTPATIENT)
Dept: CARDIOLOGY CLINIC | Age: 77
End: 2022-10-31

## 2022-10-31 NOTE — TELEPHONE ENCOUNTER
Patient notified of physician's response and recommendations. She voiced understanding and requested a refill on furosemide.

## 2022-10-31 NOTE — TELEPHONE ENCOUNTER
Pt is on two diuretic pills (lasix & spironolactone) and would like to know if she is supposed to take these at the same time or if she can space them out.

## 2022-11-01 RX ORDER — FUROSEMIDE 80 MG
80 TABLET ORAL DAILY
Qty: 90 TABLET | Refills: 3 | Status: SHIPPED | OUTPATIENT
Start: 2022-11-01

## 2022-11-11 NOTE — PROGRESS NOTES
[facility] CARDIOLOGY  [address] [facility], [address]  PHONE: [phone]      22    NAME:  Jhon Powell  : 1945  MRN: [id number]         SUBJECTIVE:   Jhon Powell is a 65 y.o. female seen for a follow up visit regarding the following:     Chief Complaint   Patient presents with    Follow-up Chronic Condition     6 month follow up    Results     Echo follow up              HPI:  Follow up  Follow-up Chronic Condition (6 month follow up) and Results (Echo follow up)   . Follow up prior TAVR, morbid obesity, lymphedema. 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 Since doing so, she's felt better. She also was not using her compression pumps but has resumed using them twice a day, again at her family's urging, and has started using stationary bike pedals! Past cardiac history:   mild (to moderate) but mean gradient only 15, AFNNY 1.3, current symptoms not attributable to this   12: moderate, peak gradient 42, mean 23.     Dec 2014 - moderate - mean 33, peak 50, FANNY 0.9, EF 57%   2017 - EF 60-65%, severe AS, mean gradient 42, peak 65, FANNY 0.76 sq cm, mild AI, mild MR   2017 - Transcatheter aortic valve replacement (23 mm White Aye 3 transcatheter aortic valve via right common femoral artery percutaneous approach)   Aug 2017 - peak gradient 41, mean 19   May 2018 - peak gradient 38, mean 22,  normal LV function   2019- EF > 70%, normal AVR, peak gradient 46 d/t hyperdynamic function NOT stenosis   Oct 2020- Echo EF 60-65%, abnormal DF, normal TAVR mean gradient 10   Oct 2021- EF 55-60%, abn DF, normal AVR, mean gradient 19  2022       Echo - EF 59%, normal AVR, mean gradient 19          Key CAD CHF Meds            rosuvastatin (CRESTOR) 40 MG tablet (Taking)    Class: Historical Med    furosemide (LASIX) 80 MG tablet (Taking)    Sig - Route: Take 1 tablet by mouth daily - Oral    spironolactone (ALDACTONE) 25 MG tablet (Taking)    Class: Historical Med    verapamil (VERELAN) 240 MG extended release capsule (Taking)    Class: Historical Med              Past Medical History, Past Surgical History, Family history, Social History, and Medications were all reviewed with the patient today and updated as necessary. Prior to Admission medications    Medication Sig Start Date End Date Taking?  Authorizing Provider   levothyroxine (SYNTHROID) 175 MCG tablet Take 175 mcg by mouth Daily   Yes Historical Provider, MD   rosuvastatin (CRESTOR) 40 MG tablet Take 40 mg by mouth every evening   Yes Historical Provider, MD   furosemide (LASIX) 80 MG tablet Take 1 tablet by mouth daily 11/1/22  Yes William Magaña MD   albuterol sulfate  (90 Base) MCG/ACT inhaler Inhale 2 puffs into the lungs every 4 hours as needed 11/11/21  Yes Ar Automatic Reconciliation   aspirin 81 MG chewable tablet Take 81 mg by mouth daily 6/29/17  Yes Ar Automatic Reconciliation   budesonide-formoterol (SYMBICORT) 160-4.5 MCG/ACT AERO Inhale 2 puffs into the lungs daily 4/30/21  Yes Ar Automatic Reconciliation   folic acid (FOLVITE) 1 MG tablet Take by mouth daily   Yes Ar Automatic Reconciliation   levETIRAcetam (KEPPRA) 750 MG tablet Take 750 mg by mouth 2 times daily   Yes Ar Automatic Reconciliation   loratadine (CLARITIN) 10 MG tablet Take 10 mg by mouth   Yes Ar Automatic Reconciliation   omeprazole (PRILOSEC) 40 MG delayed release capsule Take 40 mg by mouth daily   Yes Ar Automatic Reconciliation   potassium chloride (KLOR-CON M) 10 MEQ extended release tablet Take 10 mEq by mouth daily 7/26/19  Yes Ar Automatic Reconciliation   spironolactone (ALDACTONE) 25 MG tablet Take 25 mg by mouth daily 11/12/21  Yes Ar Automatic Reconciliation   verapamil (VERELAN) 240 MG extended release capsule Take 240 mg by mouth daily   Yes Ar Automatic Reconciliation     Allergies   Allergen Reactions    Cyclobenzaprine Other (See Comments)     Kandace Hull, \"stoke like symptoms\"     Past Medical History:   Diagnosis Date    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    Arthritis     Asthma     in past    Autoimmune disease ([facility]     Chronic obstructive pulmonary disease ([facility]     amlyidosis    Dizziness     GERD (gastroesophageal reflux disease)     manage well with meds    Graves disease     H/O therapeutic radiation     Hiatal hernia     History of anticoagulant therapy     treated with Eliquis for about 6 months for LLE DVT-Eliquis D/C 2/2016    History of asthma     History of GI bleed 02/2015    History of pneumonia     History of TIA (transient ischemic attack)     about 16 years ago    Hyperlipidemia     Hypertension     controlled well with meds    Hypothyroidism     managed well with meds    Iron deficiency anemia     followed by Dr Isaiah Dickey- hx of DVT treated with Eliquis, received 2 Units of PRBC    Morbid obesity ([facility]     BMI 56    Osteoarthritis     Pulmonary infiltrate     pt not aware of    Seizures ([facility]     controlled well with Keppra- last seizure 2015    Severe aortic stenosis     Followed by [facility]    SOB (shortness of breath)     Thrombocytopenia ([facility]     Thromboembolus ([facility] 2015    hx of-LEFT LEG DVT    Tightness in chest     Unspecified sleep apnea     pt no longer uses CPAP- MD discontinue CPAP machine     Past Surgical History:   Procedure Laterality Date    COLONOSCOPY      OTHER SURGICAL HISTORY      lung biopsy & bone marrow biopsy    UPPER GASTROINTESTINAL ENDOSCOPY      WISDOM TOOTH EXTRACTION       Family History   Problem Relation Age of Onset    Diabetes Father     Heart Disease Father     Stroke Father     Heart Disease Mother     Stroke Mother     Stroke Sister     Kidney Disease Sister         on dialysis    Heart Disease Sister     No Known Problems Sister     No Known Problems Sister     Heart Disease Sister     Heart Disease Sister     Heart Disease Sister     Heart Disease Sister     No Known Problems Brother     No Known Problems Brother     Diabetes Brother     Cancer Sister     Heart Disease Sister     Diabetes Sister      Social History     Tobacco Use    Smoking status: Never    Smokeless tobacco: Never   Substance Use Topics    Alcohol use: No       ROS:    Review of Systems   Cardiovascular:  Positive for leg swelling. Negative for chest pain. Respiratory:  Positive for shortness of breath. PHYSICAL EXAM:   /80   Pulse 83   Ht 5' 2\" (1.575 m)   Wt (!) 306 lb (138.8 kg)   BMI 55.97 kg/m²      Wt Readings from Last 3 Encounters:   11/14/22 (!) 306 lb (138.8 kg)   11/04/22 (!) 309 lb (140.2 kg)   05/11/22 (!) 309 lb 9.6 oz (140.4 kg)     BP Readings from Last 3 Encounters:   11/14/22 110/80   11/04/22 120/62   05/11/22 110/80     Pulse Readings from Last 3 Encounters:   11/14/22 83   11/04/22 79   05/11/22 80           Physical Exam  Constitutional:       Appearance: Normal appearance. HENT:      Head: Normocephalic and atraumatic. Eyes:      Conjunctiva/sclera: Conjunctivae normal.   Neck:      Vascular: No carotid bruit. Cardiovascular:      Rate and Rhythm: Normal rate and regular rhythm. Heart sounds: Murmur heard. Blowing early systolic murmur is present with a grade of 2/6 at the upper right sternal border. No friction rub. No gallop. Pulmonary:      Effort: No respiratory distress. Breath sounds: No wheezing or rales. Musculoskeletal:         General: Swelling (bilateral lymphedema, improved) present. Cervical back: Neck supple. Skin:     General: Skin is warm and dry. Neurological:      General: No focal deficit present. Mental Status: She is alert.    Psychiatric:         Mood and Affect: Mood normal. Behavior: Behavior normal.       Medical problems and test results were reviewed with the patient today. DATA REVIEW    BMP  Lab Results   Component Value Date/Time     08/09/2019 11:17 AM    K 4.0 08/09/2019 11:17 AM     08/09/2019 11:17 AM    CO2 25 08/09/2019 11:17 AM    BUN 24 08/09/2019 11:17 AM    CREATININE 1.00 08/09/2019 11:17 AM    GLUCOSE 91 08/09/2019 11:17 AM    CALCIUM 8.9 08/09/2019 11:17 AM       8/4/22 1101    Iron 50 - 170 ug/dL 55    TIBC 250 - 450 ug/dL 275    Transferrin 180 - 365 mg/dL 220    Transferrin % Saturation 20 - 50 % 18 Low      8/4/22 1101     WBC 3.5 - 10.8 K/uL 5.6    RBC 3.86 - 5.35 M/uL 4.38    Hemoglobin 11.0 - 15.4 g/dL 13.1    Hematocrit 35.6 - 47.3 % 39.1    MCV 79.0 - 100.0 fL 89.4    MCH 23.7 - 32.9 pg 30.0    MCHC 30.0 - 36.5 g/dL 33.5    RDW-CV 11.6 - 16.0 % 14.5    Platelets 938 - 836 K/uL 119 Low       8/4/22 1101     Ferritin 5.0 - 204.0 ng/mL 47.9       EKG    Sinus  Rhythm 83  normal axis, intervals  LVH  NSTWF    CXR/IMAGING        DEVICE INTERROGATION        OUTSIDE RECORDS REVIEW    Records from outside providers have been reviewed and summarized as noted in the HPI, past history and data review sections of this note, and reviewed with patient. .       ASSESSMENT and PLAN    Clearance Adan was seen today for follow-up chronic condition and results. Diagnoses and all orders for this visit:    Chronic diastolic congestive heart failure (HCC)  -     EKG 12 Lead    S/P TAVR (transcatheter aortic valve replacement)    Morbid obesity ([address]    Essential hypertension        IMPRESSION:    Stable valve disease, continue regular echo surveillance    Congratulated on improved medication and lifestyle adherence, and she is noticing clinical improvement so doing. Lymphedema controlled with meds/compression pumps    BP at target, continue meds          Return in about 6 months (around 5/14/2023).           Thank you for allowing me to participate in this patient's care. Please call or contact me if there are any questions or concerns regarding the above.       Danie Mullins MD  11/14/22  10:54 AM

## 2022-11-14 ENCOUNTER — OFFICE VISIT (OUTPATIENT)
Dept: CARDIOLOGY CLINIC | Age: 77
End: 2022-11-14
Payer: MEDICARE

## 2022-11-14 VITALS
BODY MASS INDEX: 53.92 KG/M2 | SYSTOLIC BLOOD PRESSURE: 110 MMHG | HEART RATE: 83 BPM | DIASTOLIC BLOOD PRESSURE: 80 MMHG | WEIGHT: 293 LBS | HEIGHT: 62 IN

## 2022-11-14 DIAGNOSIS — Z95.2 S/P TAVR (TRANSCATHETER AORTIC VALVE REPLACEMENT): ICD-10-CM

## 2022-11-14 DIAGNOSIS — I50.32 CHRONIC DIASTOLIC CONGESTIVE HEART FAILURE (HCC): Primary | ICD-10-CM

## 2022-11-14 DIAGNOSIS — I10 ESSENTIAL HYPERTENSION: ICD-10-CM

## 2022-11-14 DIAGNOSIS — E66.01 MORBID OBESITY (HCC): ICD-10-CM

## 2022-11-14 PROCEDURE — 1123F ACP DISCUSS/DSCN MKR DOCD: CPT | Performed by: INTERNAL MEDICINE

## 2022-11-14 PROCEDURE — 93000 ELECTROCARDIOGRAM COMPLETE: CPT | Performed by: INTERNAL MEDICINE

## 2022-11-14 PROCEDURE — 3078F DIAST BP <80 MM HG: CPT | Performed by: INTERNAL MEDICINE

## 2022-11-14 PROCEDURE — 99214 OFFICE O/P EST MOD 30 MIN: CPT | Performed by: INTERNAL MEDICINE

## 2022-11-14 PROCEDURE — 3074F SYST BP LT 130 MM HG: CPT | Performed by: INTERNAL MEDICINE

## 2022-11-14 RX ORDER — LEVOTHYROXINE SODIUM 175 UG/1
175 TABLET ORAL DAILY
COMMUNITY

## 2022-11-14 RX ORDER — ROSUVASTATIN CALCIUM 40 MG/1
40 TABLET, COATED ORAL EVERY EVENING
COMMUNITY

## 2022-11-14 ASSESSMENT — ENCOUNTER SYMPTOMS: SHORTNESS OF BREATH: 1

## 2023-07-21 ENCOUNTER — TELEPHONE (OUTPATIENT)
Age: 78
End: 2023-07-21

## 2023-07-24 NOTE — TELEPHONE ENCOUNTER
Patient notified of physician's response and voiced understanding, she has already scheduled an appointment with her PCP to discuss this request.

## 2023-11-01 ENCOUNTER — OFFICE VISIT (OUTPATIENT)
Age: 78
End: 2023-11-01
Payer: MEDICARE

## 2023-11-01 VITALS
SYSTOLIC BLOOD PRESSURE: 114 MMHG | DIASTOLIC BLOOD PRESSURE: 70 MMHG | WEIGHT: 293 LBS | HEIGHT: 61 IN | HEART RATE: 86 BPM | BODY MASS INDEX: 55.32 KG/M2

## 2023-11-01 DIAGNOSIS — Z95.2 S/P TAVR (TRANSCATHETER AORTIC VALVE REPLACEMENT): ICD-10-CM

## 2023-11-01 DIAGNOSIS — Z95.2 HISTORY OF TRANSCATHETER AORTIC VALVE REPLACEMENT (TAVR): ICD-10-CM

## 2023-11-01 DIAGNOSIS — E66.01 MORBID OBESITY (HCC): ICD-10-CM

## 2023-11-01 DIAGNOSIS — I50.32 CHRONIC DIASTOLIC CONGESTIVE HEART FAILURE (HCC): Primary | ICD-10-CM

## 2023-11-01 PROCEDURE — 1123F ACP DISCUSS/DSCN MKR DOCD: CPT | Performed by: INTERNAL MEDICINE

## 2023-11-01 PROCEDURE — 3078F DIAST BP <80 MM HG: CPT | Performed by: INTERNAL MEDICINE

## 2023-11-01 PROCEDURE — 93000 ELECTROCARDIOGRAM COMPLETE: CPT | Performed by: INTERNAL MEDICINE

## 2023-11-01 PROCEDURE — 3074F SYST BP LT 130 MM HG: CPT | Performed by: INTERNAL MEDICINE

## 2023-11-01 PROCEDURE — 99214 OFFICE O/P EST MOD 30 MIN: CPT | Performed by: INTERNAL MEDICINE

## 2023-11-01 RX ORDER — MONTELUKAST SODIUM 10 MG/1
10 TABLET ORAL DAILY
Qty: 30 TABLET | Refills: 11 | COMMUNITY
Start: 2023-09-29 | End: 2024-09-28

## 2023-11-01 ASSESSMENT — ENCOUNTER SYMPTOMS: SHORTNESS OF BREATH: 1

## 2024-05-12 ENCOUNTER — APPOINTMENT (OUTPATIENT)
Dept: GENERAL RADIOLOGY | Age: 79
End: 2024-05-12
Payer: MEDICARE

## 2024-05-12 ENCOUNTER — HOSPITAL ENCOUNTER (EMERGENCY)
Age: 79
Discharge: HOME | End: 2024-05-12
Payer: MEDICARE

## 2024-05-12 ENCOUNTER — APPOINTMENT (OUTPATIENT)
Dept: CT IMAGING | Age: 79
End: 2024-05-12
Payer: MEDICARE

## 2024-05-12 VITALS
SYSTOLIC BLOOD PRESSURE: 165 MMHG | HEART RATE: 94 BPM | OXYGEN SATURATION: 100 % | WEIGHT: 293 LBS | HEIGHT: 61 IN | TEMPERATURE: 98 F | BODY MASS INDEX: 55.32 KG/M2 | DIASTOLIC BLOOD PRESSURE: 80 MMHG | RESPIRATION RATE: 22 BRPM

## 2024-05-12 DIAGNOSIS — J20.9 ACUTE BRONCHITIS, UNSPECIFIED ORGANISM: Primary | ICD-10-CM

## 2024-05-12 DIAGNOSIS — J45.901 MODERATE ASTHMA WITH EXACERBATION, UNSPECIFIED WHETHER PERSISTENT: ICD-10-CM

## 2024-05-12 LAB
ALBUMIN SERPL-MCNC: 2.9 G/DL (ref 3.2–4.6)
ALBUMIN/GLOB SERPL: 0.7 (ref 1–1.9)
ALP SERPL-CCNC: 80 U/L (ref 35–104)
ALT SERPL-CCNC: 23 U/L (ref 12–65)
ANION GAP SERPL CALC-SCNC: 13 MMOL/L (ref 9–18)
AST SERPL-CCNC: 36 U/L (ref 15–37)
BASOPHILS # BLD: 0 K/UL (ref 0–0.2)
BASOPHILS NFR BLD: 1 % (ref 0–2)
BILIRUB SERPL-MCNC: 0.6 MG/DL (ref 0–1.2)
BUN SERPL-MCNC: 30 MG/DL (ref 8–23)
CALCIUM SERPL-MCNC: 9 MG/DL (ref 8.8–10.2)
CHLORIDE SERPL-SCNC: 106 MMOL/L (ref 98–107)
CO2 SERPL-SCNC: 22 MMOL/L (ref 20–28)
CREAT SERPL-MCNC: 1.31 MG/DL (ref 0.6–1.1)
D DIMER PPP FEU-MCNC: 1.72 UG/ML(FEU)
DIFFERENTIAL METHOD BLD: ABNORMAL
EOSINOPHIL # BLD: 0 K/UL (ref 0–0.8)
EOSINOPHIL NFR BLD: 1 % (ref 0.5–7.8)
ERYTHROCYTE [DISTWIDTH] IN BLOOD BY AUTOMATED COUNT: 14.2 % (ref 11.9–14.6)
GLOBULIN SER CALC-MCNC: 4.2 G/DL (ref 2.3–3.5)
GLUCOSE SERPL-MCNC: 102 MG/DL (ref 70–99)
HCT VFR BLD AUTO: 42 % (ref 35.8–46.3)
HGB BLD-MCNC: 13.6 G/DL (ref 11.7–15.4)
IMM GRANULOCYTES # BLD AUTO: 0.1 K/UL (ref 0–0.5)
IMM GRANULOCYTES NFR BLD AUTO: 1 % (ref 0–5)
LYMPHOCYTES # BLD: 1.9 K/UL (ref 0.5–4.6)
LYMPHOCYTES NFR BLD: 26 % (ref 13–44)
MAGNESIUM SERPL-MCNC: 2.1 MG/DL (ref 1.8–2.4)
MCH RBC QN AUTO: 29 PG (ref 26.1–32.9)
MCHC RBC AUTO-ENTMCNC: 32.4 G/DL (ref 31.4–35)
MCV RBC AUTO: 89.6 FL (ref 82–102)
MONOCYTES # BLD: 0.8 K/UL (ref 0.1–1.3)
MONOCYTES NFR BLD: 11 % (ref 4–12)
NEUTS SEG # BLD: 4.5 K/UL (ref 1.7–8.2)
NEUTS SEG NFR BLD: 61 % (ref 43–78)
NRBC # BLD: 0 K/UL (ref 0–0.2)
NT PRO BNP: 2526 PG/ML (ref 0–450)
PLATELET # BLD AUTO: 93 K/UL (ref 150–450)
PMV BLD AUTO: 11.2 FL (ref 9.4–12.3)
POTASSIUM SERPL-SCNC: 3.6 MMOL/L (ref 3.5–5.1)
PROT SERPL-MCNC: 7.1 G/DL (ref 6.3–8.2)
RBC # BLD AUTO: 4.69 M/UL (ref 4.05–5.2)
SODIUM SERPL-SCNC: 141 MMOL/L (ref 136–145)
TROPONIN T SERPL HS-MCNC: 43 NG/L (ref 0–14)
TROPONIN T SERPL HS-MCNC: 52 NG/L (ref 0–14)
WBC # BLD AUTO: 7.3 K/UL (ref 4.3–11.1)

## 2024-05-12 PROCEDURE — 94640 AIRWAY INHALATION TREATMENT: CPT

## 2024-05-12 PROCEDURE — 83735 ASSAY OF MAGNESIUM: CPT

## 2024-05-12 PROCEDURE — 93005 ELECTROCARDIOGRAM TRACING: CPT

## 2024-05-12 PROCEDURE — 84484 ASSAY OF TROPONIN QUANT: CPT

## 2024-05-12 PROCEDURE — 6360000002 HC RX W HCPCS

## 2024-05-12 PROCEDURE — 85379 FIBRIN DEGRADATION QUANT: CPT

## 2024-05-12 PROCEDURE — 99285 EMERGENCY DEPT VISIT HI MDM: CPT

## 2024-05-12 PROCEDURE — 6370000000 HC RX 637 (ALT 250 FOR IP)

## 2024-05-12 PROCEDURE — 80053 COMPREHEN METABOLIC PANEL: CPT

## 2024-05-12 PROCEDURE — 94644 CONT INHLJ TX 1ST HOUR: CPT

## 2024-05-12 PROCEDURE — 96374 THER/PROPH/DIAG INJ IV PUSH: CPT

## 2024-05-12 PROCEDURE — 71260 CT THORAX DX C+: CPT

## 2024-05-12 PROCEDURE — 71045 X-RAY EXAM CHEST 1 VIEW: CPT

## 2024-05-12 PROCEDURE — 2580000003 HC RX 258

## 2024-05-12 PROCEDURE — 83880 ASSAY OF NATRIURETIC PEPTIDE: CPT

## 2024-05-12 PROCEDURE — 85025 COMPLETE CBC W/AUTO DIFF WBC: CPT

## 2024-05-12 PROCEDURE — 94762 N-INVAS EAR/PLS OXIMTRY CONT: CPT

## 2024-05-12 PROCEDURE — 6360000004 HC RX CONTRAST MEDICATION

## 2024-05-12 RX ORDER — BENZONATATE 100 MG/1
100 CAPSULE ORAL 3 TIMES DAILY PRN
Qty: 30 CAPSULE | Refills: 0 | Status: SHIPPED | OUTPATIENT
Start: 2024-05-12 | End: 2024-05-22

## 2024-05-12 RX ORDER — IPRATROPIUM BROMIDE AND ALBUTEROL SULFATE 2.5; .5 MG/3ML; MG/3ML
1 SOLUTION RESPIRATORY (INHALATION) CONTINUOUS
Status: DISCONTINUED | OUTPATIENT
Start: 2024-05-12 | End: 2024-05-12

## 2024-05-12 RX ORDER — PREDNISONE 20 MG/1
20 TABLET ORAL SEE ADMIN INSTRUCTIONS
Qty: 3 TABLET | Refills: 0 | Status: SHIPPED | OUTPATIENT
Start: 2024-05-12 | End: 2024-05-17

## 2024-05-12 RX ORDER — CODEINE PHOSPHATE/GUAIFENESIN 10-100MG/5
10 LIQUID (ML) ORAL 3 TIMES DAILY PRN
Qty: 150 ML | Refills: 0 | Status: SHIPPED | OUTPATIENT
Start: 2024-05-12 | End: 2024-05-17

## 2024-05-12 RX ORDER — BENZONATATE 100 MG/1
100 CAPSULE ORAL
Status: DISCONTINUED | OUTPATIENT
Start: 2024-05-12 | End: 2024-05-12

## 2024-05-12 RX ORDER — PREDNISONE 20 MG/1
20 TABLET ORAL SEE ADMIN INSTRUCTIONS
Qty: 3 TABLET | Refills: 0 | Status: SHIPPED | OUTPATIENT
Start: 2024-05-12 | End: 2024-05-12

## 2024-05-12 RX ORDER — BENZONATATE 100 MG/1
200 CAPSULE ORAL
Status: COMPLETED | OUTPATIENT
Start: 2024-05-12 | End: 2024-05-12

## 2024-05-12 RX ORDER — IPRATROPIUM BROMIDE AND ALBUTEROL SULFATE 2.5; .5 MG/3ML; MG/3ML
1 SOLUTION RESPIRATORY (INHALATION)
Status: COMPLETED | OUTPATIENT
Start: 2024-05-12 | End: 2024-05-12

## 2024-05-12 RX ADMIN — WATER 125 MG: 1 INJECTION INTRAMUSCULAR; INTRAVENOUS; SUBCUTANEOUS at 21:45

## 2024-05-12 RX ADMIN — IOPAMIDOL 100 ML: 755 INJECTION, SOLUTION INTRAVENOUS at 19:19

## 2024-05-12 RX ADMIN — IPRATROPIUM BROMIDE AND ALBUTEROL SULFATE 1 DOSE: 2.5; .5 SOLUTION RESPIRATORY (INHALATION) at 19:59

## 2024-05-12 RX ADMIN — BENZONATATE 200 MG: 100 CAPSULE ORAL at 20:24

## 2024-05-12 RX ADMIN — ALBUTEROL SULFATE 10 MG: 2.5 SOLUTION RESPIRATORY (INHALATION) at 20:43

## 2024-05-12 ASSESSMENT — LIFESTYLE VARIABLES
HOW OFTEN DO YOU HAVE A DRINK CONTAINING ALCOHOL: NEVER
HOW MANY STANDARD DRINKS CONTAINING ALCOHOL DO YOU HAVE ON A TYPICAL DAY: PATIENT DOES NOT DRINK

## 2024-05-12 NOTE — ED PROVIDER NOTES
Emergency Department Provider Note       PCP: Elisabeth Cunningham MD   Age: 78 y.o.   Sex: female     DISPOSITION Decision To Discharge 05/12/2024 10:11:03 PM       ICD-10-CM    1. Acute bronchitis, unspecified organism  J20.9 guaiFENesin-codeine (TUSSI-ORGANIDIN NR) 100-10 MG/5ML syrup      2. Moderate asthma with exacerbation, unspecified whether persistent  J45.901 guaiFENesin-codeine (TUSSI-ORGANIDIN NR) 100-10 MG/5ML syrup          Medical Decision Making     78-year-old female with history of moderate persistent asthma, amyloidosis, EDVIN, chronic diastolic CHF presents with persistent shortness of breath and nonproductive cough for the past week.  Arrives mildly tachypneic but otherwise with stable vital signs, saturating 95% on room air.  Lungs are very rhonchorous and wheezy.  She was apparently at urgent care prior to arrival who felt that she may have had a consolidation on her chest x-ray.  No known fevers or ill contacts.    Differential includes but not limited to asthma exacerbation, bronchitis, pneumonia, pulmonary effusion, CHF exacerbation/pulmonary edema, pulmonary embolism.  Doubt ACS given absence of chest pain.  Will obtain labs and imaging to evaluate for above.    Labs demonstrate moderate elevation of D-dimer and BNP of 2500 but otherwise are fairly unremarkable.  Very minimal SEGUNDO noted.  Serial troponins are mildly elevated but stable.  CTA chest obtained without acute findings - specifically no infiltrate, PE, or effusion.    Patient with continued wheezing and cough despite multiple DuoNebs.  She did have some relief with a continuous nebulizer treatment and a burst dose of Solu-Medrol.  I discussed findings with patient and family present at bedside.  I offered admission due to failure of outpatient therapy which the patient declined which overall felt was reasonable given she has maintained adequate saturations on room air and no significant findings on her workup.  The family states  Hematocrit 42.0 35.8 - 46.3 %    MCV 89.6 82.0 - 102.0 FL    MCH 29.0 26.1 - 32.9 PG    MCHC 32.4 31.4 - 35.0 g/dL    RDW 14.2 11.9 - 14.6 %    Platelets 93 (L) 150 - 450 K/uL    MPV 11.2 9.4 - 12.3 FL    nRBC 0.00 0.0 - 0.2 K/uL    Differential Type AUTOMATED      Neutrophils % 61 43 - 78 %    Lymphocytes % 26 13 - 44 %    Monocytes % 11 4.0 - 12.0 %    Eosinophils % 1 0.5 - 7.8 %    Basophils % 1 0.0 - 2.0 %    Immature Granulocytes % 1 0.0 - 5.0 %    Neutrophils Absolute 4.5 1.7 - 8.2 K/UL    Lymphocytes Absolute 1.9 0.5 - 4.6 K/UL    Monocytes Absolute 0.8 0.1 - 1.3 K/UL    Eosinophils Absolute 0.0 0.0 - 0.8 K/UL    Basophils Absolute 0.0 0.0 - 0.2 K/UL    Immature Granulocytes Absolute 0.1 0.0 - 0.5 K/UL   Comprehensive Metabolic Panel   Result Value Ref Range    Sodium 141 136 - 145 mmol/L    Potassium 3.6 3.5 - 5.1 mmol/L    Chloride 106 98 - 107 mmol/L    CO2 22 20 - 28 mmol/L    Anion Gap 13 9 - 18 mmol/L    Glucose 102 (H) 70 - 99 mg/dL    BUN 30 (H) 8 - 23 MG/DL    Creatinine 1.31 (H) 0.60 - 1.10 MG/DL    Est, Glom Filt Rate 41 (L) >60 ml/min/1.73m2    Calcium 9.0 8.8 - 10.2 MG/DL    Total Bilirubin 0.6 0.0 - 1.2 MG/DL    ALT 23 12 - 65 U/L    AST 36 15 - 37 U/L    Alk Phosphatase 80 35 - 104 U/L    Total Protein 7.1 6.3 - 8.2 g/dL    Albumin 2.9 (L) 3.2 - 4.6 g/dL    Globulin 4.2 (H) 2.3 - 3.5 g/dL    Albumin/Globulin Ratio 0.7 (L) 1.0 - 1.9     Magnesium   Result Value Ref Range    Magnesium 2.1 1.8 - 2.4 mg/dL   Brain Natriuretic Peptide   Result Value Ref Range    NT Pro-BNP 2,526 (H) 0 - 450 PG/ML   D-Dimer, Quantitative   Result Value Ref Range    D-Dimer, Quant 1.72 (H) <0.56 ug/ml(FEU)   Troponin   Result Value Ref Range    Troponin T 52.0 (H) 0 - 14 ng/L   Troponin   Result Value Ref Range    Troponin T 43.0 (H) 0 - 14 ng/L   EKG 12 Lead   Result Value Ref Range    Ventricular Rate 93 BPM    Atrial Rate 92 BPM    QRS Duration 106 ms    Q-T Interval 360 ms    QTc Calculation (Bazett) 448 ms    R  26-Apr-2022 06:28

## 2024-05-12 NOTE — ED TRIAGE NOTES
Patient has been coughing and wheezing for a little over a week. Cough is none productive. Patient came form urgent care where they tried to get an XR. They told her to come here to get better imaging. She has been taking prednisone this week but it is not helping.

## 2024-05-13 LAB
EKG ATRIAL RATE: 92 BPM
EKG DIAGNOSIS: NORMAL
EKG Q-T INTERVAL: 360 MS
EKG QRS DURATION: 106 MS
EKG QTC CALCULATION (BAZETT): 448 MS
EKG R AXIS: 0 DEGREES
EKG T AXIS: 182 DEGREES
EKG VENTRICULAR RATE: 93 BPM

## 2024-05-13 PROCEDURE — 93010 ELECTROCARDIOGRAM REPORT: CPT | Performed by: INTERNAL MEDICINE

## 2024-05-13 NOTE — DISCHARGE INSTRUCTIONS
Prescription for albuterol for nebulizer sent to pharmacy.  I have also refilled your cough syrup and Tessalon Perles.  Do not take any more steroids tomorrow.  Restart the methylprednisolone Dosepak on Tuesday and finish this out.  After you have completed this, start the new taper of prednisone that I have sent to your pharmacy as directed.    Plan to follow-up with your primary doctor next week for recheck.  Please return with any new or worsening symptoms in the interim.

## 2024-05-13 NOTE — FLOWSHEET NOTE
Patient mobility status  with no difficulty. Provider aware     I have reviewed discharge instructions with the patient.  The patient verbalized understanding.    Patient left ED via Discharge Method: wheelchair to Home with Child.    Opportunity for questions and clarification provided.     Patient given 3 scripts.

## 2024-06-11 NOTE — PROGRESS NOTES
89.6 05/12/2024    PLT 93 (L) 05/12/2024           Component  Ref Range & Units 5/12/24 1801   NT Pro-BNP  0 - 450 PG/ML 2,526 High           Ref Range & Units 5/12/24 1845   Troponin T  0 - 14 ng/L 52.0 High        Ref Range & Units 5/12/24 2022 5/12/24 1845   Troponin T  0 - 14 ng/L 43.0 High         4 mo ago    Iron  50 - 170 ug/dL 42 Low    TIBC  250 - 450 ug/dL 293   Transferrin  180 - 365 mg/dL 234   Transferrin % Saturation  20 - 50 % 13 Low        4 mo ago    Ferritin  5.0 - 204.0 ng/mL 76.3       8 mo ago    IgA  85 - 499 mg/dL 564 High        8 mo ago    IgG  610 - 1616 mg/dL 1,474       8 mo ago    IgM  35 - 242 mg/dL 81       8 mo ago    Kappa Light Chains  3.30 - 19.40 mg/L 47.97 High    Lambda Light Chains  5.71 - 26.30 mg/L 26.06   Kappa/Lambda Ratio  0.26 - 1.65 1.84 High      EKG    5/13/24 - NSR, normal axis, intervals, NSTWF, normal voltage    CXR/IMAGING        DEVICE INTERROGATION        OUTSIDE RECORDS REVIEW    Records from outside providers have been reviewed and summarized as noted in the HPI, past history and data review sections of this note, and reviewed with patient. .       ASSESSMENT and PLAN    Krissy Boogie was seen today for chronic diastolic congestive heart failure and hypertension.    Diagnoses and all orders for this visit:    S/P TAVR (transcatheter aortic valve replacement)    Chronic diastolic congestive heart failure (HCC)    Rheumatic aortic stenosis    Morbid obesity (HCC)    Organ-limited amyloidosis (HCC)  -     Echo (TTE) limited (PRN contrast/bubble/strain/3D); Future  -     Nuclear cardiac PYP amyloid study planar; Future          IMPRESSION:    Ms Hahn's clinical status remains somewhat difficult to follow with her chronic dyspnea, lymphedema and markedly sedentary status.  She appears to be fairly stable.  Her echo, however, is showing pretty significant LVH, though images difficult d/t body habitus.  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 Remote

## 2024-06-13 ENCOUNTER — OFFICE VISIT (OUTPATIENT)
Age: 79
End: 2024-06-13
Payer: MEDICARE

## 2024-06-13 VITALS
BODY MASS INDEX: 55.32 KG/M2 | HEART RATE: 88 BPM | WEIGHT: 293 LBS | SYSTOLIC BLOOD PRESSURE: 138 MMHG | DIASTOLIC BLOOD PRESSURE: 62 MMHG | HEIGHT: 61 IN

## 2024-06-13 DIAGNOSIS — E66.01 MORBID OBESITY (HCC): ICD-10-CM

## 2024-06-13 DIAGNOSIS — E85.4 ORGAN-LIMITED AMYLOIDOSIS (HCC): ICD-10-CM

## 2024-06-13 DIAGNOSIS — I50.32 CHRONIC DIASTOLIC CONGESTIVE HEART FAILURE (HCC): ICD-10-CM

## 2024-06-13 DIAGNOSIS — I06.0 RHEUMATIC AORTIC STENOSIS: ICD-10-CM

## 2024-06-13 DIAGNOSIS — Z95.2 S/P TAVR (TRANSCATHETER AORTIC VALVE REPLACEMENT): Primary | ICD-10-CM

## 2024-06-13 PROCEDURE — 99214 OFFICE O/P EST MOD 30 MIN: CPT | Performed by: INTERNAL MEDICINE

## 2024-06-13 PROCEDURE — 1123F ACP DISCUSS/DSCN MKR DOCD: CPT | Performed by: INTERNAL MEDICINE

## 2024-06-13 PROCEDURE — 3075F SYST BP GE 130 - 139MM HG: CPT | Performed by: INTERNAL MEDICINE

## 2024-06-13 PROCEDURE — 3078F DIAST BP <80 MM HG: CPT | Performed by: INTERNAL MEDICINE

## 2024-06-13 RX ORDER — FLUTICASONE PROPIONATE 50 MCG
SPRAY, SUSPENSION (ML) NASAL
COMMUNITY

## 2024-06-13 RX ORDER — LEVOTHYROXINE SODIUM 0.15 MG/1
TABLET ORAL
COMMUNITY
Start: 2024-04-15

## 2024-06-13 RX ORDER — SPIRONOLACTONE 25 MG/1
25 TABLET ORAL DAILY
COMMUNITY

## 2024-06-13 RX ORDER — ACETAMINOPHEN 500 MG
1 TABLET ORAL 2 TIMES DAILY
COMMUNITY
Start: 2024-03-20

## 2024-06-13 RX ORDER — FLUTICASONE FUROATE, UMECLIDINIUM BROMIDE AND VILANTEROL TRIFENATATE 200; 62.5; 25 UG/1; UG/1; UG/1
POWDER RESPIRATORY (INHALATION)
COMMUNITY

## 2024-06-13 ASSESSMENT — ENCOUNTER SYMPTOMS: SHORTNESS OF BREATH: 1

## 2024-07-03 ENCOUNTER — TELEPHONE (OUTPATIENT)
Age: 79
End: 2024-07-03

## 2024-07-03 DIAGNOSIS — E66.01 MORBID OBESITY (HCC): ICD-10-CM

## 2024-07-03 DIAGNOSIS — I50.32 CHRONIC DIASTOLIC CONGESTIVE HEART FAILURE (HCC): Primary | ICD-10-CM

## 2024-07-09 ENCOUNTER — HOSPITAL ENCOUNTER (OUTPATIENT)
Dept: NON INVASIVE DIAGNOSTICS | Age: 79
Discharge: HOME | End: 2024-07-11
Attending: INTERNAL MEDICINE
Payer: MEDICARE

## 2024-07-09 ENCOUNTER — TELEPHONE (OUTPATIENT)
Age: 79
End: 2024-07-09

## 2024-07-09 DIAGNOSIS — E85.4 ORGAN-LIMITED AMYLOIDOSIS (HCC): ICD-10-CM

## 2024-07-09 LAB
ECHO AV AREA PEAK VELOCITY: 0.8 CM2
ECHO AV AREA VTI: 0.8 CM2
ECHO AV MEAN GRADIENT: 32 MMHG
ECHO AV MEAN VELOCITY: 2.7 M/S
ECHO AV PEAK GRADIENT: 46 MMHG
ECHO AV PEAK VELOCITY: 3.4 M/S
ECHO AV VELOCITY RATIO: 0.26
ECHO AV VTI: 79.3 CM
ECHO LA DIAMETER: 4.3 CM
ECHO LV EDV A2C: 89 ML
ECHO LV EDV A4C: 95 ML
ECHO LV EJECTION FRACTION A2C: 51 %
ECHO LV EJECTION FRACTION A4C: 53 %
ECHO LV EJECTION FRACTION BIPLANE: 52 % (ref 55–100)
ECHO LV ESV A2C: 44 ML
ECHO LV ESV A4C: 45 ML
ECHO LV FRACTIONAL SHORTENING: 26 % (ref 28–44)
ECHO LV GLOBAL LONGITUDINAL STRAIN (GLS): -12.4 %
ECHO LV INTERNAL DIMENSION DIASTOLIC: 4.2 CM (ref 3.9–5.3)
ECHO LV INTERNAL DIMENSION SYSTOLIC: 3.1 CM
ECHO LV IVSD: 1.3 CM (ref 0.6–0.9)
ECHO LV MASS 2D: 189.2 G (ref 67–162)
ECHO LV POSTERIOR WALL DIASTOLIC: 1.2 CM (ref 0.6–0.9)
ECHO LV RELATIVE WALL THICKNESS RATIO: 0.57
ECHO LVOT AREA: 3.1 CM2
ECHO LVOT AV VTI INDEX: 0.27
ECHO LVOT DIAM: 2 CM
ECHO LVOT MEAN GRADIENT: 2 MMHG
ECHO LVOT PEAK GRADIENT: 3 MMHG
ECHO LVOT PEAK VELOCITY: 0.9 M/S
ECHO LVOT SV: 66.6 ML
ECHO LVOT VTI: 21.2 CM
ECHO MV AREA VTI: 1.8 CM2
ECHO MV LVOT VTI INDEX: 1.74
ECHO MV MAX VELOCITY: 1.9 M/S
ECHO MV MEAN GRADIENT: 6 MMHG
ECHO MV MEAN VELOCITY: 1.1 M/S
ECHO MV PEAK GRADIENT: 14 MMHG
ECHO MV REGURGITANT PEAK GRADIENT: 202 MMHG
ECHO MV REGURGITANT PEAK VELOCITY: 7.1 M/S
ECHO MV REGURGITANT VTIA: 221 CM
ECHO MV VTI: 36.9 CM

## 2024-07-09 PROCEDURE — 93308 TTE F-UP OR LMTD: CPT | Performed by: INTERNAL MEDICINE

## 2024-07-09 PROCEDURE — 93356 MYOCRD STRAIN IMG SPCKL TRCK: CPT | Performed by: INTERNAL MEDICINE

## 2024-07-09 PROCEDURE — 93321 DOPPLER ECHO F-UP/LMTD STD: CPT

## 2024-07-09 PROCEDURE — 93325 DOPPLER ECHO COLOR FLOW MAPG: CPT | Performed by: INTERNAL MEDICINE

## 2024-07-09 PROCEDURE — 93321 DOPPLER ECHO F-UP/LMTD STD: CPT | Performed by: INTERNAL MEDICINE

## 2024-07-10 ENCOUNTER — TELEPHONE (OUTPATIENT)
Age: 79
End: 2024-07-10

## 2024-07-10 NOTE — TELEPHONE ENCOUNTER
Per Dr. Gil     Would've messaged results, just done yesterday.  I was looking for a particular pattern that might suggest amyloid involving the heart.  That was negative.  Everything else stable.  She still needs the pyp scan at [facility]       Informed patient of response. Voiced understanding.

## 2024-07-10 NOTE — TELEPHONE ENCOUNTER
Patient had an echo yesterday and was wondering if you had a chance to look at it, it was performed downtown.

## 2024-07-18 ENCOUNTER — TELEPHONE (OUTPATIENT)
Age: 79
End: 2024-07-18

## 2024-07-18 NOTE — TELEPHONE ENCOUNTER
You're welcome!  I spoke with patient and did cancel 7/22.  I will follow up next week to make sure she is scheduled for her PYP scan and then get her follow up with Dr. Gil!    Betty

## 2024-07-18 NOTE — TELEPHONE ENCOUNTER
I know of no way to check, would just send a new referral--Betty Singleton is well versed in this if any help is needed. Thanks

## 2024-07-18 NOTE — TELEPHONE ENCOUNTER
Dr Gil told her someone from [facility] would call to set her up an appointment and she has not heard from them What to do? Please call

## 2024-08-06 ENCOUNTER — TELEPHONE (OUTPATIENT)
Age: 79
End: 2024-08-06

## 2024-08-06 NOTE — TELEPHONE ENCOUNTER
Patient called stating she has the following concerns :    Had Amyloid test at [facility] on 8/6  Would like test results when available    Please call and advise.

## 2024-08-06 NOTE — TELEPHONE ENCOUNTER
Called and informed patient that we do not have those results yet it could take a few days since it is a different hospital system, but we will give her a call as soon as we get the results. Voiced understanding.

## 2024-09-05 NOTE — PROGRESS NOTES
[facility] CARDIOLOGY  [address] [place] [address], [address]  [place] [address]  PHONE: [phone]      24    NAME:  Krissy Hahn  : 1945  MRN: [id number]         SUBJECTIVE:   Krissy Hahn is a 79 y.o. female seen for a follow up visit regarding the following:     Chief Complaint   Patient presents with    Results     PYP Scan            HPI:  Follow up  Results (PYP Scan)   .    Follow up prior TAVR, morbid obesity, lymphedema.  Seeing SAAD Mcdonald amyloid confirmed by lung biopsy   and after extensive evaluation appears limited to lung,   echo with strain imaging pattern normal and her PyP scan is not suggestive of cardiac amyloid.     She's lost more than 20 lbs!  Just been controlling portions.  Working on starting pool based PREP at [facility].  Breathing better.                      Past cardiac history:   mild (to moderate) but mean gradient only 15, FANNY 1.3, current symptoms not attributable to this   12: moderate, peak gradient 42, mean 23.    Dec 2014 - moderate - mean 33, peak 50, FANNY 0.9, EF 57%   2017 - EF 60-65%, severe AS, mean gradient 42, peak 65, FANNY 0.76 sq cm, mild AI, mild MR   2017 - Transcatheter aortic valve replacement (23 mm White Aye 3 transcatheter aortic valve via right common femoral artery percutaneous approach)   Aug 2017 - peak gradient 41, mean 19   May 2018 - peak gradient 38, mean 22,  normal LV function   2019- EF > 70%, normal AVR, peak gradient 46 d/t hyperdynamic function NOT stenosis   Oct 2020- Echo EF 60-65%, abnormal DF, normal TAVR mean gradient 10   Oct 2021- EF 55-60%, abn DF, normal AVR, mean gradient 2022       Echo - EF 59%, normal AVR, mean gradient 2024       EF 70-75%, severe LVH, abn DF, mild AI, mod AS, mean gradient 33, SVI 41, poorly seen, mean MV gradient 7 with MV thickening and MAC, consider MARYBETH if high suspicion for MS, DIONY.  HR  throughout       Echo - normal strain

## 2024-09-06 ENCOUNTER — OFFICE VISIT (OUTPATIENT)
Age: 79
End: 2024-09-06
Payer: MEDICARE

## 2024-09-06 VITALS
HEART RATE: 84 BPM | HEIGHT: 61 IN | BODY MASS INDEX: 52.87 KG/M2 | SYSTOLIC BLOOD PRESSURE: 124 MMHG | WEIGHT: 280 LBS | DIASTOLIC BLOOD PRESSURE: 68 MMHG

## 2024-09-06 DIAGNOSIS — Z95.2 S/P TAVR (TRANSCATHETER AORTIC VALVE REPLACEMENT): ICD-10-CM

## 2024-09-06 DIAGNOSIS — I50.32 CHRONIC DIASTOLIC CONGESTIVE HEART FAILURE (HCC): Primary | ICD-10-CM

## 2024-09-06 DIAGNOSIS — E66.01 MORBID OBESITY (HCC): ICD-10-CM

## 2024-09-06 PROCEDURE — 3074F SYST BP LT 130 MM HG: CPT | Performed by: INTERNAL MEDICINE

## 2024-09-06 PROCEDURE — 99214 OFFICE O/P EST MOD 30 MIN: CPT | Performed by: INTERNAL MEDICINE

## 2024-09-06 PROCEDURE — 3078F DIAST BP <80 MM HG: CPT | Performed by: INTERNAL MEDICINE

## 2024-09-06 PROCEDURE — 1123F ACP DISCUSS/DSCN MKR DOCD: CPT | Performed by: INTERNAL MEDICINE

## 2024-09-06 ASSESSMENT — ENCOUNTER SYMPTOMS: SHORTNESS OF BREATH: 1

## 2025-03-04 NOTE — PROGRESS NOTES
[facility] CARDIOLOGY  [address] [place] [address], [address]  [place] [address]  PHONE: [phone]      25    NAME:  Krissy Hahn  : 1945  MRN: [id number]         SUBJECTIVE:   Krissy Hahn is a 79 y.o. female seen for a follow up visit regarding the following:     Chief Complaint   Patient presents with    6 Month Follow-Up    Congestive Heart Failure            HPI:  Follow up  6 Month Follow-Up and Congestive Heart Failure   .    Follow up prior TAVR, morbid obesity, lymphedema.  Seeing SAAD Mcdonald amyloid confirmed by lung biopsy   and after extensive evaluation appears limited to lung,   echo with strain imaging pattern normal and her PyP scan is not suggestive of cardiac amyloid.  Referred to PREP program last visit.  She is down close to 30 lbs!  This despite the program having been suspended for now, they will keep her informed .  She's still using the lymphedema pumps.               Past cardiac history:   mild (to moderate) but mean gradient only 15, FANNY 1.3, current symptoms not attributable to this   12: moderate, peak gradient 42, mean 23.    Dec 2014 - moderate - mean 33, peak 50, FANNY 0.9, EF 57%   2017 - EF 60-65%, severe AS, mean gradient 42, peak 65, FANNY 0.76 sq cm, mild AI, mild MR   2017 - Transcatheter aortic valve replacement (23 mm White Aye 3 transcatheter aortic valve via right common femoral artery percutaneous approach)   Aug 2017 - peak gradient 41, mean 19   May 2018 - peak gradient 38, mean 22,  normal LV function   2019- EF > 70%, normal AVR, peak gradient 46 d/t hyperdynamic function NOT stenosis   Oct 2020- Echo EF 60-65%, abnormal DF, normal TAVR mean gradient 10   Oct 2021- EF 55-60%, abn DF, normal AVR, mean gradient 2022       Echo - EF 59%, normal AVR, mean gradient 2024       EF 70-75%, severe LVH, abn DF, mild AI, mod AS, mean gradient 33, SVI 41, poorly seen, mean MV gradient 7 with MV thickening and MAC,

## 2025-03-05 ENCOUNTER — OFFICE VISIT (OUTPATIENT)
Age: 80
End: 2025-03-05
Payer: MEDICARE

## 2025-03-05 VITALS
BODY MASS INDEX: 52.93 KG/M2 | HEIGHT: 61 IN | DIASTOLIC BLOOD PRESSURE: 62 MMHG | SYSTOLIC BLOOD PRESSURE: 132 MMHG | HEART RATE: 72 BPM

## 2025-03-05 DIAGNOSIS — E66.01 MORBID OBESITY: ICD-10-CM

## 2025-03-05 DIAGNOSIS — Z95.2 S/P TAVR (TRANSCATHETER AORTIC VALVE REPLACEMENT): ICD-10-CM

## 2025-03-05 DIAGNOSIS — I50.32 CHRONIC DIASTOLIC CONGESTIVE HEART FAILURE (HCC): Primary | ICD-10-CM

## 2025-03-05 PROCEDURE — 1123F ACP DISCUSS/DSCN MKR DOCD: CPT | Performed by: INTERNAL MEDICINE

## 2025-03-05 PROCEDURE — 3075F SYST BP GE 130 - 139MM HG: CPT | Performed by: INTERNAL MEDICINE

## 2025-03-05 PROCEDURE — 1160F RVW MEDS BY RX/DR IN RCRD: CPT | Performed by: INTERNAL MEDICINE

## 2025-03-05 PROCEDURE — 1159F MED LIST DOCD IN RCRD: CPT | Performed by: INTERNAL MEDICINE

## 2025-03-05 PROCEDURE — 99214 OFFICE O/P EST MOD 30 MIN: CPT | Performed by: INTERNAL MEDICINE

## 2025-03-05 PROCEDURE — 1126F AMNT PAIN NOTED NONE PRSNT: CPT | Performed by: INTERNAL MEDICINE

## 2025-03-05 PROCEDURE — 3078F DIAST BP <80 MM HG: CPT | Performed by: INTERNAL MEDICINE

## 2025-04-19 RX ORDER — FUROSEMIDE 80 MG/1
80 TABLET ORAL DAILY
Qty: 90 TABLET | Refills: 0 | Status: SHIPPED | OUTPATIENT
Start: 2025-04-19

## 2025-04-21 RX ORDER — FUROSEMIDE 80 MG/1
80 TABLET ORAL DAILY
Qty: 90 TABLET | Refills: 3 | OUTPATIENT
Start: 2025-04-21

## 2025-05-12 ENCOUNTER — TELEPHONE (OUTPATIENT)
Age: 80
End: 2025-05-12

## 2025-05-12 DIAGNOSIS — I89.0 LYMPHEDEMA: Primary | ICD-10-CM

## 2025-05-12 NOTE — TELEPHONE ENCOUNTER
Called and informed patient that Dr. Gil has sent in a referral to [facility] for lymphedema therapy.

## 2025-05-26 ENCOUNTER — HOSPITAL ENCOUNTER (INPATIENT)
Age: 80
LOS: 8 days | Discharge: HOME | DRG: 291 | End: 2025-06-03
Attending: EMERGENCY MEDICINE | Admitting: FAMILY MEDICINE
Payer: MEDICARE

## 2025-05-26 ENCOUNTER — APPOINTMENT (OUTPATIENT)
Dept: GENERAL RADIOLOGY | Age: 80
DRG: 291 | End: 2025-05-26
Payer: MEDICARE

## 2025-05-26 DIAGNOSIS — N10 ACUTE PYELONEPHRITIS: Primary | ICD-10-CM

## 2025-05-26 DIAGNOSIS — I50.33 ACUTE ON CHRONIC DIASTOLIC CONGESTIVE HEART FAILURE (HCC): ICD-10-CM

## 2025-05-26 DIAGNOSIS — J81.0 ACUTE PULMONARY EDEMA (HCC): ICD-10-CM

## 2025-05-26 PROBLEM — Z95.2 HISTORY OF TRANSCATHETER AORTIC VALVE REPLACEMENT (TAVR): Status: ACTIVE | Noted: 2017-06-27

## 2025-05-26 PROBLEM — Z95.2 HISTORY OF TRANSCATHETER AORTIC VALVE REPLACEMENT (TAVR): Chronic | Status: ACTIVE | Noted: 2017-06-27

## 2025-05-26 PROBLEM — N18.32 CHRONIC KIDNEY DISEASE, STAGE 3B (HCC): Chronic | Status: ACTIVE | Noted: 2025-05-26

## 2025-05-26 PROBLEM — J45.909 ASTHMA: Status: ACTIVE | Noted: 2025-05-26

## 2025-05-26 PROBLEM — J45.909 ASTHMA: Chronic | Status: ACTIVE | Noted: 2025-05-26

## 2025-05-26 LAB
ANION GAP SERPL CALC-SCNC: 12 MMOL/L (ref 7–16)
BUN SERPL-MCNC: 30 MG/DL (ref 8–23)
CALCIUM SERPL-MCNC: 8.9 MG/DL (ref 8.8–10.2)
CHLORIDE SERPL-SCNC: 106 MMOL/L (ref 98–107)
CO2 SERPL-SCNC: 23 MMOL/L (ref 20–29)
CREAT SERPL-MCNC: 1.59 MG/DL (ref 0.6–1.1)
ERYTHROCYTE [DISTWIDTH] IN BLOOD BY AUTOMATED COUNT: 17.6 % (ref 11.9–14.6)
GLUCOSE SERPL-MCNC: 103 MG/DL (ref 70–99)
HCT VFR BLD AUTO: 35.2 % (ref 35.8–46.3)
HGB BLD-MCNC: 11.2 G/DL (ref 11.7–15.4)
IRON SATN MFR SERPL: 12 % (ref 20–50)
IRON SERPL-MCNC: 44 UG/DL (ref 35–100)
MCH RBC QN AUTO: 29 PG (ref 26.1–32.9)
MCHC RBC AUTO-ENTMCNC: 31.8 G/DL (ref 31.4–35)
MCV RBC AUTO: 91.2 FL (ref 82–102)
NRBC # BLD: 0 K/UL (ref 0–0.2)
NT PRO BNP: 7339 PG/ML (ref 0–450)
PLATELET # BLD AUTO: 100 K/UL (ref 150–450)
PMV BLD AUTO: 11.6 FL (ref 9.4–12.3)
POTASSIUM SERPL-SCNC: 4 MMOL/L (ref 3.5–5.1)
RBC # BLD AUTO: 3.86 M/UL (ref 4.05–5.2)
SODIUM SERPL-SCNC: 141 MMOL/L (ref 136–145)
TIBC SERPL-MCNC: 356 UG/DL (ref 240–450)
TROPONIN T SERPL HS-MCNC: 76.3 NG/L (ref 0–14)
TROPONIN T SERPL HS-MCNC: 80.3 NG/L (ref 0–14)
TSH W FREE THYROID IF ABNORMAL: 1.99 UIU/ML (ref 0.27–4.2)
UIBC SERPL-MCNC: 312 UG/DL (ref 112–347)
WBC # BLD AUTO: 5.6 K/UL (ref 4.3–11.1)

## 2025-05-26 PROCEDURE — 83550 IRON BINDING TEST: CPT

## 2025-05-26 PROCEDURE — 5A09357 ASSISTANCE WITH RESPIRATORY VENTILATION, LESS THAN 24 CONSECUTIVE HOURS, CONTINUOUS POSITIVE AIRWAY PRESSURE: ICD-10-PCS | Performed by: INTERNAL MEDICINE

## 2025-05-26 PROCEDURE — 6370000000 HC RX 637 (ALT 250 FOR IP): Performed by: FAMILY MEDICINE

## 2025-05-26 PROCEDURE — 6360000002 HC RX W HCPCS: Performed by: FAMILY MEDICINE

## 2025-05-26 PROCEDURE — 6370000000 HC RX 637 (ALT 250 FOR IP): Performed by: INTERNAL MEDICINE

## 2025-05-26 PROCEDURE — 94761 N-INVAS EAR/PLS OXIMETRY MLT: CPT

## 2025-05-26 PROCEDURE — 85027 COMPLETE CBC AUTOMATED: CPT

## 2025-05-26 PROCEDURE — 84443 ASSAY THYROID STIM HORMONE: CPT

## 2025-05-26 PROCEDURE — 99285 EMERGENCY DEPT VISIT HI MDM: CPT

## 2025-05-26 PROCEDURE — 36415 COLL VENOUS BLD VENIPUNCTURE: CPT

## 2025-05-26 PROCEDURE — 84484 ASSAY OF TROPONIN QUANT: CPT

## 2025-05-26 PROCEDURE — 2700000000 HC OXYGEN THERAPY PER DAY

## 2025-05-26 PROCEDURE — 71046 X-RAY EXAM CHEST 2 VIEWS: CPT

## 2025-05-26 PROCEDURE — 96374 THER/PROPH/DIAG INJ IV PUSH: CPT

## 2025-05-26 PROCEDURE — 6360000002 HC RX W HCPCS: Performed by: EMERGENCY MEDICINE

## 2025-05-26 PROCEDURE — 94640 AIRWAY INHALATION TREATMENT: CPT

## 2025-05-26 PROCEDURE — 6370000000 HC RX 637 (ALT 250 FOR IP): Performed by: EMERGENCY MEDICINE

## 2025-05-26 PROCEDURE — 83540 ASSAY OF IRON: CPT

## 2025-05-26 PROCEDURE — 1100000000 HC RM PRIVATE

## 2025-05-26 PROCEDURE — 80048 BASIC METABOLIC PNL TOTAL CA: CPT

## 2025-05-26 PROCEDURE — 2500000003 HC RX 250 WO HCPCS: Performed by: FAMILY MEDICINE

## 2025-05-26 PROCEDURE — 93005 ELECTROCARDIOGRAM TRACING: CPT | Performed by: EMERGENCY MEDICINE

## 2025-05-26 PROCEDURE — 83880 ASSAY OF NATRIURETIC PEPTIDE: CPT

## 2025-05-26 RX ORDER — SODIUM CHLORIDE 0.9 % (FLUSH) 0.9 %
5-40 SYRINGE (ML) INJECTION EVERY 12 HOURS SCHEDULED
Status: DISCONTINUED | OUTPATIENT
Start: 2025-05-26 | End: 2025-06-03 | Stop reason: HOSPADM

## 2025-05-26 RX ORDER — ASPIRIN 81 MG/1
81 TABLET, CHEWABLE ORAL DAILY
Status: DISCONTINUED | OUTPATIENT
Start: 2025-05-27 | End: 2025-06-03 | Stop reason: HOSPADM

## 2025-05-26 RX ORDER — ONDANSETRON 2 MG/ML
4 INJECTION INTRAMUSCULAR; INTRAVENOUS EVERY 6 HOURS PRN
Status: DISCONTINUED | OUTPATIENT
Start: 2025-05-26 | End: 2025-06-03 | Stop reason: HOSPADM

## 2025-05-26 RX ORDER — ONDANSETRON 4 MG/1
4 TABLET, ORALLY DISINTEGRATING ORAL EVERY 6 HOURS PRN
Status: DISCONTINUED | OUTPATIENT
Start: 2025-05-26 | End: 2025-06-03 | Stop reason: HOSPADM

## 2025-05-26 RX ORDER — ACETAMINOPHEN 500 MG
1000 TABLET ORAL
Status: COMPLETED | OUTPATIENT
Start: 2025-05-26 | End: 2025-05-26

## 2025-05-26 RX ORDER — SODIUM CHLORIDE 0.9 % (FLUSH) 0.9 %
5-40 SYRINGE (ML) INJECTION PRN
Status: DISCONTINUED | OUTPATIENT
Start: 2025-05-26 | End: 2025-06-03 | Stop reason: HOSPADM

## 2025-05-26 RX ORDER — HYDROCODONE BITARTRATE AND HOMATROPINE METHYLBROMIDE ORAL SOLUTION 5; 1.5 MG/5ML; MG/5ML
5 LIQUID ORAL EVERY 6 HOURS PRN
Refills: 0 | Status: DISCONTINUED | OUTPATIENT
Start: 2025-05-26 | End: 2025-06-03 | Stop reason: HOSPADM

## 2025-05-26 RX ORDER — BUDESONIDE 0.5 MG/2ML
1 INHALANT ORAL
Status: DISCONTINUED | OUTPATIENT
Start: 2025-05-26 | End: 2025-05-27

## 2025-05-26 RX ORDER — POTASSIUM CHLORIDE 1500 MG/1
40 TABLET, EXTENDED RELEASE ORAL PRN
Status: DISCONTINUED | OUTPATIENT
Start: 2025-05-26 | End: 2025-06-03 | Stop reason: HOSPADM

## 2025-05-26 RX ORDER — POTASSIUM CHLORIDE 7.45 MG/ML
10 INJECTION INTRAVENOUS DAILY PRN
Status: DISCONTINUED | OUTPATIENT
Start: 2025-05-26 | End: 2025-06-03 | Stop reason: HOSPADM

## 2025-05-26 RX ORDER — MAGNESIUM SULFATE IN WATER 40 MG/ML
2000 INJECTION, SOLUTION INTRAVENOUS PRN
Status: DISCONTINUED | OUTPATIENT
Start: 2025-05-26 | End: 2025-06-03 | Stop reason: HOSPADM

## 2025-05-26 RX ORDER — ACETAMINOPHEN 325 MG/1
650 TABLET ORAL EVERY 6 HOURS PRN
Status: DISCONTINUED | OUTPATIENT
Start: 2025-05-26 | End: 2025-06-03 | Stop reason: HOSPADM

## 2025-05-26 RX ORDER — ROSUVASTATIN CALCIUM 20 MG/1
40 TABLET, COATED ORAL EVERY EVENING
Status: DISCONTINUED | OUTPATIENT
Start: 2025-05-26 | End: 2025-05-29

## 2025-05-26 RX ORDER — SPIRONOLACTONE 25 MG/1
25 TABLET ORAL DAILY
Status: DISCONTINUED | OUTPATIENT
Start: 2025-05-27 | End: 2025-06-03 | Stop reason: HOSPADM

## 2025-05-26 RX ORDER — BUMETANIDE 0.25 MG/ML
1 INJECTION, SOLUTION INTRAMUSCULAR; INTRAVENOUS EVERY 8 HOURS
Status: DISCONTINUED | OUTPATIENT
Start: 2025-05-26 | End: 2025-05-26

## 2025-05-26 RX ORDER — PANTOPRAZOLE SODIUM 40 MG/1
40 TABLET, DELAYED RELEASE ORAL
Status: DISCONTINUED | OUTPATIENT
Start: 2025-05-27 | End: 2025-06-03 | Stop reason: HOSPADM

## 2025-05-26 RX ORDER — ARFORMOTEROL TARTRATE 15 UG/2ML
15 SOLUTION RESPIRATORY (INHALATION)
Status: DISCONTINUED | OUTPATIENT
Start: 2025-05-26 | End: 2025-05-27

## 2025-05-26 RX ORDER — MONTELUKAST SODIUM 10 MG/1
10 TABLET ORAL DAILY
Status: DISCONTINUED | OUTPATIENT
Start: 2025-05-27 | End: 2025-06-03 | Stop reason: HOSPADM

## 2025-05-26 RX ORDER — LEVOTHYROXINE SODIUM 75 UG/1
150 TABLET ORAL DAILY
Status: DISCONTINUED | OUTPATIENT
Start: 2025-05-27 | End: 2025-06-03 | Stop reason: HOSPADM

## 2025-05-26 RX ORDER — ONDANSETRON 2 MG/ML
4 INJECTION INTRAMUSCULAR; INTRAVENOUS
Status: COMPLETED | OUTPATIENT
Start: 2025-05-26 | End: 2025-05-26

## 2025-05-26 RX ORDER — FUROSEMIDE 10 MG/ML
80 INJECTION INTRAMUSCULAR; INTRAVENOUS
Status: DISCONTINUED | OUTPATIENT
Start: 2025-05-26 | End: 2025-05-26

## 2025-05-26 RX ORDER — DEXAMETHASONE SODIUM PHOSPHATE 10 MG/ML
10 INJECTION, SOLUTION INTRA-ARTICULAR; INTRALESIONAL; INTRAMUSCULAR; INTRAVENOUS; SOFT TISSUE
Status: DISCONTINUED | OUTPATIENT
Start: 2025-05-26 | End: 2025-05-26

## 2025-05-26 RX ORDER — LEVETIRACETAM 500 MG/1
750 TABLET ORAL 2 TIMES DAILY
Status: DISCONTINUED | OUTPATIENT
Start: 2025-05-26 | End: 2025-06-01

## 2025-05-26 RX ORDER — SODIUM CHLORIDE 9 MG/ML
INJECTION, SOLUTION INTRAVENOUS PRN
Status: DISCONTINUED | OUTPATIENT
Start: 2025-05-26 | End: 2025-06-03 | Stop reason: HOSPADM

## 2025-05-26 RX ORDER — ALBUTEROL SULFATE 0.83 MG/ML
2.5 SOLUTION RESPIRATORY (INHALATION) EVERY 4 HOURS PRN
Status: DISCONTINUED | OUTPATIENT
Start: 2025-05-26 | End: 2025-06-03 | Stop reason: HOSPADM

## 2025-05-26 RX ORDER — ACETAMINOPHEN 650 MG/1
650 SUPPOSITORY RECTAL EVERY 6 HOURS PRN
Status: DISCONTINUED | OUTPATIENT
Start: 2025-05-26 | End: 2025-06-03 | Stop reason: HOSPADM

## 2025-05-26 RX ORDER — BUMETANIDE 0.25 MG/ML
1 INJECTION, SOLUTION INTRAMUSCULAR; INTRAVENOUS 2 TIMES DAILY
Status: COMPLETED | OUTPATIENT
Start: 2025-05-26 | End: 2025-05-27

## 2025-05-26 RX ORDER — SENNOSIDES 8.6 MG/1
1 TABLET ORAL DAILY PRN
Status: DISCONTINUED | OUTPATIENT
Start: 2025-05-26 | End: 2025-06-03 | Stop reason: HOSPADM

## 2025-05-26 RX ORDER — METOLAZONE 5 MG/1
5 TABLET ORAL ONCE
Status: CANCELLED | OUTPATIENT
Start: 2025-05-26 | End: 2025-05-26

## 2025-05-26 RX ORDER — IPRATROPIUM BROMIDE AND ALBUTEROL SULFATE 2.5; .5 MG/3ML; MG/3ML
1 SOLUTION RESPIRATORY (INHALATION)
Status: COMPLETED | OUTPATIENT
Start: 2025-05-26 | End: 2025-05-26

## 2025-05-26 RX ORDER — ENOXAPARIN SODIUM 100 MG/ML
30 INJECTION SUBCUTANEOUS 2 TIMES DAILY
Status: DISCONTINUED | OUTPATIENT
Start: 2025-05-26 | End: 2025-05-28

## 2025-05-26 RX ADMIN — MICONAZOLE NITRATE: 20 POWDER TOPICAL at 21:13

## 2025-05-26 RX ADMIN — HYDROCODONE BITARTRATE AND HOMATROPINE METHYLBROMIDE 5 ML: 1.5; 5 SOLUTION ORAL at 23:18

## 2025-05-26 RX ADMIN — BUMETANIDE 1 MG: 0.25 INJECTION INTRAMUSCULAR; INTRAVENOUS at 18:45

## 2025-05-26 RX ADMIN — ONDANSETRON 4 MG: 2 INJECTION, SOLUTION INTRAMUSCULAR; INTRAVENOUS at 16:19

## 2025-05-26 RX ADMIN — ENOXAPARIN SODIUM 30 MG: 100 INJECTION SUBCUTANEOUS at 21:14

## 2025-05-26 RX ADMIN — ROSUVASTATIN CALCIUM 40 MG: 20 TABLET, FILM COATED ORAL at 21:13

## 2025-05-26 RX ADMIN — SODIUM CHLORIDE, PRESERVATIVE FREE 10 ML: 5 INJECTION INTRAVENOUS at 21:14

## 2025-05-26 RX ADMIN — LEVETIRACETAM 750 MG: 500 TABLET, FILM COATED ORAL at 21:13

## 2025-05-26 RX ADMIN — BUDESONIDE 1000 MCG: 0.5 INHALANT RESPIRATORY (INHALATION) at 20:16

## 2025-05-26 RX ADMIN — ACETAMINOPHEN 1000 MG: 500 TABLET, FILM COATED ORAL at 16:32

## 2025-05-26 RX ADMIN — ARFORMOTEROL TARTRATE 15 MCG: 15 SOLUTION RESPIRATORY (INHALATION) at 20:16

## 2025-05-26 RX ADMIN — IPRATROPIUM BROMIDE AND ALBUTEROL SULFATE 1 DOSE: 2.5; .5 SOLUTION RESPIRATORY (INHALATION) at 14:36

## 2025-05-26 RX ADMIN — IPRATROPIUM BROMIDE 0.5 MG: 0.5 SOLUTION RESPIRATORY (INHALATION) at 20:16

## 2025-05-26 ASSESSMENT — LIFESTYLE VARIABLES
HOW MANY STANDARD DRINKS CONTAINING ALCOHOL DO YOU HAVE ON A TYPICAL DAY: PATIENT DOES NOT DRINK
HOW OFTEN DO YOU HAVE A DRINK CONTAINING ALCOHOL: NEVER

## 2025-05-26 ASSESSMENT — PAIN SCALES - GENERAL: PAINLEVEL_OUTOF10: 6

## 2025-05-26 ASSESSMENT — PAIN - FUNCTIONAL ASSESSMENT: PAIN_FUNCTIONAL_ASSESSMENT: NONE - DENIES PAIN

## 2025-05-26 ASSESSMENT — PAIN DESCRIPTION - LOCATION: LOCATION: BACK

## 2025-05-26 NOTE — ED TRIAGE NOTES
PT BIB EMS from [facility] with c/o SOB.     PT reports increased SOB upon ambulation with dry cough x2 weeks    EMS reports:  150/90  80 HR  126 bgl   100% 4/L NC  Wheezing reports in lower lobes    PT reports hx w/ asthma and using rescue inhaler and nebulizer treatments as needed     Denies any chest pin, tingling or numbness at this time but reports having slight chest pain/discomfort this morning that ceased after a few min.

## 2025-05-26 NOTE — ED NOTES
TRANSFER - OUT REPORT:    Verbal report given to Judy on Krissy Hahn  being transferred to [facility] for routine progression of patient care       Report consisted of patient's Situation, Background, Assessment and   Recommendations(SBAR).     Information from the following report(s) Nurse Handoff Report was reviewed with the receiving nurse.    [place] Fall Assessment:    Presents to emergency department  because of falls (Syncope, seizure, or loss of consciousness): No  Age > 70: Yes  Altered Mental Status, Intoxication with alcohol or substance confusion (Disorientation, impaired judgment, poor safety awaremess, or inability to follow instructions): No  Impaired Mobility: Ambulates or transfers with assistive devices or assistance; Unable to ambulate or transer.: Yes (cane and walker.)             Lines:   Peripheral IV 05/26/25 Right Antecubital (Active)   Site Assessment Clean, dry & intact 05/26/25 1506   Line Status Normal saline locked 05/26/25 1506   Phlebitis Assessment No symptoms 05/26/25 1506   Infiltration Assessment 0 05/26/25 1506   Alcohol Cap Used Yes 05/26/25 1506   Dressing Status Clean, dry & intact 05/26/25 1506   Dressing Type Transparent 05/26/25 1506        Opportunity for questions and clarification was provided.      Patient transported with:  Registered Nurse

## 2025-05-26 NOTE — ED PROVIDER NOTES
Emergency Department Provider Note       E EMERGENCY DEPT   PCP: Elisabeth Cunningham MD   Age: 79 y.o.   Sex: female     DISPOSITION Decision To Admit 05/26/2025 03:59:48 PM    ICD-10-CM    1. Acute on chronic diastolic congestive heart failure (HCC)  I50.33       2. Acute pulmonary edema (HCC)  J81.0           Medical Decision Making     Lung exam challenging.  Will get chest x-ray to evaluate for infiltrate or edema.  DuoNeb and steroids.  Check basic labs.  Reeval after treatment no chest pain suggestive of PE.  Doubt cardiac etiology given extensive history of allergies and asthma     1 or more chronic illnesses with a severe exacerbation or progression.  Drug therapy given requiring intensive monitoring for toxicity.  Shared medical decision making was utilized in creating the patients health plan today.  I independently ordered and reviewed each unique test.    I reviewed external records: provider visit note from outside specialist.  I reviewed external records: previous EKG including cardiologist interpretation.    I reviewed external records: Last echo ef 70, diastolic dysfunction    The patients assessment required an independent historian: Patient's .  The reason they were needed is important historical information not provided by the patient.  ED cardiac monitoring rhythm strip was ordered and interpreted:  sinus rhythm, no evidence of an arrhythmia  ST Segments:Nonspecific ST segments - NO STEMI   Rate: 89  I interpreted the X-rays bilateral increased interstitial markings/pulmonary edema, no focal.  ED provider's independent EKG interpretation EKG shows normal sinus rhythm rate of 88 with a PAC and a left bundle branch block, negative Sgarbossa criteria  The patient was admitted and I have discussed patient management with the admitting provider.  Exclusion criteria - the patient is NOT to be included for SEP-1 Core Measure due to: 2+ SIRS criteria are not met   Critical care procedure note :

## 2025-05-26 NOTE — H&P
Hospitalist History and Physical   Admit Date:  2025  1:33 PM   Name:  Krissy Hahn   Age:  79 y.o.  Sex:  female  :  1945   MRN:  [id number]   Room:      Presenting/Chief Complaint: Shortness of Breath     Reason(s) for Admission: Acute on chronic diastolic congestive heart failure (HCC) [I50.33]     History of Present Illness:   Krissy Hahn is a 79 y.o. female with medical history most significant for chronic diastolic CHF and asthma who presented to [facility] emergency department with complaint of shortness of breath and dry cough of 2 weeks duration.  She reports she is compliant with most of her medications including her Trelegy and albuterol nebs, however she discloses that she will frequently not take her diuretic doses (Lasix 80 twice daily, spironolactone 25 daily) because it makes her void so much and she cannot leave the house.  She also had an albuterol MDI prescribed recently, which she has been using without any improvement in symptoms.  She has chronic bilateral lower extremity lymphedema.  She states she does measure her weight at home, but does not keep track of it.  She states that she drinks large quantities of water as advised by her nephrologist.    Vital signs on presentation were unremarkable.  CMP was pertinent for BUN 30, creatinine 1.5 (baseline 1.3-1.4).  CBC notable for hemoglobin 11.2 with normocytic normochromic indices and platelets of 100K.  BNP 7339.  CXR revealed diffuse interstitial edema.  ER provider ordered DuoNeb treatment, Lasix 80 mg IV x 1 (which I discontinued before administration, as discussed below), transdermal nitroglycerin, IV Zofran, and requested hospitalist admission for further evaluation and management.    Assessment & Plan:     Acute on chronic diastolic congestive heart failure/history of TAVR  Most recent transthoracic echocardiogram was on 2024, showed EF 70-75%, fairly increased LV wall thickness,

## 2025-05-27 ENCOUNTER — APPOINTMENT (OUTPATIENT)
Dept: NON INVASIVE DIAGNOSTICS | Age: 80
DRG: 291 | End: 2025-05-27
Attending: FAMILY MEDICINE
Payer: MEDICARE

## 2025-05-27 LAB
ALBUMIN SERPL-MCNC: 2.5 G/DL (ref 3.2–4.6)
ALBUMIN/GLOB SERPL: 0.6 (ref 1–1.9)
ALP SERPL-CCNC: 88 U/L (ref 35–104)
ALT SERPL-CCNC: 15 U/L (ref 8–45)
ANION GAP SERPL CALC-SCNC: 8 MMOL/L (ref 7–16)
AST SERPL-CCNC: 24 U/L (ref 15–37)
BASOPHILS # BLD: 0.04 K/UL (ref 0–0.2)
BASOPHILS NFR BLD: 0.8 % (ref 0–2)
BILIRUB DIRECT SERPL-MCNC: 0.3 MG/DL (ref 0–0.3)
BILIRUB SERPL-MCNC: 0.6 MG/DL (ref 0–1.2)
BUN SERPL-MCNC: 34 MG/DL (ref 8–23)
CALCIUM SERPL-MCNC: 8.6 MG/DL (ref 8.8–10.2)
CHLORIDE SERPL-SCNC: 111 MMOL/L (ref 98–107)
CHOLEST SERPL-MCNC: 145 MG/DL (ref 0–200)
CO2 SERPL-SCNC: 21 MMOL/L (ref 20–29)
CREAT SERPL-MCNC: 1.7 MG/DL (ref 0.6–1.1)
DIFFERENTIAL METHOD BLD: ABNORMAL
ECHO AO ASC DIAM: 3.1 CM
ECHO AO ASCENDING AORTA INDEX: 1.43 CM/M2
ECHO AO ROOT DIAM: 2.4 CM
ECHO AO ROOT INDEX: 1.11 CM/M2
ECHO AR MAX VEL PISA: 4 M/S
ECHO AV AREA PEAK VELOCITY: 1.6 CM2
ECHO AV AREA VTI: 1.5 CM2
ECHO AV AREA/BSA PEAK VELOCITY: 0.7 CM2/M2
ECHO AV AREA/BSA VTI: 0.7 CM2/M2
ECHO AV CUSP MM: 0.9 CM
ECHO AV MEAN GRADIENT: 45 MMHG
ECHO AV MEAN VELOCITY: 3.2 M/S
ECHO AV PEAK GRADIENT: 75 MMHG
ECHO AV PEAK VELOCITY: 4.3 M/S
ECHO AV REGURGITANT PHT: 185 MS
ECHO AV VELOCITY RATIO: 0.51
ECHO AV VTI: 106 CM
ECHO BSA: 2.34 M2
ECHO IVC EXP: 2.6 CM
ECHO LA AREA 4C: 33.8 CM2
ECHO LA DIAMETER INDEX: 2.4 CM/M2
ECHO LA DIAMETER: 5.2 CM
ECHO LA MAJOR AXIS: 7.4 CM
ECHO LA TO AORTIC ROOT RATIO: 2.17
ECHO LA VOL MOD A4C: 120 ML (ref 22–52)
ECHO LA VOLUME INDEX MOD A4C: 55 ML/M2 (ref 16–34)
ECHO LV E' LATERAL VELOCITY: 9.83 CM/S
ECHO LV E' SEPTAL VELOCITY: 4.26 CM/S
ECHO LV EDV A2C: 167 ML
ECHO LV EDV A4C: 189 ML
ECHO LV EDV INDEX A4C: 87 ML/M2
ECHO LV EDV NDEX A2C: 77 ML/M2
ECHO LV EF PHYSICIAN: 45 %
ECHO LV EJECTION FRACTION A2C: 29 %
ECHO LV EJECTION FRACTION A4C: 25 %
ECHO LV ESV A2C: 119 ML
ECHO LV ESV A4C: 141 ML
ECHO LV ESV INDEX A2C: 55 ML/M2
ECHO LV ESV INDEX A4C: 65 ML/M2
ECHO LV FRACTIONAL SHORTENING: 11 % (ref 28–44)
ECHO LV INTERNAL DIMENSION DIASTOLE INDEX: 2.17 CM/M2
ECHO LV INTERNAL DIMENSION DIASTOLIC: 4.7 CM (ref 3.9–5.3)
ECHO LV INTERNAL DIMENSION SYSTOLIC INDEX: 1.94 CM/M2
ECHO LV INTERNAL DIMENSION SYSTOLIC: 4.2 CM
ECHO LV IVSD: 0.9 CM (ref 0.6–0.9)
ECHO LV MASS 2D: 153.4 G (ref 67–162)
ECHO LV MASS INDEX 2D: 70.7 G/M2 (ref 43–95)
ECHO LV POSTERIOR WALL DIASTOLIC: 1 CM (ref 0.6–0.9)
ECHO LV RELATIVE WALL THICKNESS RATIO: 0.43
ECHO LVOT AREA: 3.1 CM2
ECHO LVOT AV VTI INDEX: 0.47
ECHO LVOT DIAM: 2 CM
ECHO LVOT MEAN GRADIENT: 9 MMHG
ECHO LVOT PEAK GRADIENT: 19 MMHG
ECHO LVOT PEAK VELOCITY: 2.2 M/S
ECHO LVOT STROKE VOLUME INDEX: 71.8 ML/M2
ECHO LVOT SV: 155.7 ML
ECHO LVOT VTI: 49.6 CM
ECHO MV REGURGITANT PEAK GRADIENT: 149 MMHG
ECHO MV REGURGITANT PEAK VELOCITY: 6.1 M/S
ECHO MV REGURGITANT RADIUS PISA: 0.8 CM
ECHO MV REGURGITANT VTIA: 169 CM
ECHO PULMONARY ARTERY END DIASTOLIC PRESSURE: 8 MMHG
ECHO PV ACCELERATION TIME (AT): 129 MS
ECHO PV MAX VELOCITY: 1 M/S
ECHO PV PEAK GRADIENT: 4 MMHG
ECHO PV REGURGITANT MAX VELOCITY: 1.4 M/S
ECHO RA AREA 4C: 15 CM2
ECHO RV BASAL DIMENSION: 4.6 CM
ECHO RV FREE WALL PEAK S': 18.9 CM/S
ECHO RV INTERNAL DIMENSION: 4.9 CM
ECHO RV MID DIMENSION: 3.3 CM
ECHO RV TAPSE: 2.1 CM (ref 1.7–?)
ECHO RVOT MEAN GRADIENT: 1 MMHG
ECHO RVOT PEAK GRADIENT: 3 MMHG
ECHO RVOT PEAK VELOCITY: 0.8 M/S
ECHO RVOT VTI: 22.3 CM
ECHO TV REGURGITANT MAX VELOCITY: 3.9 M/S
ECHO TV REGURGITANT PEAK GRADIENT: 61 MMHG
EKG ATRIAL RATE: 88 BPM
EKG DIAGNOSIS: NORMAL
EKG P AXIS: 72 DEGREES
EKG P-R INTERVAL: 162 MS
EKG Q-T INTERVAL: 441 MS
EKG QRS DURATION: 155 MS
EKG QTC CALCULATION (BAZETT): 534 MS
EKG R AXIS: 32 DEGREES
EKG T AXIS: 240 DEGREES
EKG VENTRICULAR RATE: 88 BPM
EOSINOPHIL # BLD: 0.17 K/UL (ref 0–0.8)
EOSINOPHIL NFR BLD: 3.5 % (ref 0.5–7.8)
ERYTHROCYTE [DISTWIDTH] IN BLOOD BY AUTOMATED COUNT: 17.4 % (ref 11.9–14.6)
GLOBULIN SER CALC-MCNC: 4.2 G/DL (ref 2.3–3.5)
GLUCOSE SERPL-MCNC: 90 MG/DL (ref 70–99)
HCT VFR BLD AUTO: 32.3 % (ref 35.8–46.3)
HDLC SERPL-MCNC: 52 MG/DL (ref 40–60)
HDLC SERPL: 2.8 (ref 0–5)
HGB BLD-MCNC: 10 G/DL (ref 11.7–15.4)
IMM GRANULOCYTES # BLD AUTO: 0.01 K/UL (ref 0–0.5)
IMM GRANULOCYTES NFR BLD AUTO: 0.2 % (ref 0–5)
LDLC SERPL CALC-MCNC: 79 MG/DL (ref 0–100)
LYMPHOCYTES # BLD: 0.69 K/UL (ref 0.5–4.6)
LYMPHOCYTES NFR BLD: 14.3 % (ref 13–44)
MAGNESIUM SERPL-MCNC: 2 MG/DL (ref 1.8–2.4)
MCH RBC QN AUTO: 28.9 PG (ref 26.1–32.9)
MCHC RBC AUTO-ENTMCNC: 31 G/DL (ref 31.4–35)
MCV RBC AUTO: 93.4 FL (ref 82–102)
MONOCYTES # BLD: 0.54 K/UL (ref 0.1–1.3)
MONOCYTES NFR BLD: 11.2 % (ref 4–12)
NEUTS SEG # BLD: 3.36 K/UL (ref 1.7–8.2)
NEUTS SEG NFR BLD: 70 % (ref 43–78)
NRBC # BLD: 0 K/UL (ref 0–0.2)
PLATELET # BLD AUTO: 98 K/UL (ref 150–450)
PMV BLD AUTO: 12.7 FL (ref 9.4–12.3)
POTASSIUM SERPL-SCNC: 4.6 MMOL/L (ref 3.5–5.1)
PROT SERPL-MCNC: 6.7 G/DL (ref 6.3–8.2)
RBC # BLD AUTO: 3.46 M/UL (ref 4.05–5.2)
SODIUM SERPL-SCNC: 140 MMOL/L (ref 136–145)
TRIGL SERPL-MCNC: 68 MG/DL (ref 0–150)
VLDLC SERPL CALC-MCNC: 14 MG/DL (ref 6–23)
WBC # BLD AUTO: 4.8 K/UL (ref 4.3–11.1)

## 2025-05-27 PROCEDURE — 6370000000 HC RX 637 (ALT 250 FOR IP): Performed by: INTERNAL MEDICINE

## 2025-05-27 PROCEDURE — 99223 1ST HOSP IP/OBS HIGH 75: CPT | Performed by: INTERNAL MEDICINE

## 2025-05-27 PROCEDURE — 93306 TTE W/DOPPLER COMPLETE: CPT | Performed by: INTERNAL MEDICINE

## 2025-05-27 PROCEDURE — 80076 HEPATIC FUNCTION PANEL: CPT

## 2025-05-27 PROCEDURE — 6360000002 HC RX W HCPCS: Performed by: INTERNAL MEDICINE

## 2025-05-27 PROCEDURE — 1100000003 HC PRIVATE W/ TELEMETRY

## 2025-05-27 PROCEDURE — 36415 COLL VENOUS BLD VENIPUNCTURE: CPT

## 2025-05-27 PROCEDURE — 83735 ASSAY OF MAGNESIUM: CPT

## 2025-05-27 PROCEDURE — 6360000004 HC RX CONTRAST MEDICATION: Performed by: FAMILY MEDICINE

## 2025-05-27 PROCEDURE — 6370000000 HC RX 637 (ALT 250 FOR IP): Performed by: FAMILY MEDICINE

## 2025-05-27 PROCEDURE — 6360000002 HC RX W HCPCS: Performed by: FAMILY MEDICINE

## 2025-05-27 PROCEDURE — 85025 COMPLETE CBC W/AUTO DIFF WBC: CPT

## 2025-05-27 PROCEDURE — 97165 OT EVAL LOW COMPLEX 30 MIN: CPT

## 2025-05-27 PROCEDURE — 97530 THERAPEUTIC ACTIVITIES: CPT

## 2025-05-27 PROCEDURE — 92610 EVALUATE SWALLOWING FUNCTION: CPT

## 2025-05-27 PROCEDURE — 97535 SELF CARE MNGMENT TRAINING: CPT

## 2025-05-27 PROCEDURE — 93010 ELECTROCARDIOGRAM REPORT: CPT | Performed by: INTERNAL MEDICINE

## 2025-05-27 PROCEDURE — 80048 BASIC METABOLIC PNL TOTAL CA: CPT

## 2025-05-27 PROCEDURE — 2700000000 HC OXYGEN THERAPY PER DAY

## 2025-05-27 PROCEDURE — C8929 TTE W OR WO FOL WCON,DOPPLER: HCPCS

## 2025-05-27 PROCEDURE — 80061 LIPID PANEL: CPT

## 2025-05-27 PROCEDURE — 97161 PT EVAL LOW COMPLEX 20 MIN: CPT

## 2025-05-27 PROCEDURE — 94761 N-INVAS EAR/PLS OXIMETRY MLT: CPT

## 2025-05-27 PROCEDURE — 94640 AIRWAY INHALATION TREATMENT: CPT

## 2025-05-27 PROCEDURE — 2500000003 HC RX 250 WO HCPCS: Performed by: FAMILY MEDICINE

## 2025-05-27 RX ORDER — BUMETANIDE 1 MG/1
2 TABLET ORAL DAILY
Status: DISCONTINUED | OUTPATIENT
Start: 2025-05-28 | End: 2025-05-28

## 2025-05-27 RX ORDER — BUDESONIDE 0.5 MG/2ML
0.5 INHALANT ORAL
Status: DISCONTINUED | OUTPATIENT
Start: 2025-05-27 | End: 2025-05-29

## 2025-05-27 RX ORDER — ARFORMOTEROL TARTRATE 15 UG/2ML
15 SOLUTION RESPIRATORY (INHALATION)
Status: DISCONTINUED | OUTPATIENT
Start: 2025-05-27 | End: 2025-05-29

## 2025-05-27 RX ADMIN — HYDROCODONE BITARTRATE AND HOMATROPINE METHYLBROMIDE 5 ML: 1.5; 5 SOLUTION ORAL at 19:20

## 2025-05-27 RX ADMIN — IPRATROPIUM BROMIDE 0.5 MG: 0.5 SOLUTION RESPIRATORY (INHALATION) at 20:36

## 2025-05-27 RX ADMIN — SODIUM CHLORIDE, PRESERVATIVE FREE 10 ML: 5 INJECTION INTRAVENOUS at 21:32

## 2025-05-27 RX ADMIN — IPRATROPIUM BROMIDE 0.5 MG: 0.5 SOLUTION RESPIRATORY (INHALATION) at 07:35

## 2025-05-27 RX ADMIN — PANTOPRAZOLE SODIUM 40 MG: 40 TABLET, DELAYED RELEASE ORAL at 05:49

## 2025-05-27 RX ADMIN — ARFORMOTEROL TARTRATE 15 MCG: 15 SOLUTION RESPIRATORY (INHALATION) at 20:36

## 2025-05-27 RX ADMIN — SPIRONOLACTONE 25 MG: 25 TABLET ORAL at 09:43

## 2025-05-27 RX ADMIN — BUMETANIDE 1 MG: 0.25 INJECTION INTRAMUSCULAR; INTRAVENOUS at 09:45

## 2025-05-27 RX ADMIN — ENOXAPARIN SODIUM 30 MG: 100 INJECTION SUBCUTANEOUS at 09:43

## 2025-05-27 RX ADMIN — BUMETANIDE 1 MG: 0.25 INJECTION INTRAMUSCULAR; INTRAVENOUS at 17:51

## 2025-05-27 RX ADMIN — BUDESONIDE 500 MCG: 0.5 INHALANT RESPIRATORY (INHALATION) at 07:33

## 2025-05-27 RX ADMIN — SODIUM CHLORIDE, PRESERVATIVE FREE 10 ML: 5 INJECTION INTRAVENOUS at 09:45

## 2025-05-27 RX ADMIN — MICONAZOLE NITRATE: 20 POWDER TOPICAL at 09:46

## 2025-05-27 RX ADMIN — ENOXAPARIN SODIUM 30 MG: 100 INJECTION SUBCUTANEOUS at 21:31

## 2025-05-27 RX ADMIN — ASPIRIN 81 MG: 81 TABLET, CHEWABLE ORAL at 09:41

## 2025-05-27 RX ADMIN — LEVETIRACETAM 750 MG: 500 TABLET, FILM COATED ORAL at 21:32

## 2025-05-27 RX ADMIN — LEVETIRACETAM 750 MG: 500 TABLET, FILM COATED ORAL at 09:42

## 2025-05-27 RX ADMIN — LEVOTHYROXINE SODIUM 150 MCG: 0.07 TABLET ORAL at 05:49

## 2025-05-27 RX ADMIN — ROSUVASTATIN CALCIUM 40 MG: 20 TABLET, FILM COATED ORAL at 17:50

## 2025-05-27 RX ADMIN — MICONAZOLE NITRATE: 20 POWDER TOPICAL at 21:32

## 2025-05-27 RX ADMIN — MONTELUKAST 10 MG: 10 TABLET, FILM COATED ORAL at 12:10

## 2025-05-27 RX ADMIN — BUDESONIDE 500 MCG: 0.5 INHALANT RESPIRATORY (INHALATION) at 20:36

## 2025-05-27 RX ADMIN — SULFUR HEXAFLUORIDE 2 ML: KIT at 09:29

## 2025-05-27 RX ADMIN — ARFORMOTEROL TARTRATE 15 MCG: 15 SOLUTION RESPIRATORY (INHALATION) at 07:33

## 2025-05-27 RX ADMIN — IPRATROPIUM BROMIDE 0.5 MG: 0.5 SOLUTION RESPIRATORY (INHALATION) at 14:15

## 2025-05-27 ASSESSMENT — PAIN DESCRIPTION - PAIN TYPE: TYPE: ACUTE PAIN;CHRONIC PAIN

## 2025-05-27 ASSESSMENT — PAIN DESCRIPTION - DESCRIPTORS: DESCRIPTORS: ACHING

## 2025-05-27 ASSESSMENT — PAIN DESCRIPTION - ORIENTATION: ORIENTATION: RIGHT;LEFT

## 2025-05-27 ASSESSMENT — PAIN DESCRIPTION - LOCATION: LOCATION: LEG

## 2025-05-27 NOTE — CARE COORDINATION
STR recommended, choice list given to patient and  to review. Wheelchair ordered through [facility].     Marizol Dunn RN, BSN  [place] Care Manager

## 2025-05-27 NOTE — CONSULTS
[facility] CARDIOLOGY PROGRESS NOTE           5/27/2025 11:35 AM    Admit Date: 5/26/2025  Requesting Provider: Steven Cho MD  Primary Cardiologist: WALDEMAR DC MD  Consulting Cardiologist: WALDEMAR DC MD        Subjective:   HPI:    Patient well known to me, admitted yesterday with acute/chronic HFpEF exacerbation, possibly in part d/t medication non adherence d/t frequent urination, although her EF has dropped compared with prior study, and AV gradient has slowly climbed.   Hx of prior TAVR, morbid obesity, lymphedema.  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 Serum albumin is low at 2.5.  she recently started using protein supplements at home for this.  She also endorses she was not taking her spironolactone on a daily basis. She is feeling better following a 6 lb diuresis overnight.  I personally reviewed her echo. While it is a TDS, I agree with the reading of low normal/mildly depressed EF, possible prosthetic AV stenosis, and moderate MR with elevated RVSP.  There is also dilated IVC confirming elevated filling pressures.        Intake/Output Summary (Last 24 hours) at 5/27/2025 1139  Last data filed at 5/27/2025 0554  Gross per 24 hour   Intake 10 ml   Output 400 ml   Net -390 ml                  Past cardiac history:   mild (to moderate) but mean gradient only 15, FANNY 1.3, current symptoms not attributable to this   11/9/12: moderate, peak gradient 42, mean 23.    Dec 2014 - moderate - mean 33, peak 50, FANNY 0.9, EF 57%   Feb 2017 - EF 60-65%, severe AS, mean gradient 42, peak 65, FANNY 0.76 sq cm, mild AI, mild MR   Jun 27 2017 - Transcatheter aortic valve replacement (23 mm White Aye 3 transcatheter aortic valve via right common

## 2025-05-27 NOTE — PLAN OF CARE
Problem: Chronic Conditions and Co-morbidities  Goal: Patient's chronic conditions and co-morbidity symptoms are monitored and maintained or improved  5/27/2025 1051 by Judy Milligan RN  Outcome: Progressing  Flowsheets (Taken 5/27/2025 0725)  Care Plan - Patient's Chronic Conditions and Co-Morbidity Symptoms are Monitored and Maintained or Improved:   Monitor and assess patient's chronic conditions and comorbid symptoms for stability, deterioration, or improvement   Collaborate with multidisciplinary team to address chronic and comorbid conditions and prevent exacerbation or deterioration   Update acute care plan with appropriate goals if chronic or comorbid symptoms are exacerbated and prevent overall improvement and discharge  5/26/2025 2149 by Ann Rojas RN  Outcome: Progressing  Flowsheets  Taken 5/26/2025 2000 by Ann Rojas RN  Care Plan - Patient's Chronic Conditions and Co-Morbidity Symptoms are Monitored and Maintained or Improved:   Monitor and assess patient's chronic conditions and comorbid symptoms for stability, deterioration, or improvement   Collaborate with multidisciplinary team to address chronic and comorbid conditions and prevent exacerbation or deterioration   Update acute care plan with appropriate goals if chronic or comorbid symptoms are exacerbated and prevent overall improvement and discharge  Taken 5/26/2025 1737 by Judy Milligan RN  Care Plan - Patient's Chronic Conditions and Co-Morbidity Symptoms are Monitored and Maintained or Improved:   Monitor and assess patient's chronic conditions and comorbid symptoms for stability, deterioration, or improvement   Collaborate with multidisciplinary team to address chronic and comorbid conditions and prevent exacerbation or deterioration   Update acute care plan with appropriate goals if chronic or comorbid symptoms are exacerbated and prevent overall improvement and discharge     Problem: Discharge Planning  Goal:

## 2025-05-27 NOTE — CARE COORDINATION
Discharge plan is unknown at present. Patient lives with  on whom she is becoming increasingly more dependent. She needs assistance with ADL's such as getting dressed,bathing, cooking, and cleaning.  is feeling overwhelmed and is dealing with his own medical issues. Patient has rollator and cane at home, but states that she could really use a wheelchair. Will watch for PT/OT recommendations for possible H/H vs STR needs. Demographics and PCP confirmed. CM will continue to follow.    Marizol Dunn RN, BSN  [place] Care Manager        05/27/25 7000   Service Assessment   Patient Orientation Alert and Oriented   Cognition Alert   History Provided By Patient   Primary Caregiver Spouse   Support Systems Spouse/Significant Other   Patient's Healthcare Decision Maker is: Legal Next of Kin   PCP Verified by CM Yes   Prior Functional Level Bathing;Dressing;Housework;Cooking;Mobility;Shopping   Current Functional Level Assistance with the following:;Bathing;Dressing;Toileting;Housework;Mobility   Can patient return to prior living arrangement Unknown at present   Ability to make needs known: Good   Family able to assist with home care needs: Yes   Would you like for me to discuss the discharge plan with any other family members/significant others, and if so, who? Yes  ()   Financial Resources Medicare   Community Resources None   Social/Functional History   Lives With Spouse   Type of Home House   Bathroom Equipment Grab bars around toilet   Home Equipment Rollator;Cane;Grab bars   Receives Help From Family   Prior Level of Assist for ADLs Needs assistance   Bath Moderate assistance   Dressing Moderate assistance   Grooming Moderate assistance   Feeding Independent   Toileting Needs assistance   Ambulation Assistance Needs assistance   Prior Level of Assist for Transfers Needs assistance   Active  No   Patient's  Info  provides transportation   Mode of Transportation Car   Discharge

## 2025-05-28 LAB
ANION GAP SERPL CALC-SCNC: 10 MMOL/L (ref 7–16)
BASOPHILS # BLD: 0.03 K/UL (ref 0–0.2)
BASOPHILS NFR BLD: 0.7 % (ref 0–2)
BUN SERPL-MCNC: 38 MG/DL (ref 8–23)
CALCIUM SERPL-MCNC: 8.7 MG/DL (ref 8.8–10.2)
CHLORIDE SERPL-SCNC: 106 MMOL/L (ref 98–107)
CO2 SERPL-SCNC: 22 MMOL/L (ref 20–29)
CREAT SERPL-MCNC: 2 MG/DL (ref 0.6–1.1)
DIFFERENTIAL METHOD BLD: ABNORMAL
EOSINOPHIL # BLD: 0.26 K/UL (ref 0–0.8)
EOSINOPHIL NFR BLD: 5.8 % (ref 0.5–7.8)
ERYTHROCYTE [DISTWIDTH] IN BLOOD BY AUTOMATED COUNT: 17.3 % (ref 11.9–14.6)
GLUCOSE SERPL-MCNC: 92 MG/DL (ref 70–99)
HCT VFR BLD AUTO: 32.8 % (ref 35.8–46.3)
HGB BLD-MCNC: 10.4 G/DL (ref 11.7–15.4)
IMM GRANULOCYTES # BLD AUTO: 0.01 K/UL (ref 0–0.5)
IMM GRANULOCYTES NFR BLD AUTO: 0.2 % (ref 0–5)
LYMPHOCYTES # BLD: 0.87 K/UL (ref 0.5–4.6)
LYMPHOCYTES NFR BLD: 19.2 % (ref 13–44)
MAGNESIUM SERPL-MCNC: 1.9 MG/DL (ref 1.8–2.4)
MCH RBC QN AUTO: 29.6 PG (ref 26.1–32.9)
MCHC RBC AUTO-ENTMCNC: 31.7 G/DL (ref 31.4–35)
MCV RBC AUTO: 93.4 FL (ref 82–102)
MONOCYTES # BLD: 0.54 K/UL (ref 0.1–1.3)
MONOCYTES NFR BLD: 11.9 % (ref 4–12)
NEUTS SEG # BLD: 2.81 K/UL (ref 1.7–8.2)
NEUTS SEG NFR BLD: 62.2 % (ref 43–78)
NRBC # BLD: 0 K/UL (ref 0–0.2)
NT PRO BNP: 8543 PG/ML (ref 0–450)
PLATELET # BLD AUTO: 99 K/UL (ref 150–450)
PMV BLD AUTO: 12.4 FL (ref 9.4–12.3)
POTASSIUM SERPL-SCNC: 4.3 MMOL/L (ref 3.5–5.1)
RBC # BLD AUTO: 3.51 M/UL (ref 4.05–5.2)
SODIUM SERPL-SCNC: 138 MMOL/L (ref 136–145)
WBC # BLD AUTO: 4.5 K/UL (ref 4.3–11.1)

## 2025-05-28 PROCEDURE — 94761 N-INVAS EAR/PLS OXIMETRY MLT: CPT

## 2025-05-28 PROCEDURE — 6370000000 HC RX 637 (ALT 250 FOR IP): Performed by: INTERNAL MEDICINE

## 2025-05-28 PROCEDURE — 99232 SBSQ HOSP IP/OBS MODERATE 35: CPT | Performed by: INTERNAL MEDICINE

## 2025-05-28 PROCEDURE — 94664 DEMO&/EVAL PT USE INHALER: CPT

## 2025-05-28 PROCEDURE — 6360000002 HC RX W HCPCS: Performed by: FAMILY MEDICINE

## 2025-05-28 PROCEDURE — 85025 COMPLETE CBC W/AUTO DIFF WBC: CPT

## 2025-05-28 PROCEDURE — 97530 THERAPEUTIC ACTIVITIES: CPT

## 2025-05-28 PROCEDURE — 1100000000 HC RM PRIVATE

## 2025-05-28 PROCEDURE — 6360000002 HC RX W HCPCS: Performed by: INTERNAL MEDICINE

## 2025-05-28 PROCEDURE — 36415 COLL VENOUS BLD VENIPUNCTURE: CPT

## 2025-05-28 PROCEDURE — 83735 ASSAY OF MAGNESIUM: CPT

## 2025-05-28 PROCEDURE — 94640 AIRWAY INHALATION TREATMENT: CPT

## 2025-05-28 PROCEDURE — 83880 ASSAY OF NATRIURETIC PEPTIDE: CPT

## 2025-05-28 PROCEDURE — 6370000000 HC RX 637 (ALT 250 FOR IP): Performed by: FAMILY MEDICINE

## 2025-05-28 PROCEDURE — 94660 CPAP INITIATION&MGMT: CPT

## 2025-05-28 PROCEDURE — 2500000003 HC RX 250 WO HCPCS: Performed by: FAMILY MEDICINE

## 2025-05-28 PROCEDURE — 80048 BASIC METABOLIC PNL TOTAL CA: CPT

## 2025-05-28 RX ORDER — ENOXAPARIN SODIUM 100 MG/ML
30 INJECTION SUBCUTANEOUS DAILY
Status: DISCONTINUED | OUTPATIENT
Start: 2025-05-29 | End: 2025-06-03 | Stop reason: HOSPADM

## 2025-05-28 RX ORDER — BUMETANIDE 1 MG/1
1 TABLET ORAL 2 TIMES DAILY
Status: DISCONTINUED | OUTPATIENT
Start: 2025-05-28 | End: 2025-06-03 | Stop reason: HOSPADM

## 2025-05-28 RX ADMIN — MICONAZOLE NITRATE: 20 POWDER TOPICAL at 09:42

## 2025-05-28 RX ADMIN — IPRATROPIUM BROMIDE 0.5 MG: 0.5 SOLUTION RESPIRATORY (INHALATION) at 20:49

## 2025-05-28 RX ADMIN — ARFORMOTEROL TARTRATE 15 MCG: 15 SOLUTION RESPIRATORY (INHALATION) at 08:26

## 2025-05-28 RX ADMIN — ARFORMOTEROL TARTRATE 15 MCG: 15 SOLUTION RESPIRATORY (INHALATION) at 20:49

## 2025-05-28 RX ADMIN — ENOXAPARIN SODIUM 30 MG: 100 INJECTION SUBCUTANEOUS at 09:30

## 2025-05-28 RX ADMIN — ROSUVASTATIN CALCIUM 40 MG: 20 TABLET, FILM COATED ORAL at 17:59

## 2025-05-28 RX ADMIN — LEVETIRACETAM 750 MG: 500 TABLET, FILM COATED ORAL at 21:10

## 2025-05-28 RX ADMIN — SODIUM CHLORIDE, PRESERVATIVE FREE 10 ML: 5 INJECTION INTRAVENOUS at 21:11

## 2025-05-28 RX ADMIN — BUDESONIDE 500 MCG: 0.5 INHALANT RESPIRATORY (INHALATION) at 20:49

## 2025-05-28 RX ADMIN — IPRATROPIUM BROMIDE 0.5 MG: 0.5 SOLUTION RESPIRATORY (INHALATION) at 08:26

## 2025-05-28 RX ADMIN — PANTOPRAZOLE SODIUM 40 MG: 40 TABLET, DELAYED RELEASE ORAL at 06:11

## 2025-05-28 RX ADMIN — LEVOTHYROXINE SODIUM 150 MCG: 0.07 TABLET ORAL at 06:11

## 2025-05-28 RX ADMIN — BUMETANIDE 2 MG: 1 TABLET ORAL at 09:26

## 2025-05-28 RX ADMIN — LEVETIRACETAM 750 MG: 500 TABLET, FILM COATED ORAL at 09:26

## 2025-05-28 RX ADMIN — HYDROCODONE BITARTRATE AND HOMATROPINE METHYLBROMIDE 5 ML: 1.5; 5 SOLUTION ORAL at 18:02

## 2025-05-28 RX ADMIN — ACETAMINOPHEN 650 MG: 325 TABLET, FILM COATED ORAL at 09:40

## 2025-05-28 RX ADMIN — BUMETANIDE 1 MG: 1 TABLET ORAL at 17:59

## 2025-05-28 RX ADMIN — SPIRONOLACTONE 25 MG: 25 TABLET ORAL at 09:27

## 2025-05-28 RX ADMIN — BUDESONIDE 500 MCG: 0.5 INHALANT RESPIRATORY (INHALATION) at 08:26

## 2025-05-28 RX ADMIN — MICONAZOLE NITRATE: 20 POWDER TOPICAL at 21:11

## 2025-05-28 RX ADMIN — HYDROCODONE BITARTRATE AND HOMATROPINE METHYLBROMIDE 5 ML: 1.5; 5 SOLUTION ORAL at 23:47

## 2025-05-28 RX ADMIN — MONTELUKAST 10 MG: 10 TABLET, FILM COATED ORAL at 09:26

## 2025-05-28 RX ADMIN — IPRATROPIUM BROMIDE 0.5 MG: 0.5 SOLUTION RESPIRATORY (INHALATION) at 14:48

## 2025-05-28 RX ADMIN — ASPIRIN 81 MG: 81 TABLET, CHEWABLE ORAL at 09:26

## 2025-05-28 ASSESSMENT — PAIN SCALES - GENERAL: PAINLEVEL_OUTOF10: 0

## 2025-05-28 NOTE — CARE COORDINATION
Sw reviewed chart and met with pt and .  had made choices of [facility], [facility], and [facility]. Informed them Sw would place referrals and wait to obtain a bed offer. Then insurance would have to approve. Sw talked with couple about option of going home as well. Discussed HHC services of PT, OT, Aide. Want to try rehab first and then participate in HHC when ready for discharge from that level of care. Sw will cont to follow and assist.   Phylicia Lin/FORREST

## 2025-05-28 NOTE — PLAN OF CARE
Problem: Chronic Conditions and Co-morbidities  Goal: Patient's chronic conditions and co-morbidity symptoms are monitored and maintained or improved  5/27/2025 2251 by Sarah Leger RN  Outcome: Progressing  Flowsheets (Taken 5/27/2025 1930)  Care Plan - Patient's Chronic Conditions and Co-Morbidity Symptoms are Monitored and Maintained or Improved: Monitor and assess patient's chronic conditions and comorbid symptoms for stability, deterioration, or improvement  5/27/2025 1051 by Judy Milligan RN  Outcome: Progressing  Flowsheets (Taken 5/27/2025 0725)  Care Plan - Patient's Chronic Conditions and Co-Morbidity Symptoms are Monitored and Maintained or Improved:   Monitor and assess patient's chronic conditions and comorbid symptoms for stability, deterioration, or improvement   Collaborate with multidisciplinary team to address chronic and comorbid conditions and prevent exacerbation or deterioration   Update acute care plan with appropriate goals if chronic or comorbid symptoms are exacerbated and prevent overall improvement and discharge     Problem: Discharge Planning  Goal: Discharge to home or other facility with appropriate resources  5/27/2025 2251 by Sarah Leger RN  Outcome: Progressing  Flowsheets (Taken 5/27/2025 1930)  Discharge to home or other facility with appropriate resources: Identify barriers to discharge with patient and caregiver  5/27/2025 1051 by Judy Milligan RN  Outcome: Progressing  Flowsheets (Taken 5/27/2025 0725)  Discharge to home or other facility with appropriate resources:   Identify barriers to discharge with patient and caregiver   Arrange for needed discharge resources and transportation as appropriate   Refer to discharge planning if patient needs post-hospital services based on physician order or complex needs related to functional status, cognitive ability or social support system     Problem: Skin/Tissue Integrity  Goal: Skin integrity remains

## 2025-05-28 NOTE — NURSE NAVIGATOR
This RN Navigator at bedside, introduced to patient and spouse.  Patient informed that this RN Navigator will be following them at least 30 days post discharge to act as a resource for any needs that may arise in relation to their COPD diagnosis and treatment, despite being hospitalized for a different reason.     Patient states little understanding of COPD disease process.  Patient states she has asthma, but did not realize she has been diagnosed with COPD. COPD action plan discussed.  Patient states understanding. Discussed triggers, precautions, managing stress and breathing techniques with patient.       Patient verbalized understanding of importance of COPD zone tool, handwashing, PCP follow up within 7 days of hospital discharge.     COPD educational booklet given to patient along with this RN Navigator's card.  Patient informed that this RN Navigator will be contacting them within 48-72 hours of discharge. Contact information verified by patient.      Patient denies wearing oxygen at home. She states she used to wear CPAP @ night but has not worn in a while. She denies ever being a smoker. She reports using inhalers and a nebulizer at home. She is [facility] pulmonary at [facility]. She lives with her spouse and there are no pets in the home. Patient is not an active . She states her spouse drives her to her appointments and to obtain medications. Patient gives this RN Navigator permission to speak with spouse, Jaimelissett, if unable to reach her by phone after discharge.     This RN Navigator will continue to follow.

## 2025-05-29 LAB
ANION GAP SERPL CALC-SCNC: 12 MMOL/L (ref 7–16)
BASOPHILS # BLD: 0.04 K/UL (ref 0–0.2)
BASOPHILS NFR BLD: 0.7 % (ref 0–2)
BUN SERPL-MCNC: 43 MG/DL (ref 8–23)
CALCIUM SERPL-MCNC: 8.7 MG/DL (ref 8.8–10.2)
CHLORIDE SERPL-SCNC: 107 MMOL/L (ref 98–107)
CO2 SERPL-SCNC: 23 MMOL/L (ref 20–29)
CREAT SERPL-MCNC: 2.02 MG/DL (ref 0.6–1.1)
DIFFERENTIAL METHOD BLD: ABNORMAL
EOSINOPHIL # BLD: 0.3 K/UL (ref 0–0.8)
EOSINOPHIL NFR BLD: 5.2 % (ref 0.5–7.8)
ERYTHROCYTE [DISTWIDTH] IN BLOOD BY AUTOMATED COUNT: 17 % (ref 11.9–14.6)
GLUCOSE SERPL-MCNC: 105 MG/DL (ref 70–99)
HCT VFR BLD AUTO: 32.8 % (ref 35.8–46.3)
HGB BLD-MCNC: 10.3 G/DL (ref 11.7–15.4)
IMM GRANULOCYTES # BLD AUTO: 0.02 K/UL (ref 0–0.5)
IMM GRANULOCYTES NFR BLD AUTO: 0.3 % (ref 0–5)
LYMPHOCYTES # BLD: 0.89 K/UL (ref 0.5–4.6)
LYMPHOCYTES NFR BLD: 15.3 % (ref 13–44)
MAGNESIUM SERPL-MCNC: 1.9 MG/DL (ref 1.8–2.4)
MCH RBC QN AUTO: 28.7 PG (ref 26.1–32.9)
MCHC RBC AUTO-ENTMCNC: 31.4 G/DL (ref 31.4–35)
MCV RBC AUTO: 91.4 FL (ref 82–102)
MONOCYTES # BLD: 0.59 K/UL (ref 0.1–1.3)
MONOCYTES NFR BLD: 10.1 % (ref 4–12)
NEUTS SEG # BLD: 3.98 K/UL (ref 1.7–8.2)
NEUTS SEG NFR BLD: 68.4 % (ref 43–78)
NRBC # BLD: 0 K/UL (ref 0–0.2)
PLATELET # BLD AUTO: 127 K/UL (ref 150–450)
PMV BLD AUTO: 12.6 FL (ref 9.4–12.3)
POTASSIUM SERPL-SCNC: 4.3 MMOL/L (ref 3.5–5.1)
RBC # BLD AUTO: 3.59 M/UL (ref 4.05–5.2)
SODIUM SERPL-SCNC: 142 MMOL/L (ref 136–145)
WBC # BLD AUTO: 5.8 K/UL (ref 4.3–11.1)

## 2025-05-29 PROCEDURE — 97530 THERAPEUTIC ACTIVITIES: CPT

## 2025-05-29 PROCEDURE — 36415 COLL VENOUS BLD VENIPUNCTURE: CPT

## 2025-05-29 PROCEDURE — 6370000000 HC RX 637 (ALT 250 FOR IP): Performed by: INTERNAL MEDICINE

## 2025-05-29 PROCEDURE — 6360000002 HC RX W HCPCS: Performed by: INTERNAL MEDICINE

## 2025-05-29 PROCEDURE — 94760 N-INVAS EAR/PLS OXIMETRY 1: CPT

## 2025-05-29 PROCEDURE — 80048 BASIC METABOLIC PNL TOTAL CA: CPT

## 2025-05-29 PROCEDURE — 85025 COMPLETE CBC W/AUTO DIFF WBC: CPT

## 2025-05-29 PROCEDURE — 94640 AIRWAY INHALATION TREATMENT: CPT

## 2025-05-29 PROCEDURE — 6370000000 HC RX 637 (ALT 250 FOR IP): Performed by: FAMILY MEDICINE

## 2025-05-29 PROCEDURE — 94761 N-INVAS EAR/PLS OXIMETRY MLT: CPT

## 2025-05-29 PROCEDURE — 76937 US GUIDE VASCULAR ACCESS: CPT

## 2025-05-29 PROCEDURE — 83735 ASSAY OF MAGNESIUM: CPT

## 2025-05-29 PROCEDURE — 1100000000 HC RM PRIVATE

## 2025-05-29 PROCEDURE — 97535 SELF CARE MNGMENT TRAINING: CPT

## 2025-05-29 PROCEDURE — 99233 SBSQ HOSP IP/OBS HIGH 50: CPT | Performed by: INTERNAL MEDICINE

## 2025-05-29 RX ORDER — ROSUVASTATIN CALCIUM 5 MG/1
10 TABLET, COATED ORAL EVERY EVENING
Status: DISCONTINUED | OUTPATIENT
Start: 2025-05-29 | End: 2025-06-03 | Stop reason: HOSPADM

## 2025-05-29 RX ORDER — ARFORMOTEROL TARTRATE 15 UG/2ML
15 SOLUTION RESPIRATORY (INHALATION)
Status: DISCONTINUED | OUTPATIENT
Start: 2025-05-29 | End: 2025-06-03 | Stop reason: HOSPADM

## 2025-05-29 RX ORDER — BUDESONIDE 0.5 MG/2ML
0.5 INHALANT ORAL
Status: DISCONTINUED | OUTPATIENT
Start: 2025-05-29 | End: 2025-06-03 | Stop reason: HOSPADM

## 2025-05-29 RX ADMIN — ARFORMOTEROL TARTRATE 15 MCG: 15 SOLUTION RESPIRATORY (INHALATION) at 07:29

## 2025-05-29 RX ADMIN — BUMETANIDE 1 MG: 1 TABLET ORAL at 08:07

## 2025-05-29 RX ADMIN — ACETAMINOPHEN 650 MG: 325 TABLET, FILM COATED ORAL at 09:10

## 2025-05-29 RX ADMIN — IPRATROPIUM BROMIDE 0.5 MG: 0.5 SOLUTION RESPIRATORY (INHALATION) at 20:31

## 2025-05-29 RX ADMIN — HYDROCODONE BITARTRATE AND HOMATROPINE METHYLBROMIDE 5 ML: 1.5; 5 SOLUTION ORAL at 13:15

## 2025-05-29 RX ADMIN — ROSUVASTATIN CALCIUM 10 MG: 5 TABLET, FILM COATED ORAL at 17:47

## 2025-05-29 RX ADMIN — MICONAZOLE NITRATE: 20 POWDER TOPICAL at 20:44

## 2025-05-29 RX ADMIN — BUDESONIDE 500 MCG: 0.5 INHALANT RESPIRATORY (INHALATION) at 20:31

## 2025-05-29 RX ADMIN — BUMETANIDE 1 MG: 1 TABLET ORAL at 17:47

## 2025-05-29 RX ADMIN — SPIRONOLACTONE 25 MG: 25 TABLET ORAL at 08:07

## 2025-05-29 RX ADMIN — PANTOPRAZOLE SODIUM 40 MG: 40 TABLET, DELAYED RELEASE ORAL at 06:29

## 2025-05-29 RX ADMIN — LEVETIRACETAM 750 MG: 500 TABLET, FILM COATED ORAL at 08:07

## 2025-05-29 RX ADMIN — ASPIRIN 81 MG: 81 TABLET, CHEWABLE ORAL at 08:07

## 2025-05-29 RX ADMIN — IPRATROPIUM BROMIDE 0.5 MG: 0.5 SOLUTION RESPIRATORY (INHALATION) at 07:29

## 2025-05-29 RX ADMIN — HYDROCODONE BITARTRATE AND HOMATROPINE METHYLBROMIDE 5 ML: 1.5; 5 SOLUTION ORAL at 21:15

## 2025-05-29 RX ADMIN — LEVETIRACETAM 750 MG: 500 TABLET, FILM COATED ORAL at 20:34

## 2025-05-29 RX ADMIN — MICONAZOLE NITRATE: 20 POWDER TOPICAL at 10:15

## 2025-05-29 RX ADMIN — BUDESONIDE 500 MCG: 0.5 INHALANT RESPIRATORY (INHALATION) at 07:29

## 2025-05-29 RX ADMIN — ENOXAPARIN SODIUM 30 MG: 100 INJECTION SUBCUTANEOUS at 08:06

## 2025-05-29 RX ADMIN — ARFORMOTEROL TARTRATE 15 MCG: 15 SOLUTION RESPIRATORY (INHALATION) at 20:31

## 2025-05-29 RX ADMIN — MONTELUKAST 10 MG: 10 TABLET, FILM COATED ORAL at 08:07

## 2025-05-29 RX ADMIN — LEVOTHYROXINE SODIUM 150 MCG: 0.07 TABLET ORAL at 06:29

## 2025-05-29 ASSESSMENT — PAIN DESCRIPTION - ORIENTATION: ORIENTATION: MID

## 2025-05-29 ASSESSMENT — PAIN SCALES - GENERAL
PAINLEVEL_OUTOF10: 2
PAINLEVEL_OUTOF10: 3

## 2025-05-29 ASSESSMENT — PAIN DESCRIPTION - LOCATION: LOCATION: BACK

## 2025-05-29 ASSESSMENT — PAIN DESCRIPTION - DESCRIPTORS: DESCRIPTORS: ACHING

## 2025-05-29 ASSESSMENT — PAIN - FUNCTIONAL ASSESSMENT: PAIN_FUNCTIONAL_ASSESSMENT: ACTIVITIES ARE NOT PREVENTED

## 2025-05-29 NOTE — CONSULTS
US Guided PIV access-    Ultrasound was used to find the vein which was compressible and without any ultrasound features of an artery or nerve bundle. Skin was cleaned and disinfected prior to IV puncture.  Under real-time ultrasound guidance peripheral access was obtained in the left cephalic using 20 G 2.5\" Peripheral IV catheter after 1 attempt(s). Blood return was present and IV flushed without difficulty with no clinical signs of infiltration. IV dressing applied and no immediate complications noted. Patient tolerated the procedure well.

## 2025-05-30 ENCOUNTER — APPOINTMENT (OUTPATIENT)
Dept: ULTRASOUND IMAGING | Age: 80
DRG: 291 | End: 2025-05-30
Payer: MEDICARE

## 2025-05-30 LAB
ANION GAP SERPL CALC-SCNC: 11 MMOL/L (ref 7–16)
APPEARANCE UR: ABNORMAL
BACTERIA URNS QL MICRO: ABNORMAL /HPF
BASOPHILS # BLD: 0.03 K/UL (ref 0–0.2)
BASOPHILS NFR BLD: 0.5 % (ref 0–2)
BILIRUB UR QL: NEGATIVE
BUN SERPL-MCNC: 47 MG/DL (ref 8–23)
CALCIUM SERPL-MCNC: 9.3 MG/DL (ref 8.8–10.2)
CHLORIDE SERPL-SCNC: 105 MMOL/L (ref 98–107)
CO2 SERPL-SCNC: 25 MMOL/L (ref 20–29)
COLOR UR: ABNORMAL
CREAT SERPL-MCNC: 2 MG/DL (ref 0.6–1.1)
CREAT UR-MCNC: 27 MG/DL (ref 28–217)
DIFFERENTIAL METHOD BLD: ABNORMAL
EOSINOPHIL # BLD: 0.37 K/UL (ref 0–0.8)
EOSINOPHIL NFR BLD: 6.5 % (ref 0.5–7.8)
EPI CELLS #/AREA URNS HPF: ABNORMAL /HPF
ERYTHROCYTE [DISTWIDTH] IN BLOOD BY AUTOMATED COUNT: 16.8 % (ref 11.9–14.6)
GLUCOSE SERPL-MCNC: 102 MG/DL (ref 70–99)
GLUCOSE UR STRIP.AUTO-MCNC: NEGATIVE MG/DL
HCT VFR BLD AUTO: 34.8 % (ref 35.8–46.3)
HGB BLD-MCNC: 11.1 G/DL (ref 11.7–15.4)
HGB UR QL STRIP: ABNORMAL
IMM GRANULOCYTES # BLD AUTO: 0.02 K/UL (ref 0–0.5)
IMM GRANULOCYTES NFR BLD AUTO: 0.3 % (ref 0–5)
KETONES UR QL STRIP.AUTO: NEGATIVE MG/DL
LEUKOCYTE ESTERASE UR QL STRIP.AUTO: ABNORMAL
LYMPHOCYTES # BLD: 0.95 K/UL (ref 0.5–4.6)
LYMPHOCYTES NFR BLD: 16.6 % (ref 13–44)
MAGNESIUM SERPL-MCNC: 1.9 MG/DL (ref 1.8–2.4)
MCH RBC QN AUTO: 29.1 PG (ref 26.1–32.9)
MCHC RBC AUTO-ENTMCNC: 31.9 G/DL (ref 31.4–35)
MCV RBC AUTO: 91.1 FL (ref 82–102)
MICROALBUMIN UR-MCNC: 2.18 MG/DL (ref 0–20)
MICROALBUMIN/CREAT UR-RTO: 81 MG/G (ref 0–30)
MONOCYTES # BLD: 0.71 K/UL (ref 0.1–1.3)
MONOCYTES NFR BLD: 12.4 % (ref 4–12)
NEUTS SEG # BLD: 3.64 K/UL (ref 1.7–8.2)
NEUTS SEG NFR BLD: 63.7 % (ref 43–78)
NITRITE UR QL STRIP.AUTO: NEGATIVE
NRBC # BLD: 0 K/UL (ref 0–0.2)
PH UR STRIP: 5.5 (ref 5–9)
PLATELET # BLD AUTO: 118 K/UL (ref 150–450)
PMV BLD AUTO: 12.2 FL (ref 9.4–12.3)
POTASSIUM SERPL-SCNC: 4.3 MMOL/L (ref 3.5–5.1)
PROT UR STRIP-MCNC: NEGATIVE MG/DL
RBC # BLD AUTO: 3.82 M/UL (ref 4.05–5.2)
SODIUM SERPL-SCNC: 141 MMOL/L (ref 136–145)
SP GR UR REFRACTOMETRY: 1.01 (ref 1–1.02)
UROBILINOGEN UR QL STRIP.AUTO: 0.2 EU/DL (ref 0.2–1)
WBC # BLD AUTO: 5.7 K/UL (ref 4.3–11.1)
WBC URNS QL MICRO: >100 /HPF

## 2025-05-30 PROCEDURE — 36415 COLL VENOUS BLD VENIPUNCTURE: CPT

## 2025-05-30 PROCEDURE — 82043 UR ALBUMIN QUANTITATIVE: CPT

## 2025-05-30 PROCEDURE — 6370000000 HC RX 637 (ALT 250 FOR IP): Performed by: INTERNAL MEDICINE

## 2025-05-30 PROCEDURE — 6360000002 HC RX W HCPCS: Performed by: INTERNAL MEDICINE

## 2025-05-30 PROCEDURE — 81001 URINALYSIS AUTO W/SCOPE: CPT

## 2025-05-30 PROCEDURE — 85025 COMPLETE CBC W/AUTO DIFF WBC: CPT

## 2025-05-30 PROCEDURE — 80048 BASIC METABOLIC PNL TOTAL CA: CPT

## 2025-05-30 PROCEDURE — 83735 ASSAY OF MAGNESIUM: CPT

## 2025-05-30 PROCEDURE — 87186 SC STD MICRODIL/AGAR DIL: CPT

## 2025-05-30 PROCEDURE — 94760 N-INVAS EAR/PLS OXIMETRY 1: CPT

## 2025-05-30 PROCEDURE — 1100000000 HC RM PRIVATE

## 2025-05-30 PROCEDURE — 2500000003 HC RX 250 WO HCPCS: Performed by: FAMILY MEDICINE

## 2025-05-30 PROCEDURE — 87088 URINE BACTERIA CULTURE: CPT

## 2025-05-30 PROCEDURE — 6370000000 HC RX 637 (ALT 250 FOR IP): Performed by: FAMILY MEDICINE

## 2025-05-30 PROCEDURE — 94660 CPAP INITIATION&MGMT: CPT

## 2025-05-30 PROCEDURE — 97530 THERAPEUTIC ACTIVITIES: CPT

## 2025-05-30 PROCEDURE — 76775 US EXAM ABDO BACK WALL LIM: CPT

## 2025-05-30 PROCEDURE — 94640 AIRWAY INHALATION TREATMENT: CPT

## 2025-05-30 PROCEDURE — 82570 ASSAY OF URINE CREATININE: CPT

## 2025-05-30 PROCEDURE — 94761 N-INVAS EAR/PLS OXIMETRY MLT: CPT

## 2025-05-30 PROCEDURE — 87086 URINE CULTURE/COLONY COUNT: CPT

## 2025-05-30 RX ADMIN — ARFORMOTEROL TARTRATE 15 MCG: 15 SOLUTION RESPIRATORY (INHALATION) at 07:41

## 2025-05-30 RX ADMIN — ENOXAPARIN SODIUM 30 MG: 100 INJECTION SUBCUTANEOUS at 09:11

## 2025-05-30 RX ADMIN — MICONAZOLE NITRATE: 20 POWDER TOPICAL at 21:39

## 2025-05-30 RX ADMIN — SODIUM CHLORIDE, PRESERVATIVE FREE 10 ML: 5 INJECTION INTRAVENOUS at 21:36

## 2025-05-30 RX ADMIN — MICONAZOLE NITRATE: 20 POWDER TOPICAL at 07:00

## 2025-05-30 RX ADMIN — PANTOPRAZOLE SODIUM 40 MG: 40 TABLET, DELAYED RELEASE ORAL at 05:17

## 2025-05-30 RX ADMIN — ASPIRIN 81 MG: 81 TABLET, CHEWABLE ORAL at 09:11

## 2025-05-30 RX ADMIN — IPRATROPIUM BROMIDE 0.5 MG: 0.5 SOLUTION RESPIRATORY (INHALATION) at 07:41

## 2025-05-30 RX ADMIN — IPRATROPIUM BROMIDE 0.5 MG: 0.5 SOLUTION RESPIRATORY (INHALATION) at 21:11

## 2025-05-30 RX ADMIN — HYDROCODONE BITARTRATE AND HOMATROPINE METHYLBROMIDE 5 ML: 1.5; 5 SOLUTION ORAL at 09:11

## 2025-05-30 RX ADMIN — BUDESONIDE 500 MCG: 0.5 INHALANT RESPIRATORY (INHALATION) at 21:11

## 2025-05-30 RX ADMIN — LEVOTHYROXINE SODIUM 150 MCG: 0.07 TABLET ORAL at 05:17

## 2025-05-30 RX ADMIN — BUMETANIDE 1 MG: 1 TABLET ORAL at 18:33

## 2025-05-30 RX ADMIN — BUDESONIDE 500 MCG: 0.5 INHALANT RESPIRATORY (INHALATION) at 07:41

## 2025-05-30 RX ADMIN — HYDROCODONE BITARTRATE AND HOMATROPINE METHYLBROMIDE 5 ML: 1.5; 5 SOLUTION ORAL at 21:32

## 2025-05-30 RX ADMIN — SPIRONOLACTONE 25 MG: 25 TABLET ORAL at 09:11

## 2025-05-30 RX ADMIN — ARFORMOTEROL TARTRATE 15 MCG: 15 SOLUTION RESPIRATORY (INHALATION) at 21:11

## 2025-05-30 RX ADMIN — LEVETIRACETAM 750 MG: 500 TABLET, FILM COATED ORAL at 09:10

## 2025-05-30 RX ADMIN — LEVETIRACETAM 750 MG: 500 TABLET, FILM COATED ORAL at 21:32

## 2025-05-30 RX ADMIN — BUMETANIDE 1 MG: 1 TABLET ORAL at 09:11

## 2025-05-30 RX ADMIN — ACETAMINOPHEN 650 MG: 325 TABLET, FILM COATED ORAL at 21:32

## 2025-05-30 RX ADMIN — MONTELUKAST 10 MG: 10 TABLET, FILM COATED ORAL at 09:11

## 2025-05-30 RX ADMIN — ROSUVASTATIN CALCIUM 10 MG: 5 TABLET, FILM COATED ORAL at 18:33

## 2025-05-30 NOTE — CONSULTS
Nephrology consult    Admission Date:  5/26/2025    Admission Diagnosis  Acute pulmonary edema (HCC) [J81.0]  Acute on chronic diastolic congestive heart failure (HCC) [I50.33]    We are asked by  to see this patient for SEGUNDO on CKD    History of Present Illness:    Ms. Hahn is a 80 yo F with a PMH of HFrEF, aortic stenosis s/p TAVR, HTN and AL amyloid who presented with SOB and swelling.  She was admitted to the hospitalist and started on IV diuresis.  Noted to have a Cr of 1.59 on admission that has increased up to 2.0 with diuresis prompting nephrology consultation today. On my asssessment, she states that she feels much better from admission and his breathing easier.  Her last weight is from the 27th and at that time, she had lost about 6lbs from admission .    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    Arthritis     Asthma     in past    Autoimmune disease     Chronic obstructive pulmonary disease (HCC)     amlyidosis    Dizziness     GERD (gastroesophageal reflux disease)     manage well with meds    Graves disease     H/O therapeutic radiation     Hiatal hernia     History of anticoagulant therapy     treated with Eliquis for about 6 months for LLE DVT-Eliquis D/C 2/2016    History of asthma     History of GI bleed 02/2015    History of pneumonia     History of TIA (transient ischemic attack)     about 16 years ago    Hyperlipidemia     Hypertension     controlled well with meds    Hypothyroidism     managed well with meds    Iron deficiency anemia     followed by Dr Kennedy- hx of DVT treated with Eliquis, received 2 Units of PRBC    Morbid obesity (HCC)     BMI 56    Osteoarthritis     Pulmonary infiltrate     pt not aware of    Seizures (HCC)     controlled well with Keppra- last seizure 2015    Severe aortic stenosis     Followed by [facility]    SOB (shortness of breath)     Thrombocytopenia     Thromboembolus (HCC) 2015    hx of-LEFT LEG DVT    Tightness in

## 2025-05-31 PROBLEM — N39.0 UTI (URINARY TRACT INFECTION): Status: ACTIVE | Noted: 2025-05-31

## 2025-05-31 LAB
ANION GAP SERPL CALC-SCNC: 13 MMOL/L (ref 7–16)
BASOPHILS # BLD: 0.04 K/UL (ref 0–0.2)
BASOPHILS NFR BLD: 0.8 % (ref 0–2)
BUN SERPL-MCNC: 44 MG/DL (ref 8–23)
CALCIUM SERPL-MCNC: 9 MG/DL (ref 8.8–10.2)
CHLORIDE SERPL-SCNC: 103 MMOL/L (ref 98–107)
CO2 SERPL-SCNC: 26 MMOL/L (ref 20–29)
CREAT SERPL-MCNC: 1.79 MG/DL (ref 0.6–1.1)
DIFFERENTIAL METHOD BLD: ABNORMAL
EOSINOPHIL # BLD: 0.35 K/UL (ref 0–0.8)
EOSINOPHIL NFR BLD: 6.9 % (ref 0.5–7.8)
ERYTHROCYTE [DISTWIDTH] IN BLOOD BY AUTOMATED COUNT: 16.7 % (ref 11.9–14.6)
GLUCOSE SERPL-MCNC: 101 MG/DL (ref 70–99)
HCT VFR BLD AUTO: 34.9 % (ref 35.8–46.3)
HGB BLD-MCNC: 11.4 G/DL (ref 11.7–15.4)
IMM GRANULOCYTES # BLD AUTO: 0.02 K/UL (ref 0–0.5)
IMM GRANULOCYTES NFR BLD AUTO: 0.4 % (ref 0–5)
LYMPHOCYTES # BLD: 0.92 K/UL (ref 0.5–4.6)
LYMPHOCYTES NFR BLD: 18 % (ref 13–44)
MAGNESIUM SERPL-MCNC: 1.8 MG/DL (ref 1.8–2.4)
MCH RBC QN AUTO: 29.2 PG (ref 26.1–32.9)
MCHC RBC AUTO-ENTMCNC: 32.7 G/DL (ref 31.4–35)
MCV RBC AUTO: 89.5 FL (ref 82–102)
MONOCYTES # BLD: 0.73 K/UL (ref 0.1–1.3)
MONOCYTES NFR BLD: 14.3 % (ref 4–12)
NEUTS SEG # BLD: 3.04 K/UL (ref 1.7–8.2)
NEUTS SEG NFR BLD: 59.6 % (ref 43–78)
NRBC # BLD: 0 K/UL (ref 0–0.2)
PLATELET # BLD AUTO: 112 K/UL (ref 150–450)
PMV BLD AUTO: 11.9 FL (ref 9.4–12.3)
POTASSIUM SERPL-SCNC: 3.8 MMOL/L (ref 3.5–5.1)
RBC # BLD AUTO: 3.9 M/UL (ref 4.05–5.2)
SODIUM SERPL-SCNC: 142 MMOL/L (ref 136–145)
WBC # BLD AUTO: 5.1 K/UL (ref 4.3–11.1)

## 2025-05-31 PROCEDURE — 94640 AIRWAY INHALATION TREATMENT: CPT

## 2025-05-31 PROCEDURE — 6370000000 HC RX 637 (ALT 250 FOR IP): Performed by: FAMILY MEDICINE

## 2025-05-31 PROCEDURE — 2500000003 HC RX 250 WO HCPCS: Performed by: INTERNAL MEDICINE

## 2025-05-31 PROCEDURE — 6360000002 HC RX W HCPCS: Performed by: INTERNAL MEDICINE

## 2025-05-31 PROCEDURE — 83735 ASSAY OF MAGNESIUM: CPT

## 2025-05-31 PROCEDURE — 94761 N-INVAS EAR/PLS OXIMETRY MLT: CPT

## 2025-05-31 PROCEDURE — 36415 COLL VENOUS BLD VENIPUNCTURE: CPT

## 2025-05-31 PROCEDURE — 80048 BASIC METABOLIC PNL TOTAL CA: CPT

## 2025-05-31 PROCEDURE — 1100000000 HC RM PRIVATE

## 2025-05-31 PROCEDURE — 94760 N-INVAS EAR/PLS OXIMETRY 1: CPT

## 2025-05-31 PROCEDURE — 6370000000 HC RX 637 (ALT 250 FOR IP): Performed by: INTERNAL MEDICINE

## 2025-05-31 PROCEDURE — 85025 COMPLETE CBC W/AUTO DIFF WBC: CPT

## 2025-05-31 PROCEDURE — 2700000000 HC OXYGEN THERAPY PER DAY

## 2025-05-31 RX ORDER — POLYETHYLENE GLYCOL 3350 17 G/17G
17 POWDER, FOR SOLUTION ORAL DAILY PRN
Status: DISCONTINUED | OUTPATIENT
Start: 2025-05-31 | End: 2025-06-03 | Stop reason: HOSPADM

## 2025-05-31 RX ADMIN — ENOXAPARIN SODIUM 30 MG: 100 INJECTION SUBCUTANEOUS at 10:13

## 2025-05-31 RX ADMIN — ROSUVASTATIN CALCIUM 10 MG: 5 TABLET, FILM COATED ORAL at 18:00

## 2025-05-31 RX ADMIN — BUDESONIDE 500 MCG: 0.5 INHALANT RESPIRATORY (INHALATION) at 20:52

## 2025-05-31 RX ADMIN — BUMETANIDE 1 MG: 1 TABLET ORAL at 18:00

## 2025-05-31 RX ADMIN — BUDESONIDE 500 MCG: 0.5 INHALANT RESPIRATORY (INHALATION) at 07:51

## 2025-05-31 RX ADMIN — HYDROCODONE BITARTRATE AND HOMATROPINE METHYLBROMIDE 5 ML: 1.5; 5 SOLUTION ORAL at 22:23

## 2025-05-31 RX ADMIN — ARFORMOTEROL TARTRATE 15 MCG: 15 SOLUTION RESPIRATORY (INHALATION) at 07:51

## 2025-05-31 RX ADMIN — BUMETANIDE 1 MG: 1 TABLET ORAL at 10:13

## 2025-05-31 RX ADMIN — MONTELUKAST 10 MG: 10 TABLET, FILM COATED ORAL at 10:13

## 2025-05-31 RX ADMIN — PANTOPRAZOLE SODIUM 40 MG: 40 TABLET, DELAYED RELEASE ORAL at 06:10

## 2025-05-31 RX ADMIN — MICONAZOLE NITRATE: 20 POWDER TOPICAL at 20:42

## 2025-05-31 RX ADMIN — IPRATROPIUM BROMIDE 0.5 MG: 0.5 SOLUTION RESPIRATORY (INHALATION) at 20:52

## 2025-05-31 RX ADMIN — IPRATROPIUM BROMIDE 0.5 MG: 0.5 SOLUTION RESPIRATORY (INHALATION) at 07:51

## 2025-05-31 RX ADMIN — LEVOTHYROXINE SODIUM 150 MCG: 0.07 TABLET ORAL at 06:10

## 2025-05-31 RX ADMIN — CEFTRIAXONE 1000 MG: 1 INJECTION, POWDER, FOR SOLUTION INTRAMUSCULAR; INTRAVENOUS at 10:13

## 2025-05-31 RX ADMIN — ARFORMOTEROL TARTRATE 15 MCG: 15 SOLUTION RESPIRATORY (INHALATION) at 20:52

## 2025-05-31 RX ADMIN — ASPIRIN 81 MG: 81 TABLET, CHEWABLE ORAL at 10:13

## 2025-05-31 RX ADMIN — ACETAMINOPHEN 650 MG: 325 TABLET, FILM COATED ORAL at 20:27

## 2025-05-31 RX ADMIN — SPIRONOLACTONE 25 MG: 25 TABLET ORAL at 10:13

## 2025-05-31 RX ADMIN — LEVETIRACETAM 750 MG: 500 TABLET, FILM COATED ORAL at 20:26

## 2025-05-31 RX ADMIN — LEVETIRACETAM 750 MG: 500 TABLET, FILM COATED ORAL at 10:13

## 2025-05-31 NOTE — PLAN OF CARE
Problem: Chronic Conditions and Co-morbidities  Goal: Patient's chronic conditions and co-morbidity symptoms are monitored and maintained or improved  Outcome: Progressing     Problem: Skin/Tissue Integrity  Goal: Skin integrity remains intact  Description: 1.  Monitor for areas of redness and/or skin breakdown2.  Assess vascular access sites hourly3.  Every 4-6 hours minimum:  Change oxygen saturation probe site4.  Every 4-6 hours:  If on nasal continuous positive airway pressure, respiratory therapy assess nares and determine need for appliance change or resting period  Outcome: Progressing     Problem: Respiratory - Adult  Goal: Achieves optimal ventilation and oxygenation  Outcome: Progressing     Problem: Musculoskeletal - Adult  Goal: Return mobility to safest level of function  Outcome: Progressing

## 2025-06-01 LAB
ANION GAP SERPL CALC-SCNC: 14 MMOL/L (ref 7–16)
BASOPHILS # BLD: 0.02 K/UL (ref 0–0.2)
BASOPHILS NFR BLD: 0.4 % (ref 0–2)
BUN SERPL-MCNC: 45 MG/DL (ref 8–23)
CALCIUM SERPL-MCNC: 9 MG/DL (ref 8.8–10.2)
CHLORIDE SERPL-SCNC: 102 MMOL/L (ref 98–107)
CO2 SERPL-SCNC: 28 MMOL/L (ref 20–29)
CREAT SERPL-MCNC: 1.89 MG/DL (ref 0.6–1.1)
DIFFERENTIAL METHOD BLD: ABNORMAL
EOSINOPHIL # BLD: 0.28 K/UL (ref 0–0.8)
EOSINOPHIL NFR BLD: 5.7 % (ref 0.5–7.8)
ERYTHROCYTE [DISTWIDTH] IN BLOOD BY AUTOMATED COUNT: 16.7 % (ref 11.9–14.6)
GLUCOSE SERPL-MCNC: 101 MG/DL (ref 70–99)
HCT VFR BLD AUTO: 34.6 % (ref 35.8–46.3)
HGB BLD-MCNC: 11 G/DL (ref 11.7–15.4)
IMM GRANULOCYTES # BLD AUTO: 0.02 K/UL (ref 0–0.5)
IMM GRANULOCYTES NFR BLD AUTO: 0.4 % (ref 0–5)
LYMPHOCYTES # BLD: 1.07 K/UL (ref 0.5–4.6)
LYMPHOCYTES NFR BLD: 21.8 % (ref 13–44)
MCH RBC QN AUTO: 28.6 PG (ref 26.1–32.9)
MCHC RBC AUTO-ENTMCNC: 31.8 G/DL (ref 31.4–35)
MCV RBC AUTO: 90.1 FL (ref 82–102)
MONOCYTES # BLD: 0.75 K/UL (ref 0.1–1.3)
MONOCYTES NFR BLD: 15.3 % (ref 4–12)
NEUTS SEG # BLD: 2.77 K/UL (ref 1.7–8.2)
NEUTS SEG NFR BLD: 56.4 % (ref 43–78)
NRBC # BLD: 0 K/UL (ref 0–0.2)
PLATELET # BLD AUTO: 126 K/UL (ref 150–450)
PMV BLD AUTO: 12 FL (ref 9.4–12.3)
POTASSIUM SERPL-SCNC: 3.9 MMOL/L (ref 3.5–5.1)
RBC # BLD AUTO: 3.84 M/UL (ref 4.05–5.2)
SODIUM SERPL-SCNC: 144 MMOL/L (ref 136–145)
WBC # BLD AUTO: 4.9 K/UL (ref 4.3–11.1)

## 2025-06-01 PROCEDURE — 6370000000 HC RX 637 (ALT 250 FOR IP): Performed by: INTERNAL MEDICINE

## 2025-06-01 PROCEDURE — 6360000002 HC RX W HCPCS: Performed by: INTERNAL MEDICINE

## 2025-06-01 PROCEDURE — 2580000003 HC RX 258: Performed by: INTERNAL MEDICINE

## 2025-06-01 PROCEDURE — 80048 BASIC METABOLIC PNL TOTAL CA: CPT

## 2025-06-01 PROCEDURE — 94660 CPAP INITIATION&MGMT: CPT

## 2025-06-01 PROCEDURE — 97530 THERAPEUTIC ACTIVITIES: CPT

## 2025-06-01 PROCEDURE — 2500000003 HC RX 250 WO HCPCS: Performed by: FAMILY MEDICINE

## 2025-06-01 PROCEDURE — 85025 COMPLETE CBC W/AUTO DIFF WBC: CPT

## 2025-06-01 PROCEDURE — 1100000000 HC RM PRIVATE

## 2025-06-01 PROCEDURE — 94761 N-INVAS EAR/PLS OXIMETRY MLT: CPT

## 2025-06-01 PROCEDURE — 36415 COLL VENOUS BLD VENIPUNCTURE: CPT

## 2025-06-01 PROCEDURE — 6370000000 HC RX 637 (ALT 250 FOR IP): Performed by: FAMILY MEDICINE

## 2025-06-01 PROCEDURE — 94640 AIRWAY INHALATION TREATMENT: CPT

## 2025-06-01 RX ORDER — LEVETIRACETAM 500 MG/1
500 TABLET ORAL 2 TIMES DAILY
Status: DISCONTINUED | OUTPATIENT
Start: 2025-06-01 | End: 2025-06-03 | Stop reason: HOSPADM

## 2025-06-01 RX ADMIN — SPIRONOLACTONE 25 MG: 25 TABLET ORAL at 09:42

## 2025-06-01 RX ADMIN — ASPIRIN 81 MG: 81 TABLET, CHEWABLE ORAL at 09:42

## 2025-06-01 RX ADMIN — HYDROCODONE BITARTRATE AND HOMATROPINE METHYLBROMIDE 5 ML: 1.5; 5 SOLUTION ORAL at 23:44

## 2025-06-01 RX ADMIN — LEVETIRACETAM 500 MG: 500 TABLET, FILM COATED ORAL at 20:36

## 2025-06-01 RX ADMIN — ARFORMOTEROL TARTRATE 15 MCG: 15 SOLUTION RESPIRATORY (INHALATION) at 20:59

## 2025-06-01 RX ADMIN — ROSUVASTATIN CALCIUM 10 MG: 5 TABLET, FILM COATED ORAL at 18:08

## 2025-06-01 RX ADMIN — BUMETANIDE 1 MG: 1 TABLET ORAL at 18:08

## 2025-06-01 RX ADMIN — HYDROCODONE BITARTRATE AND HOMATROPINE METHYLBROMIDE 5 ML: 1.5; 5 SOLUTION ORAL at 09:48

## 2025-06-01 RX ADMIN — BUDESONIDE 500 MCG: 0.5 INHALANT RESPIRATORY (INHALATION) at 07:16

## 2025-06-01 RX ADMIN — LEVOTHYROXINE SODIUM 150 MCG: 0.07 TABLET ORAL at 05:46

## 2025-06-01 RX ADMIN — MICONAZOLE NITRATE: 20 POWDER TOPICAL at 20:38

## 2025-06-01 RX ADMIN — ACETAMINOPHEN 650 MG: 325 TABLET, FILM COATED ORAL at 09:48

## 2025-06-01 RX ADMIN — IPRATROPIUM BROMIDE 0.5 MG: 0.5 SOLUTION RESPIRATORY (INHALATION) at 20:59

## 2025-06-01 RX ADMIN — BUDESONIDE 500 MCG: 0.5 INHALANT RESPIRATORY (INHALATION) at 20:59

## 2025-06-01 RX ADMIN — ARFORMOTEROL TARTRATE 15 MCG: 15 SOLUTION RESPIRATORY (INHALATION) at 07:16

## 2025-06-01 RX ADMIN — PANTOPRAZOLE SODIUM 40 MG: 40 TABLET, DELAYED RELEASE ORAL at 05:46

## 2025-06-01 RX ADMIN — LEVETIRACETAM 750 MG: 500 TABLET, FILM COATED ORAL at 09:42

## 2025-06-01 RX ADMIN — ENOXAPARIN SODIUM 30 MG: 100 INJECTION SUBCUTANEOUS at 09:41

## 2025-06-01 RX ADMIN — CEFTRIAXONE SODIUM 2000 MG: 2 INJECTION, POWDER, FOR SOLUTION INTRAMUSCULAR; INTRAVENOUS at 09:52

## 2025-06-01 RX ADMIN — SODIUM CHLORIDE, PRESERVATIVE FREE 10 ML: 5 INJECTION INTRAVENOUS at 20:43

## 2025-06-01 RX ADMIN — BUMETANIDE 1 MG: 1 TABLET ORAL at 09:42

## 2025-06-01 RX ADMIN — IPRATROPIUM BROMIDE 0.5 MG: 0.5 SOLUTION RESPIRATORY (INHALATION) at 07:16

## 2025-06-01 RX ADMIN — MONTELUKAST 10 MG: 10 TABLET, FILM COATED ORAL at 09:42

## 2025-06-01 ASSESSMENT — PAIN SCALES - GENERAL
PAINLEVEL_OUTOF10: 2
PAINLEVEL_OUTOF10: 3

## 2025-06-01 ASSESSMENT — PAIN DESCRIPTION - DESCRIPTORS: DESCRIPTORS: ACHING

## 2025-06-01 ASSESSMENT — PAIN DESCRIPTION - LOCATION: LOCATION: HEAD

## 2025-06-01 NOTE — PLAN OF CARE
Problem: Chronic Conditions and Co-morbidities  Goal: Patient's chronic conditions and co-morbidity symptoms are monitored and maintained or improved  5/31/2025 2134 by Terry Polanco RN  Outcome: Progressing  5/31/2025 2108 by Terry Polanco RN  Outcome: Progressing     Problem: Skin/Tissue Integrity  Goal: Skin integrity remains intact  Description: 1.  Monitor for areas of redness and/or skin breakdown2.  Assess vascular access sites hourly3.  Every 4-6 hours minimum:  Change oxygen saturation probe site4.  Every 4-6 hours:  If on nasal continuous positive airway pressure, respiratory therapy assess nares and determine need for appliance change or resting period  5/31/2025 2134 by Terry Polanco RN  Outcome: Progressing  5/31/2025 2108 by Terry Polanco RN  Outcome: Progressing     Problem: Metabolic/Fluid and Electrolytes - Adult  Goal: Electrolytes maintained within normal limits  5/31/2025 2134 by Terry Polanco RN  Outcome: Progressing  5/31/2025 2108 by Terry Polanco RN  Outcome: Progressing     Problem: Gastrointestinal - Adult  Goal: Maintains adequate nutritional intake  5/31/2025 2134 by Terry Polanco RN  Outcome: Progressing  5/31/2025 2108 by Terry Polanco RN  Outcome: Progressing

## 2025-06-01 NOTE — CARE COORDINATION
CM note:    Chart reviewed for updates. Pt accepted to [facility] pending insurance auth. CM will continue to follow up with d/c planning.    NATHANIEL Villar

## 2025-06-01 NOTE — PLAN OF CARE
Problem: Chronic Conditions and Co-morbidities  Goal: Patient's chronic conditions and co-morbidity symptoms are monitored and maintained or improved  Outcome: Progressing     Problem: Skin/Tissue Integrity  Goal: Skin integrity remains intact  Description: 1.  Monitor for areas of redness and/or skin breakdown2.  Assess vascular access sites hourly3.  Every 4-6 hours minimum:  Change oxygen saturation probe site4.  Every 4-6 hours:  If on nasal continuous positive airway pressure, respiratory therapy assess nares and determine need for appliance change or resting period  Outcome: Progressing     Problem: Musculoskeletal - Adult  Goal: Return mobility to safest level of function  Outcome: Progressing     Problem: Genitourinary - Adult  Goal: Absence of urinary retention  Outcome: Progressing

## 2025-06-02 LAB
ANION GAP SERPL CALC-SCNC: 13 MMOL/L (ref 7–16)
BASOPHILS # BLD: 0.04 K/UL (ref 0–0.2)
BASOPHILS NFR BLD: 0.8 % (ref 0–2)
BUN SERPL-MCNC: 46 MG/DL (ref 8–23)
CALCIUM SERPL-MCNC: 9.1 MG/DL (ref 8.8–10.2)
CHLORIDE SERPL-SCNC: 102 MMOL/L (ref 98–107)
CO2 SERPL-SCNC: 27 MMOL/L (ref 20–29)
CREAT SERPL-MCNC: 1.85 MG/DL (ref 0.6–1.1)
DIFFERENTIAL METHOD BLD: ABNORMAL
EOSINOPHIL # BLD: 0.3 K/UL (ref 0–0.8)
EOSINOPHIL NFR BLD: 6 % (ref 0.5–7.8)
ERYTHROCYTE [DISTWIDTH] IN BLOOD BY AUTOMATED COUNT: 16.4 % (ref 11.9–14.6)
GLUCOSE SERPL-MCNC: 104 MG/DL (ref 70–99)
HCT VFR BLD AUTO: 35.7 % (ref 35.8–46.3)
HGB BLD-MCNC: 11.4 G/DL (ref 11.7–15.4)
IMM GRANULOCYTES # BLD AUTO: 0.02 K/UL (ref 0–0.5)
IMM GRANULOCYTES NFR BLD AUTO: 0.4 % (ref 0–5)
LYMPHOCYTES # BLD: 0.88 K/UL (ref 0.5–4.6)
LYMPHOCYTES NFR BLD: 17.6 % (ref 13–44)
MCH RBC QN AUTO: 28.9 PG (ref 26.1–32.9)
MCHC RBC AUTO-ENTMCNC: 31.9 G/DL (ref 31.4–35)
MCV RBC AUTO: 90.4 FL (ref 82–102)
MONOCYTES # BLD: 0.81 K/UL (ref 0.1–1.3)
MONOCYTES NFR BLD: 16.2 % (ref 4–12)
NEUTS SEG # BLD: 2.95 K/UL (ref 1.7–8.2)
NEUTS SEG NFR BLD: 59 % (ref 43–78)
NRBC # BLD: 0 K/UL (ref 0–0.2)
PLATELET # BLD AUTO: 112 K/UL (ref 150–450)
PMV BLD AUTO: 12.5 FL (ref 9.4–12.3)
POTASSIUM SERPL-SCNC: 3.8 MMOL/L (ref 3.5–5.1)
RBC # BLD AUTO: 3.95 M/UL (ref 4.05–5.2)
SODIUM SERPL-SCNC: 142 MMOL/L (ref 136–145)
WBC # BLD AUTO: 5 K/UL (ref 4.3–11.1)

## 2025-06-02 PROCEDURE — 6370000000 HC RX 637 (ALT 250 FOR IP): Performed by: INTERNAL MEDICINE

## 2025-06-02 PROCEDURE — 6370000000 HC RX 637 (ALT 250 FOR IP): Performed by: FAMILY MEDICINE

## 2025-06-02 PROCEDURE — 94760 N-INVAS EAR/PLS OXIMETRY 1: CPT

## 2025-06-02 PROCEDURE — 2500000003 HC RX 250 WO HCPCS: Performed by: FAMILY MEDICINE

## 2025-06-02 PROCEDURE — 6360000002 HC RX W HCPCS: Performed by: INTERNAL MEDICINE

## 2025-06-02 PROCEDURE — 97530 THERAPEUTIC ACTIVITIES: CPT

## 2025-06-02 PROCEDURE — 1100000000 HC RM PRIVATE

## 2025-06-02 PROCEDURE — 36415 COLL VENOUS BLD VENIPUNCTURE: CPT

## 2025-06-02 PROCEDURE — 97535 SELF CARE MNGMENT TRAINING: CPT

## 2025-06-02 PROCEDURE — 80048 BASIC METABOLIC PNL TOTAL CA: CPT

## 2025-06-02 PROCEDURE — 2700000000 HC OXYGEN THERAPY PER DAY

## 2025-06-02 PROCEDURE — 94640 AIRWAY INHALATION TREATMENT: CPT

## 2025-06-02 PROCEDURE — 2580000003 HC RX 258: Performed by: INTERNAL MEDICINE

## 2025-06-02 PROCEDURE — 85025 COMPLETE CBC W/AUTO DIFF WBC: CPT

## 2025-06-02 RX ORDER — AMOXICILLIN 250 MG/1
250 CAPSULE ORAL EVERY 12 HOURS SCHEDULED
Status: DISCONTINUED | OUTPATIENT
Start: 2025-06-02 | End: 2025-06-03 | Stop reason: HOSPADM

## 2025-06-02 RX ADMIN — BUMETANIDE 1 MG: 1 TABLET ORAL at 17:34

## 2025-06-02 RX ADMIN — ENOXAPARIN SODIUM 30 MG: 100 INJECTION SUBCUTANEOUS at 09:50

## 2025-06-02 RX ADMIN — IPRATROPIUM BROMIDE 0.5 MG: 0.5 SOLUTION RESPIRATORY (INHALATION) at 07:20

## 2025-06-02 RX ADMIN — BUDESONIDE 500 MCG: 0.5 INHALANT RESPIRATORY (INHALATION) at 07:20

## 2025-06-02 RX ADMIN — MONTELUKAST 10 MG: 10 TABLET, FILM COATED ORAL at 09:49

## 2025-06-02 RX ADMIN — SODIUM CHLORIDE, PRESERVATIVE FREE 10 ML: 5 INJECTION INTRAVENOUS at 20:44

## 2025-06-02 RX ADMIN — LEVETIRACETAM 500 MG: 500 TABLET, FILM COATED ORAL at 20:43

## 2025-06-02 RX ADMIN — LEVOTHYROXINE SODIUM 150 MCG: 0.07 TABLET ORAL at 05:32

## 2025-06-02 RX ADMIN — PANTOPRAZOLE SODIUM 40 MG: 40 TABLET, DELAYED RELEASE ORAL at 05:32

## 2025-06-02 RX ADMIN — ASPIRIN 81 MG: 81 TABLET, CHEWABLE ORAL at 09:49

## 2025-06-02 RX ADMIN — BUDESONIDE 500 MCG: 0.5 INHALANT RESPIRATORY (INHALATION) at 21:14

## 2025-06-02 RX ADMIN — BUMETANIDE 1 MG: 1 TABLET ORAL at 09:50

## 2025-06-02 RX ADMIN — CEFTRIAXONE SODIUM 2000 MG: 2 INJECTION, POWDER, FOR SOLUTION INTRAMUSCULAR; INTRAVENOUS at 09:57

## 2025-06-02 RX ADMIN — AMOXICILLIN 250 MG: 250 CAPSULE ORAL at 20:43

## 2025-06-02 RX ADMIN — HYDROCODONE BITARTRATE AND HOMATROPINE METHYLBROMIDE 5 ML: 1.5; 5 SOLUTION ORAL at 20:53

## 2025-06-02 RX ADMIN — LEVETIRACETAM 500 MG: 500 TABLET, FILM COATED ORAL at 09:49

## 2025-06-02 RX ADMIN — ARFORMOTEROL TARTRATE 15 MCG: 15 SOLUTION RESPIRATORY (INHALATION) at 07:20

## 2025-06-02 RX ADMIN — MICONAZOLE NITRATE: 20 POWDER TOPICAL at 09:50

## 2025-06-02 RX ADMIN — IPRATROPIUM BROMIDE 0.5 MG: 0.5 SOLUTION RESPIRATORY (INHALATION) at 21:14

## 2025-06-02 RX ADMIN — ROSUVASTATIN CALCIUM 10 MG: 5 TABLET, FILM COATED ORAL at 17:34

## 2025-06-02 RX ADMIN — ARFORMOTEROL TARTRATE 15 MCG: 15 SOLUTION RESPIRATORY (INHALATION) at 21:14

## 2025-06-02 RX ADMIN — SPIRONOLACTONE 25 MG: 25 TABLET ORAL at 09:49

## 2025-06-02 ASSESSMENT — PAIN SCALES - GENERAL: PAINLEVEL_OUTOF10: 0

## 2025-06-02 NOTE — CARE COORDINATION
CM note:    Pt's auth has been denied. Pt has been notified and given the denial letter. CM discussed arranging home health services. Pt is agreeable with home health. Home Health choice list given to pt and spouse. CM to meet with them tomorrow to get their home health choice. MD Delgado given updates. CM will continue to follow up with d/c planning.    NATHANIEL Villar

## 2025-06-02 NOTE — PLAN OF CARE
Problem: Chronic Conditions and Co-morbidities  Goal: Patient's chronic conditions and co-morbidity symptoms are monitored and maintained or improved  Outcome: Progressing     Problem: Skin/Tissue Integrity  Goal: Skin integrity remains intact  Description: 1.  Monitor for areas of redness and/or skin breakdown2.  Assess vascular access sites hourly3.  Every 4-6 hours minimum:  Change oxygen saturation probe site4.  Every 4-6 hours:  If on nasal continuous positive airway pressure, respiratory therapy assess nares and determine need for appliance change or resting period  6/1/2025 2244 by Terry Polanco, RN  Outcome: Progressing  6/1/2025 1203 by Lakshmi Negrete, RN  Outcome: Progressing     Problem: Respiratory - Adult  Goal: Achieves optimal ventilation and oxygenation  Outcome: Progressing     Problem: Musculoskeletal - Adult  Goal: Return mobility to safest level of function  Outcome: Progressing     Problem: Genitourinary - Adult  Goal: Absence of urinary retention  Outcome: Progressing

## 2025-06-02 NOTE — CARE COORDINATION
CM note:    Chart reviewed for updates. Pt is medically stable for discharge. Insurance auth is still pending. CM will continue to follow up with d/c planning.    NATHANIEL Villar

## 2025-06-03 VITALS
HEIGHT: 60 IN | DIASTOLIC BLOOD PRESSURE: 75 MMHG | RESPIRATION RATE: 14 BRPM | WEIGHT: 286.4 LBS | SYSTOLIC BLOOD PRESSURE: 135 MMHG | OXYGEN SATURATION: 94 % | HEART RATE: 87 BPM | BODY MASS INDEX: 56.23 KG/M2 | TEMPERATURE: 97.9 F

## 2025-06-03 LAB
ANION GAP SERPL CALC-SCNC: 13 MMOL/L (ref 7–16)
BASOPHILS # BLD: 0.02 K/UL (ref 0–0.2)
BASOPHILS NFR BLD: 0.4 % (ref 0–2)
BUN SERPL-MCNC: 46 MG/DL (ref 8–23)
CALCIUM SERPL-MCNC: 8.8 MG/DL (ref 8.8–10.2)
CHLORIDE SERPL-SCNC: 103 MMOL/L (ref 98–107)
CO2 SERPL-SCNC: 26 MMOL/L (ref 20–29)
CREAT SERPL-MCNC: 1.96 MG/DL (ref 0.6–1.1)
DIFFERENTIAL METHOD BLD: ABNORMAL
EOSINOPHIL # BLD: 0.3 K/UL (ref 0–0.8)
EOSINOPHIL NFR BLD: 6.4 % (ref 0.5–7.8)
ERYTHROCYTE [DISTWIDTH] IN BLOOD BY AUTOMATED COUNT: 16.5 % (ref 11.9–14.6)
GLUCOSE SERPL-MCNC: 110 MG/DL (ref 70–99)
HCT VFR BLD AUTO: 34.4 % (ref 35.8–46.3)
HGB BLD-MCNC: 11 G/DL (ref 11.7–15.4)
IMM GRANULOCYTES # BLD AUTO: 0.02 K/UL (ref 0–0.5)
IMM GRANULOCYTES NFR BLD AUTO: 0.4 % (ref 0–5)
LYMPHOCYTES # BLD: 1.08 K/UL (ref 0.5–4.6)
LYMPHOCYTES NFR BLD: 22.9 % (ref 13–44)
MCH RBC QN AUTO: 28.4 PG (ref 26.1–32.9)
MCHC RBC AUTO-ENTMCNC: 32 G/DL (ref 31.4–35)
MCV RBC AUTO: 88.9 FL (ref 82–102)
MONOCYTES # BLD: 0.76 K/UL (ref 0.1–1.3)
MONOCYTES NFR BLD: 16.1 % (ref 4–12)
NEUTS SEG # BLD: 2.54 K/UL (ref 1.7–8.2)
NEUTS SEG NFR BLD: 53.8 % (ref 43–78)
NRBC # BLD: 0 K/UL (ref 0–0.2)
PLATELET # BLD AUTO: 122 K/UL (ref 150–450)
PMV BLD AUTO: 11.8 FL (ref 9.4–12.3)
POTASSIUM SERPL-SCNC: 3.7 MMOL/L (ref 3.5–5.1)
RBC # BLD AUTO: 3.87 M/UL (ref 4.05–5.2)
SODIUM SERPL-SCNC: 142 MMOL/L (ref 136–145)
WBC # BLD AUTO: 4.7 K/UL (ref 4.3–11.1)

## 2025-06-03 PROCEDURE — 85025 COMPLETE CBC W/AUTO DIFF WBC: CPT

## 2025-06-03 PROCEDURE — 6370000000 HC RX 637 (ALT 250 FOR IP): Performed by: INTERNAL MEDICINE

## 2025-06-03 PROCEDURE — 6360000002 HC RX W HCPCS: Performed by: INTERNAL MEDICINE

## 2025-06-03 PROCEDURE — 94760 N-INVAS EAR/PLS OXIMETRY 1: CPT

## 2025-06-03 PROCEDURE — 94640 AIRWAY INHALATION TREATMENT: CPT

## 2025-06-03 PROCEDURE — 6370000000 HC RX 637 (ALT 250 FOR IP): Performed by: FAMILY MEDICINE

## 2025-06-03 PROCEDURE — 36415 COLL VENOUS BLD VENIPUNCTURE: CPT

## 2025-06-03 PROCEDURE — 80048 BASIC METABOLIC PNL TOTAL CA: CPT

## 2025-06-03 RX ORDER — LEVETIRACETAM 500 MG/1
500 TABLET ORAL 2 TIMES DAILY
Qty: 60 TABLET | Refills: 3 | Status: SHIPPED | OUTPATIENT
Start: 2025-06-03

## 2025-06-03 RX ORDER — BUMETANIDE 1 MG/1
1 TABLET ORAL 2 TIMES DAILY
Qty: 30 TABLET | Refills: 3 | Status: SHIPPED | OUTPATIENT
Start: 2025-06-03

## 2025-06-03 RX ADMIN — LEVOTHYROXINE SODIUM 150 MCG: 0.07 TABLET ORAL at 06:04

## 2025-06-03 RX ADMIN — BUMETANIDE 1 MG: 1 TABLET ORAL at 09:11

## 2025-06-03 RX ADMIN — AMOXICILLIN 250 MG: 250 CAPSULE ORAL at 09:12

## 2025-06-03 RX ADMIN — ASPIRIN 81 MG: 81 TABLET, CHEWABLE ORAL at 09:12

## 2025-06-03 RX ADMIN — ENOXAPARIN SODIUM 30 MG: 100 INJECTION SUBCUTANEOUS at 09:13

## 2025-06-03 RX ADMIN — MONTELUKAST 10 MG: 10 TABLET, FILM COATED ORAL at 09:12

## 2025-06-03 RX ADMIN — SPIRONOLACTONE 25 MG: 25 TABLET ORAL at 09:11

## 2025-06-03 RX ADMIN — IPRATROPIUM BROMIDE 0.5 MG: 0.5 SOLUTION RESPIRATORY (INHALATION) at 08:43

## 2025-06-03 RX ADMIN — PANTOPRAZOLE SODIUM 40 MG: 40 TABLET, DELAYED RELEASE ORAL at 06:04

## 2025-06-03 RX ADMIN — BUDESONIDE 500 MCG: 0.5 INHALANT RESPIRATORY (INHALATION) at 08:43

## 2025-06-03 RX ADMIN — ARFORMOTEROL TARTRATE 15 MCG: 15 SOLUTION RESPIRATORY (INHALATION) at 08:43

## 2025-06-03 RX ADMIN — LEVETIRACETAM 500 MG: 500 TABLET, FILM COATED ORAL at 09:12

## 2025-06-03 NOTE — ADT AUTH CERT
INPATIENT ADMISSION NOTIFICATION     LEVEL OF CARE - INPATIENT   (EMERGENCY ADMISSION)     ADMISSION DATE 25  PATIENT IS STILL IN HOUSE     PATIENT    DOROTHY VALADEZ    1945  ID [id number]    [facility]  NPI [id number]  TAX ID [id number]    FACILITY ADDRESS    [address] [place] [address] [place] [address]    ADMITTING DR Cho, Steven Prasad MD - NPI [id number]         ADMITTING DX    I50.33 (ICD-10-CM) - Acute on chronic diastolic congestive heart failure (HCC)       ALL CLINICAL TO FOLLOW*

## 2025-06-03 NOTE — FLOWSHEET NOTE
06/03/25 1531   AVS Reviewed   AVS & discharge instructions reviewed with patient and/or representative? Yes   Reviewed instructions with Patient   Level of Understanding Questions answered     IV removed without complications.

## 2025-06-03 NOTE — DISCHARGE INSTRUCTIONS
Advance Care Planning for Heart Failure: Care Instructions  Your Care Instructions     If you have heart failure, taking care of yourself will help you feel better and live longer. But the disease often gets worse over time. So it may be a good idea to plan for the future now while you are active and able to communicate your wishes.  If you do this kind of planning, it does not mean that you are giving up. It's simply the best way to make sure that you get the care and treatment that you want. It can also make things much easier for your loved ones.  What can you do to plan for the end of life?  Talk openly and honestly with your family and doctor. This is the best way to understand the decisions you will need to make as your health changes. Know that you can always change your mind.  Ask your doctor about common life-support treatments. These include tube feedings, breathing machines, and fluids given through a vein (I.V.). It may be easier to decide if you want them after you are sure you understand what they are and how they may help you.  Think about preparing written papers that state your wishes. These papers are called advance directives. If you do this early and review them often, there will not be any confusion about your wishes. You can change your instructions at any time.  If you are near the end of your life and you have a heart device such as an implantable cardioverter defibrillator (ICD) or pacemaker, talk to your doctor about turning it off. Include this in your advance directive.  Ask a friend or family member to make decisions for you when you no longer can. Choose someone who knows you well and understands what makes life meaningful for you. Talk to this person about the kinds of treatments you want or don't want. Make sure this person understands your values and wishes.  You may decide to try life-supporting treatments for a limited time. This can help your doctor see if they will help. You

## 2025-06-03 NOTE — DISCHARGE SUMMARY
faecalis      BACTERIAL SUSCEPTIBILITY PANEL TRE (Preliminary)      ampicillin <=2 ug/mL Sensitive      nitrofurantoin <=16 ug/mL Sensitive      Penicillin G 4 ug/mL Sensitive      vancomycin 1 ug/mL Sensitive                                   All Labs from Last 24 Hrs:  Recent Results (from the past 24 hours)   CBC with Auto Differential    Collection Time: 06/03/25  3:45 AM   Result Value Ref Range    WBC 4.7 4.3 - 11.1 K/uL    RBC 3.87 (L) 4.05 - 5.2 M/uL    Hemoglobin 11.0 (L) 11.7 - 15.4 g/dL    Hematocrit 34.4 (L) 35.8 - 46.3 %    MCV 88.9 82.0 - 102.0 FL    MCH 28.4 26.1 - 32.9 PG    MCHC 32.0 31.4 - 35.0 g/dL    RDW 16.5 (H) 11.9 - 14.6 %    Platelets 122 (L) 150 - 450 K/uL    MPV 11.8 9.4 - 12.3 FL    nRBC 0.00 0.0 - 0.2 K/uL    Differential Type AUTOMATED      Neutrophils % 53.8 43.0 - 78.0 %    Lymphocytes % 22.9 13.0 - 44.0 %    Monocytes % 16.1 (H) 4.0 - 12.0 %    Eosinophils % 6.4 0.5 - 7.8 %    Basophils % 0.4 0.0 - 2.0 %    Immature Granulocytes % 0.4 0.0 - 5.0 %    Neutrophils Absolute 2.54 1.70 - 8.20 K/UL    Lymphocytes Absolute 1.08 0.50 - 4.60 K/UL    Monocytes Absolute 0.76 0.10 - 1.30 K/UL    Eosinophils Absolute 0.30 0.00 - 0.80 K/UL    Basophils Absolute 0.02 0.00 - 0.20 K/UL    Immature Granulocytes Absolute 0.02 0.0 - 0.5 K/UL   Basic Metabolic Panel    Collection Time: 06/03/25  3:45 AM   Result Value Ref Range    Sodium 142 136 - 145 mmol/L    Potassium 3.7 3.5 - 5.1 mmol/L    Chloride 103 98 - 107 mmol/L    CO2 26 20 - 29 mmol/L    Anion Gap 13 7 - 16 mmol/L    Glucose 110 (H) 70 - 99 mg/dL    BUN 46 (H) 8 - 23 MG/DL    Creatinine 1.96 (H) 0.60 - 1.10 MG/DL    Est, Glom Filt Rate 25 (L) >60 ml/min/1.73m2    Calcium 8.8 8.8 - 10.2 MG/DL       No results for input(s): \"COVID19\" in the last 72 hours.    Recent Vital Data:  Patient Vitals for the past 24 hrs:   Temp Pulse Resp BP SpO2   06/03/25 0844 -- 87 -- -- 94 %   06/03/25 0724 97.9 °F (36.6 °C) 93 14 135/75 95 %   06/02/25 2119 -- 74

## 2025-06-03 NOTE — CARE COORDINATION
CM note:    Chart reviewed for updates. It was discussed in IDR's that pt is discharging today. Pt's auth was denied by [employer]. Spoke with pt about arranging home health and she agreed. Home Health choice list provided to pt and spouse and they chose [facility]. Referral sent to [facility]. IMM letter given at bedside and explained to pt; signed copy scanned to medical records. Spouse at bedside will provide transport. CM will continue to follow up with d/c planning.     06/03/25 1108   Services At/After Discharge   Transition of Care Consult (CM Consult) Home Health   Internal Home Health No   Reason Outside Agency Chosen Script used patient chose alternate agency   Services At/After Discharge Home Health;PT   Aneta Resource Information Provided? No   Mode of Transport at Discharge Other (see comment)  (Spouse)   Confirm Follow Up Transport Family   Condition of Participation: Discharge Planning   The Plan for Transition of Care is related to the following treatment goals: Pt is discharging home with [facility] PT/OT/RN and Aide services   The Patient and/or Patient Representative was provided with a Choice of Provider? Patient   The Patient and/Or Patient Representative agree with the Discharge Plan? Yes   Freedom of Choice list was provided with basic dialogue that supports the patient's individualized plan of care/goals, treatment preferences, and shares the quality data associated with the providers?  Yes     NATHANIEL Villar

## 2025-06-03 NOTE — PROGRESS NOTES
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     ASSESSMENT:     REHAB RECOMMENDATIONS:   Recommendation to date pending progress:  Setting:  Short-term Rehab    Equipment:    To Be Determined     ASSESSMENT:  Ms. Hahn is a 79 year old female who presents with above diagnosis.  Pt reports increased difficulty with mobility recently due to B LE edema.  Pt lives in multi level home and has been unable to ambulate stairs to her bedroom.  Spouse limited assistance due to his 
ACUTE PHYSICAL THERAPY GOALS:   (Developed with and agreed upon by patient and/or caregiver.)    (1.) Krissy Hahn  will move from supine to sit and sit to supine  with STAND BY ASSIST within 7 treatment day(s).    (2.) Krissy Hahn will transfer from bed to chair and chair to bed with STAND BY ASSIST using the least restrictive device within 7 treatment day(s).    (3.) Krissy Hahn will ambulate with CONTACT GUARD ASSIST for 50 feet with the least restrictive device within 7 treatment day(s).   (4.) Krissy Hahn will perform standing static and dynamic balance activities x 15 minutes with CONTACT GUARD ASSIST to improve safety within 7 treatment day(s).  (5.)  Krissy Hahn will perform therapeutic exercises for HEP with SUPERVISION to improve strength, endurance, and functional mobility within 7 treatment day(s).     PHYSICAL THERAPY Daily Note and AM  (Link to Caseload Tracking: PT Visit Days : 2  Acknowledge Orders  Time In/Out  PT Charge Capture  Rehab Caseload Tracker    Krissy Hahn is a 79 y.o. female   PRIMARY DIAGNOSIS: Acute on chronic diastolic congestive heart failure (HCC)  Acute pulmonary edema (HCC) [J81.0]  Acute on chronic diastolic congestive heart failure (HCC) [I50.33]       Reason for Referral: Generalized Muscle Weakness (M62.81)  Difficulty in walking, Not elsewhere classified (R26.2)  Other abnormalities of gait and mobility (R26.89)  Inpatient: Payor: [facility] MEDICARE / Plan: [facility] MEDICARE PPO / Product Type: *No Product type* /     ASSESSMENT:     REHAB RECOMMENDATIONS:   Recommendation to date pending progress:  Setting:  Short-term Rehab    Equipment:    To Be Determined     ASSESSMENT:  Ms. Hahn is a 79 year old female who presents with above diagnosis.  Pt reports increased difficulty with mobility recently due to B LE edema.  Pt lives in multi level home and has been unable to ambulate stairs to her bedroom.  Spouse limited assistance due to his 
ACUTE PHYSICAL THERAPY GOALS:   (Developed with and agreed upon by patient and/or caregiver.)    (1.) Krissy Hahn  will move from supine to sit and sit to supine  with STAND BY ASSIST within 7 treatment day(s).    (2.) Krissy Hahn will transfer from bed to chair and chair to bed with STAND BY ASSIST using the least restrictive device within 7 treatment day(s).    (3.) Krissy Hahn will ambulate with CONTACT GUARD ASSIST for 50 feet with the least restrictive device within 7 treatment day(s).   (4.) Krissy Hahn will perform standing static and dynamic balance activities x 15 minutes with CONTACT GUARD ASSIST to improve safety within 7 treatment day(s).  (5.)  Krissy Hahn will perform therapeutic exercises for HEP with SUPERVISION to improve strength, endurance, and functional mobility within 7 treatment day(s).     PHYSICAL THERAPY Daily Note and AM  (Link to Caseload Tracking: PT Visit Days : 3  Acknowledge Orders  Time In/Out  PT Charge Capture  Rehab Caseload Tracker    Krissy Hahn is a 79 y.o. female   PRIMARY DIAGNOSIS: Acute on chronic diastolic congestive heart failure (HCC)  Acute pulmonary edema (HCC) [J81.0]  Acute on chronic diastolic congestive heart failure (HCC) [I50.33]       Reason for Referral: Generalized Muscle Weakness (M62.81)  Difficulty in walking, Not elsewhere classified (R26.2)  Other abnormalities of gait and mobility (R26.89)  Inpatient: Payor: [facility] MEDICARE / Plan: [employer] [place] MEDICARE PPO / Product Type: *No Product type* /     ASSESSMENT:     REHAB RECOMMENDATIONS:   Recommendation to date pending progress:  Setting:  Short-term Rehab    Equipment:    To Be Determined     ASSESSMENT:  Ms. Hahn is a 79 year old female who presents with above diagnosis.  Pt reports increased difficulty with mobility recently due to B LE edema.  Pt lives in multi level home and has been unable to ambulate stairs to her bedroom.  Spouse limited assistance due to his 
ACUTE PHYSICAL THERAPY GOALS:   (Developed with and agreed upon by patient and/or caregiver.)    (1.) Krissy Hahn  will move from supine to sit and sit to supine  with STAND BY ASSIST within 7 treatment day(s).    (2.) Krissy Hahn will transfer from bed to chair and chair to bed with STAND BY ASSIST using the least restrictive device within 7 treatment day(s).    (3.) Krissy Hahn will ambulate with CONTACT GUARD ASSIST for 50 feet with the least restrictive device within 7 treatment day(s).   (4.) Krissy Hahn will perform standing static and dynamic balance activities x 15 minutes with CONTACT GUARD ASSIST to improve safety within 7 treatment day(s).  (5.)  Krissy Hahn will perform therapeutic exercises for HEP with SUPERVISION to improve strength, endurance, and functional mobility within 7 treatment day(s).     PHYSICAL THERAPY Daily Note and AM  (Link to Caseload Tracking: PT Visit Days : 4  Acknowledge Orders  Time In/Out  PT Charge Capture  Rehab Caseload Tracker    Krissy Hahn is a 79 y.o. female   PRIMARY DIAGNOSIS: Acute on chronic diastolic congestive heart failure (HCC)  Acute pulmonary edema (HCC) [J81.0]  Acute on chronic diastolic congestive heart failure (HCC) [I50.33]       Reason for Referral: Generalized Muscle Weakness (M62.81)  Difficulty in walking, Not elsewhere classified (R26.2)  Other abnormalities of gait and mobility (R26.89)  Inpatient: Payor: [facility] MEDICARE / Plan: [facility] MEDICARE PPO / Product Type: *No Product type* /     ASSESSMENT:     REHAB RECOMMENDATIONS:   Recommendation to date pending progress:  Setting:  Short-term Rehab    Equipment:    To Be Determined     ASSESSMENT:  Ms. Hahn is a 79 year old female who presents with above diagnosis.  Pt reports increased difficulty with mobility recently due to B LE edema.  Pt lives in multi level home and has been unable to ambulate stairs to her bedroom.  Spouse limited assistance due to his 
ACUTE PHYSICAL THERAPY GOALS:   (Developed with and agreed upon by patient and/or caregiver.)    (1.) Krissy Hahn  will move from supine to sit and sit to supine  with STAND BY ASSIST within 7 treatment day(s).    (2.) Krissy Hahn will transfer from bed to chair and chair to bed with STAND BY ASSIST using the least restrictive device within 7 treatment day(s).    (3.) Krissy Hahn will ambulate with CONTACT GUARD ASSIST for 50 feet with the least restrictive device within 7 treatment day(s).   (4.) Krissy Hahn will perform standing static and dynamic balance activities x 15 minutes with CONTACT GUARD ASSIST to improve safety within 7 treatment day(s).  (5.)  Krissy Hahn will perform therapeutic exercises for HEP with SUPERVISION to improve strength, endurance, and functional mobility within 7 treatment day(s).     PHYSICAL THERAPY Daily Note and PM  (Link to Caseload Tracking: PT Visit Days : 2  Acknowledge Orders  Time In/Out  PT Charge Capture  Rehab Caseload Tracker    Krissy Hahn is a 79 y.o. female   PRIMARY DIAGNOSIS: Acute on chronic diastolic congestive heart failure (HCC)  Acute pulmonary edema (HCC) [J81.0]  Acute on chronic diastolic congestive heart failure (HCC) [I50.33]       Reason for Referral: Generalized Muscle Weakness (M62.81)  Difficulty in walking, Not elsewhere classified (R26.2)  Other abnormalities of gait and mobility (R26.89)  Inpatient: Payor: [facility] MEDICARE / Plan: [facility] MEDICARE PPO / Product Type: *No Product type* /     ASSESSMENT:     REHAB RECOMMENDATIONS:   Recommendation to date pending progress:  Setting:  Short-term Rehab    Equipment:    To Be Determined     ASSESSMENT:  Ms. Hahn is a 79 year old female who presents with above diagnosis.  Pt reports increased difficulty with mobility recently due to B LE edema.  Pt lives in multi level home and has been unable to ambulate stairs to her bedroom.  Spouse limited assistance due to his 
ACUTE PHYSICAL THERAPY GOALS:   (Developed with and agreed upon by patient and/or caregiver.)    (1.) Krissy Hahn  will move from supine to sit and sit to supine  with STAND BY ASSIST within 7 treatment day(s).    (2.) Krissy Hahn will transfer from bed to chair and chair to bed with STAND BY ASSIST using the least restrictive device within 7 treatment day(s).    (3.) Krissy Hahn will ambulate with CONTACT GUARD ASSIST for 50 feet with the least restrictive device within 7 treatment day(s).   (4.) Krissy Hahn will perform standing static and dynamic balance activities x 15 minutes with CONTACT GUARD ASSIST to improve safety within 7 treatment day(s).  (5.)  Krissy Hahn will perform therapeutic exercises for HEP with SUPERVISION to improve strength, endurance, and functional mobility within 7 treatment day(s).     PHYSICAL THERAPY Initial Assessment and AM  (Link to Caseload Tracking: PT Visit Days : 1  Acknowledge Orders  Time In/Out  PT Charge Capture  Rehab Caseload Tracker    Krissy Hahn is a 79 y.o. female   PRIMARY DIAGNOSIS: Acute on chronic diastolic congestive heart failure (HCC)  Acute pulmonary edema (HCC) [J81.0]  Acute on chronic diastolic congestive heart failure (HCC) [I50.33]       Reason for Referral: Generalized Muscle Weakness (M62.81)  Difficulty in walking, Not elsewhere classified (R26.2)  Other abnormalities of gait and mobility (R26.89)  Inpatient: Payor: [facility] MEDICARE / Plan: [facility] MEDICARE PPO / Product Type: *No Product type* /     ASSESSMENT:     REHAB RECOMMENDATIONS:   Recommendation to date pending progress:  Setting:  Short-term Rehab    Equipment:    To Be Determined     ASSESSMENT:  Ms. Hahn is a 79 year old female who presents with above diagnosis.  Pt reports increased difficulty with mobility recently due to B LE edema.  Pt lives in multi level home and has been unable to ambulate stairs to her bedroom.  Spouse limited assistance due 
ICU rover consulted for ultrasound guided iv placement  
Patient agreeable to wearing hospital CPAP, however wishes to be out on around 0000. Encouraged patient to call with any needs or concerns in the meantime.  
Respiratory treatments complete. Patient tolerated well. Patient wishes to remain off of BIPAP for tonight as she states she is going home tomorrow and wishes to not wear it now. Encouraged patient to call out with any needs.   
SPEECH LANGUAGE PATHOLOGY: DYSPHAGIA Initial Assessment and Discharge    Acknowledge Order  I  Therapy Time  I   Charges     I  Rehab Caseload Tracker      NAME: Krissy Hahn  : 1945  MRN: [id number]    ADMISSION DATE: 2025  PRIMARY DIAGNOSIS: Acute on chronic diastolic congestive heart failure (HCC)    ICD-10: Treatment Diagnosis: R13.12 Dysphagia, Oropharyngeal Phase    RECOMMENDATIONS   Diet:    Regular Consistency  Thin Liquids    Medication: as tolerated   Compensatory Swallowing Strategies:   Upright as possible for all oral intake   Therapeutic Intervention:   Patient/family education  No additional speech therapy intervention indicated at this time.    Patient continues to require skilled intervention:  No. Please re-consult if new concerns arise.      Anticipated Discharge Needs: No additional speech therapy is indicated.      ASSESSMENT    Patient presents with oropharyngeal swallow function that is within normal limits. No signs or symptoms of dysphagia identified with thin liquids, puree, mixed consistency, or coarse solids. Additionally, patient denies any symptoms related to dysphagia or airway compromise. She did exhibit a single cough during session that was unrelated to po intake. Patient also stating cough is consistent with the baseline cough that contributed to her admission.     Recommend continue with regular diet and thin liquids. Medications with liquid wash. No additional speech therapy indicated at this time.     GENERAL    Subjective: Patient alert and oriented x4. Denies any oropharyngeal dysphagia symptoms. States she often avoids \"heavy foods at night because they don't digest well\". + history of GERD.     Reason for Consult:  coughing with po medication    History of Present Injury/Illness: Ms. Hahn  has a past medical history of Amyloidosis ([facility]), Arthritis, Asthma, Autoimmune disease, Chronic obstructive pulmonary disease (HCC), Dizziness, GERD (gastroesophageal 
This RN assigned in role of tech providing care.   
Urine sent to lab  
     COGNITION/  PERCEPTION: INTACT IMPAIRED   (See Comments)   Orientation [x]     Vision [x]     Hearing [x]     Cognition  [x]     Perception [x]       MOBILITY: I Mod I S SBA CGA Min Mod Max Total  NT x2 Comments:   Bed Mobility    Rolling [] [] [] [] [] [] [] [] [] [] []    Supine to Sit [] [] [] [x] [x] [] [] [] [] [] []    Scooting [] [] [] [x] [x] [] [] [] [] [] []    Sit to Supine [] [] [] [] [] [] [] [] [] [] []    Transfers    Sit to Stand [] [] [] [] [] [x] [] [] [] [] []    Bed to Chair [] [] [] [] [] [x] [] [] [] [] []    Stand to Sit [] [] [] [] [x] [] [] [] [] [] []    Tub/Shower [] [] [] [] [] [] [] [] [] [] []     Toilet [] [] [] [] [] [] [] [] [] [] []      [] [] [] [] [] [] [] [] [] [] []    I=Independent, Mod I=Modified Independent, S=Supervision/Setup, SBA=Standby Assistance, CGA=Contact Guard Assistance, Min=Minimal Assistance, Mod=Moderate Assistance, Max=Maximal Assistance, Total=Total Assistance, NT=Not Tested    ACTIVITIES OF DAILY LIVING: I Mod I S SBA CGA Min Mod Max Total NT Comments   BASIC ADLs:              Upper Body Bathing  [] [] [] [] [] [] [] [] [] []     Lower Body Bathing [] [] [] [] [] [] [] [] [] []     Toileting [] [] [] [] [] [] [] [] [] []    Upper Body Dressing [] [] [] [] [] [] [] [] [] []    Lower Body Dressing [] [] [] [] [] [] [] [x] [] [] Donned and doffed socks    Feeding [] [] [x] [] [] [] [] [] [] []    Grooming [] [] [] [x] [] [] [] [] [] [] Seated- brushed teeth    Personal Device Care [] [] [] [] [] [] [] [] [] []    Functional Mobility [] [] [] [] [] [x] [] [] [] [] With rolling walker pivot to chair    I=Independent, Mod I=Modified Independent, S=Supervision/Setup, SBA=Standby Assistance, CGA=Contact Guard Assistance, Min=Minimal Assistance, Mod=Moderate Assistance, Max=Maximal Assistance, Total=Total Assistance, NT=Not Tested      PLAN:   FREQUENCY/DURATION   OT Plan of Care: 3 times/week for duration of hospital stay or until stated goals are met, 
   Eosinophils % 5.2 0.5 - 7.8 %    Basophils % 0.7 0.0 - 2.0 %    Immature Granulocytes % 0.3 0.0 - 5.0 %    Neutrophils Absolute 3.98 1.70 - 8.20 K/UL    Lymphocytes Absolute 0.89 0.50 - 4.60 K/UL    Monocytes Absolute 0.59 0.10 - 1.30 K/UL    Eosinophils Absolute 0.30 0.00 - 0.80 K/UL    Basophils Absolute 0.04 0.00 - 0.20 K/UL    Immature Granulocytes Absolute 0.02 0.0 - 0.5 K/UL       No results for input(s): \"COVID19\" in the last 72 hours.    Current Meds:  Current Facility-Administered Medications   Medication Dose Route Frequency    budesonide (PULMICORT) nebulizer suspension 500 mcg  0.5 mg Nebulization BID RT    And    arformoterol tartrate (BROVANA) nebulizer solution 15 mcg  15 mcg Nebulization BID RT    And    ipratropium (ATROVENT) 0.02 % nebulizer solution 0.5 mg  0.5 mg Nebulization BID RT    bumetanide (BUMEX) tablet 1 mg  1 mg Oral BID    enoxaparin Sodium (LOVENOX) injection 30 mg  30 mg SubCUTAneous Daily    sodium chloride flush 0.9 % injection 5-40 mL  5-40 mL IntraVENous 2 times per day    sodium chloride flush 0.9 % injection 5-40 mL  5-40 mL IntraVENous PRN    0.9 % sodium chloride infusion   IntraVENous PRN    ondansetron (ZOFRAN-ODT) disintegrating tablet 4 mg  4 mg Oral Q6H PRN    Or    ondansetron (ZOFRAN) injection 4 mg  4 mg IntraVENous Q6H PRN    acetaminophen (TYLENOL) tablet 650 mg  650 mg Oral Q6H PRN    Or    acetaminophen (TYLENOL) suppository 650 mg  650 mg Rectal Q6H PRN    potassium chloride (KLOR-CON M) extended release tablet 40 mEq  40 mEq Oral PRN    Or    potassium bicarb-citric acid (EFFER-K) effervescent tablet 40 mEq  40 mEq Oral PRN    Or    potassium chloride 10 mEq/100 mL IVPB (Peripheral Line)  10 mEq IntraVENous Daily PRN    magnesium sulfate 2000 mg in 50 mL IVPB premix  2,000 mg IntraVENous PRN    senna (SENOKOT) tablet 8.6 mg  1 tablet Oral Daily PRN    aspirin chewable tablet 81 mg  81 mg Oral Daily    levETIRAcetam (KEPPRA) tablet 750 mg  750 mg Oral BID    
- 5.1 mmol/L    Chloride 103 98 - 107 mmol/L    CO2 26 20 - 29 mmol/L    Anion Gap 13 7 - 16 mmol/L    Glucose 101 (H) 70 - 99 mg/dL    BUN 44 (H) 8 - 23 MG/DL    Creatinine 1.79 (H) 0.60 - 1.10 MG/DL    Est, Glom Filt Rate 28 (L) >60 ml/min/1.73m2    Calcium 9.0 8.8 - 10.2 MG/DL   Magnesium    Collection Time: 05/31/25  4:23 AM   Result Value Ref Range    Magnesium 1.8 1.8 - 2.4 mg/dL   CBC with Auto Differential    Collection Time: 05/31/25  4:23 AM   Result Value Ref Range    WBC 5.1 4.3 - 11.1 K/uL    RBC 3.90 (L) 4.05 - 5.2 M/uL    Hemoglobin 11.4 (L) 11.7 - 15.4 g/dL    Hematocrit 34.9 (L) 35.8 - 46.3 %    MCV 89.5 82.0 - 102.0 FL    MCH 29.2 26.1 - 32.9 PG    MCHC 32.7 31.4 - 35.0 g/dL    RDW 16.7 (H) 11.9 - 14.6 %    Platelets 112 (L) 150 - 450 K/uL    MPV 11.9 9.4 - 12.3 FL    nRBC 0.00 0.0 - 0.2 K/uL    Differential Type AUTOMATED      Neutrophils % 59.6 43.0 - 78.0 %    Lymphocytes % 18.0 13.0 - 44.0 %    Monocytes % 14.3 (H) 4.0 - 12.0 %    Eosinophils % 6.9 0.5 - 7.8 %    Basophils % 0.8 0.0 - 2.0 %    Immature Granulocytes % 0.4 0.0 - 5.0 %    Neutrophils Absolute 3.04 1.70 - 8.20 K/UL    Lymphocytes Absolute 0.92 0.50 - 4.60 K/UL    Monocytes Absolute 0.73 0.10 - 1.30 K/UL    Eosinophils Absolute 0.35 0.00 - 0.80 K/UL    Basophils Absolute 0.04 0.00 - 0.20 K/UL    Immature Granulocytes Absolute 0.02 0.0 - 0.5 K/UL       No results for input(s): \"COVID19\" in the last 72 hours.    Current Meds:  Current Facility-Administered Medications   Medication Dose Route Frequency    cefTRIAXone (ROCEPHIN) 1,000 mg in sterile water 10 mL IV syringe  1,000 mg IntraVENous Q24H    budesonide (PULMICORT) nebulizer suspension 500 mcg  0.5 mg Nebulization BID RT    And    arformoterol tartrate (BROVANA) nebulizer solution 15 mcg  15 mcg Nebulization BID RT    And    ipratropium (ATROVENT) 0.02 % nebulizer solution 0.5 mg  0.5 mg Nebulization BID RT    rosuvastatin (CRESTOR) tablet 10 mg  10 mg Oral QPM    bumetanide 
   HYDROcodone homatropine (HYCODAN) 5-1.5 MG/5ML solution 5 mL  5 mL Oral Q6H PRN       Signed:  Carolyn Guillory MD    Part of this note may have been written by using a voice dictation software.  The note has been proof read but may still contain some grammatical/other typographical errors.  
Patient requests all Lab, Cardiology, and Radiology Results on their Discharge Instructions

## 2025-06-04 ENCOUNTER — FOLLOWUP TELEPHONE ENCOUNTER (OUTPATIENT)
Dept: CASE MANAGEMENT | Age: 80
End: 2025-06-04

## 2025-06-04 ENCOUNTER — TELEPHONE (OUTPATIENT)
Age: 80
End: 2025-06-04

## 2025-06-04 NOTE — TELEPHONE ENCOUNTER
Patient was in the hospital with fluid build up around lungs and was release and to limit fluids. Patient wants to know if she is to continue this and for how long.

## 2025-06-04 NOTE — PROGRESS NOTES
This RN Navigator attempted to reach patient by phone for post-discharge follow up. No answer, VM left asking patient to call this RN Navigator back. Will attempt to reach patient again.     This RN Navigator will continue to follow.

## 2025-06-05 NOTE — TELEPHONE ENCOUNTER
Hospitalized in [facility] 5/26/25-6/3/25 with acute on chronic diastolic CHF.   Was told to limit fluids to 1800 cc per day.   No LE edema.   Continued SOB when waking from room to room to room in house, but no increase in SOB.   Has not been weighing at home.   Does not have BP or HR readings.  Taking ASA 81 mg qd, Bumex 1 mg BID, and Crestor 40 mg qd.   States she was told to schedule FU appointment with ROYA Sanz.     Patient requests FU appointment with ROYA Sanz. She asks if she still needs to follow 1800 cc/day fluid restriction and if she should be taking potassium.    Scheduled next available afternoon MA appointment with Dr. Gil on 7/24/25 at 1:30 pm. Patient declined earlier appointments at , stating she cannot travel far from home with lymphedema. She asks for earlier appointment with Dr. Gil, if possible.  Advised patient that I will notify Dr. Gil of above when she returns to office, tomorrow, and call with her response. Patient verbalized understanding.

## 2025-06-05 NOTE — TELEPHONE ENCOUNTER
Per Betty at , scheduled MA appointment with Dr. Gil on 6/9/25 at 1:00 pm. Cancelled 7/24/25 appointment with Dr. Gil. Advised patient that I will call back, tomorrow, with Dr. Gil's recommendations for fluid restriction and Kcl. Patient verbalized understanding.

## 2025-06-06 ENCOUNTER — TELEPHONE (OUTPATIENT)
Age: 80
End: 2025-06-06

## 2025-06-06 LAB
BACTERIA SPEC CULT: ABNORMAL
SERVICE CMNT-IMP: ABNORMAL

## 2025-06-06 NOTE — PROGRESS NOTES
03/05/25 132/62     Pulse Readings from Last 3 Encounters:   06/09/25 68   06/03/25 87   03/05/25 72       Physical Exam  Constitutional:       Appearance: Normal appearance.   HENT:      Head: Normocephalic and atraumatic.   Eyes:      Conjunctiva/sclera: Conjunctivae normal.   Neck:      Vascular: No carotid bruit.   Cardiovascular:      Rate and Rhythm: Normal rate and regular rhythm.      Heart sounds: Murmur heard.      Blowing early systolic murmur is present with a grade of 3/6 at the upper right sternal border radiating to the neck.      No friction rub. No gallop.   Pulmonary:      Effort: No respiratory distress.      Breath sounds: No wheezing or rales.   Musculoskeletal:         General: Swelling (chronic lymphedema 3+) present.      Cervical back: Neck supple.   Skin:     General: Skin is warm and dry.   Neurological:      General: No focal deficit present.      Mental Status: She is alert.   Psychiatric:         Mood and Affect: Mood normal.         Behavior: Behavior normal.         Medical problems and test results were reviewed with the patient today.     DATA REVIEW    LIPID PANEL     Lab Results   Component Value Date    CHOL 145 05/27/2025     Lab Results   Component Value Date    TRIG 68 05/27/2025     Lab Results   Component Value Date    HDL 52 05/27/2025     No components found for: \"LDLCHOLESTEROL\", \"LDLCALC\"  Lab Results   Component Value Date    VLDL 14 05/27/2025     Lab Results   Component Value Date    CHOLHDLRATIO 2.8 05/27/2025     Lab Results   Component Value Date    LDL 79 05/27/2025         BMP  Lab Results   Component Value Date/Time     06/03/2025 03:45 AM    K 3.7 06/03/2025 03:45 AM     06/03/2025 03:45 AM    CO2 26 06/03/2025 03:45 AM    BUN 46 06/03/2025 03:45 AM    CREATININE 1.96 06/03/2025 03:45 AM    GLUCOSE 110 06/03/2025 03:45 AM    CALCIUM 8.8 06/03/2025 03:45 AM      Lab Results   Component Value Date    WBC 4.7 06/03/2025    HGB 11.0 (L) 06/03/2025

## 2025-06-09 ENCOUNTER — FOLLOWUP TELEPHONE ENCOUNTER (OUTPATIENT)
Dept: CASE MANAGEMENT | Age: 80
End: 2025-06-09

## 2025-06-09 ENCOUNTER — OFFICE VISIT (OUTPATIENT)
Age: 80
End: 2025-06-09
Payer: MEDICARE

## 2025-06-09 VITALS
HEART RATE: 68 BPM | BODY MASS INDEX: 51.36 KG/M2 | DIASTOLIC BLOOD PRESSURE: 86 MMHG | WEIGHT: 263 LBS | SYSTOLIC BLOOD PRESSURE: 128 MMHG

## 2025-06-09 DIAGNOSIS — G47.33 OSA (OBSTRUCTIVE SLEEP APNEA): ICD-10-CM

## 2025-06-09 DIAGNOSIS — I34.2 NONRHEUMATIC MITRAL VALVE STENOSIS: ICD-10-CM

## 2025-06-09 DIAGNOSIS — T82.857A STENOSIS OF PROSTHETIC AORTIC VALVE, INITIAL ENCOUNTER: Primary | ICD-10-CM

## 2025-06-09 DIAGNOSIS — I50.32 CHRONIC DIASTOLIC CONGESTIVE HEART FAILURE (HCC): ICD-10-CM

## 2025-06-09 DIAGNOSIS — N18.32 STAGE 3B CHRONIC KIDNEY DISEASE (CKD) (HCC): ICD-10-CM

## 2025-06-09 DIAGNOSIS — E66.01 MORBID OBESITY (HCC): ICD-10-CM

## 2025-06-09 DIAGNOSIS — I10 ESSENTIAL HYPERTENSION: Chronic | ICD-10-CM

## 2025-06-09 PROCEDURE — 3074F SYST BP LT 130 MM HG: CPT | Performed by: INTERNAL MEDICINE

## 2025-06-09 PROCEDURE — 1160F RVW MEDS BY RX/DR IN RCRD: CPT | Performed by: INTERNAL MEDICINE

## 2025-06-09 PROCEDURE — 3079F DIAST BP 80-89 MM HG: CPT | Performed by: INTERNAL MEDICINE

## 2025-06-09 PROCEDURE — 1159F MED LIST DOCD IN RCRD: CPT | Performed by: INTERNAL MEDICINE

## 2025-06-09 PROCEDURE — 1123F ACP DISCUSS/DSCN MKR DOCD: CPT | Performed by: INTERNAL MEDICINE

## 2025-06-09 PROCEDURE — 99214 OFFICE O/P EST MOD 30 MIN: CPT | Performed by: INTERNAL MEDICINE

## 2025-06-09 RX ORDER — BUMETANIDE 1 MG/1
1 TABLET ORAL 2 TIMES DAILY
Qty: 90 TABLET | Refills: 1 | Status: SHIPPED | OUTPATIENT
Start: 2025-06-09

## 2025-06-09 ASSESSMENT — ENCOUNTER SYMPTOMS: SHORTNESS OF BREATH: 1

## 2025-06-09 NOTE — PROGRESS NOTES
This RN navigator spoke to patient by phone for follow-up. Patient states she is at her cardiology appointment and can't talk. Spoke with patient's . He states patient is doing much better since leaving the hospital and HH has come in to see her.     Patient's spouse denies symptoms of COPD exacerbation, trouble with breathing at this time.     Patient's spouse denies any needs at this time. Educated him to please call if a need arises.    This RN Navigator will continue to follow.

## 2025-06-10 ENCOUNTER — FOLLOWUP TELEPHONE ENCOUNTER (OUTPATIENT)
Dept: CASE MANAGEMENT | Age: 80
End: 2025-06-10

## 2025-06-10 NOTE — PROGRESS NOTES
This RN Navigator returned patient's voicemail. Patient asking for return phone call. No answer at phone number patient gave to call back, ULISES left advising patient this RN Navigator was returning her phone call. Will attempt to reach patient again.    This RN Navigator will continue to follow.

## 2025-06-11 ENCOUNTER — FOLLOWUP TELEPHONE ENCOUNTER (OUTPATIENT)
Dept: CASE MANAGEMENT | Age: 80
End: 2025-06-11

## 2025-06-13 ENCOUNTER — FOLLOWUP TELEPHONE ENCOUNTER (OUTPATIENT)
Dept: CASE MANAGEMENT | Age: 80
End: 2025-06-13

## 2025-06-17 NOTE — PROGRESS NOTES
Patient pre-assessment complete for MARYBETH scheduled for Dr Sun, arrival time 1000. Patient verified using . NPO status reinforced. Patient informed to take a full dose aspirin Patient instructed to HOLD Bumex. Instructed they can take all other medications excluding vitamins & supplements. Patient verbalizes understanding of all instructions & denies any questions at this time.

## 2025-06-17 NOTE — PROGRESS NOTES
Called to pre-assess for MARYBETH w/ Dr Schwab.  Scheduled arrival time 1000. No answer & detailed message left.  Told to HOLD Bumex.

## 2025-06-18 ENCOUNTER — TELEPHONE (OUTPATIENT)
Dept: CARDIOLOGY | Age: 80
End: 2025-06-18

## 2025-06-18 ENCOUNTER — HOSPITAL ENCOUNTER (OUTPATIENT)
Dept: CARDIAC CATH/INVASIVE PROCEDURES | Age: 80
Discharge: HOME | End: 2025-06-18
Attending: INTERNAL MEDICINE | Admitting: INTERNAL MEDICINE
Payer: MEDICARE

## 2025-06-18 VITALS
RESPIRATION RATE: 26 BRPM | DIASTOLIC BLOOD PRESSURE: 73 MMHG | WEIGHT: 263 LBS | TEMPERATURE: 98 F | OXYGEN SATURATION: 100 % | SYSTOLIC BLOOD PRESSURE: 121 MMHG | HEIGHT: 60 IN | BODY MASS INDEX: 51.63 KG/M2 | HEART RATE: 85 BPM

## 2025-06-18 DIAGNOSIS — I34.2 NONRHEUMATIC MITRAL VALVE STENOSIS: ICD-10-CM

## 2025-06-18 LAB
ECHO AV ACCELERATION TIME: 166 MS
ECHO AV AREA PEAK VELOCITY: 0.6 CM2
ECHO AV AREA VTI: 0.6 CM2
ECHO AV AREA/BSA PEAK VELOCITY: 0.3 CM2/M2
ECHO AV AREA/BSA VTI: 0.3 CM2/M2
ECHO AV MEAN GRADIENT: 40 MMHG
ECHO AV MEAN VELOCITY: 2.9 M/S
ECHO AV PEAK GRADIENT: 70 MMHG
ECHO AV PEAK GRADIENT: 70 MMHG
ECHO AV PEAK VELOCITY: 4.2 M/S
ECHO AV VELOCITY RATIO: 0.21
ECHO AV VTI: 110 CM
ECHO BSA: 2.25 M2
ECHO LVOT AREA: 2.8 CM2
ECHO LVOT AV VTI INDEX: 0.22
ECHO LVOT DIAM: 1.9 CM
ECHO LVOT MEAN GRADIENT: 2 MMHG
ECHO LVOT PEAK GRADIENT: 4 MMHG
ECHO LVOT PEAK VELOCITY: 0.9 M/S
ECHO LVOT STROKE VOLUME INDEX: 32.4 ML/M2
ECHO LVOT SV: 68 ML
ECHO LVOT VTI: 24 CM
ECHO MV AREA PLAN: 2 CM2
ECHO MV AREA PLAN: 2 CM2
EKG ATRIAL RATE: 93 BPM
EKG DIAGNOSIS: NORMAL
EKG P AXIS: 70 DEGREES
EKG P-R INTERVAL: 152 MS
EKG Q-T INTERVAL: 422 MS
EKG QRS DURATION: 152 MS
EKG QTC CALCULATION (BAZETT): 524 MS
EKG R AXIS: 18 DEGREES
EKG T AXIS: 185 DEGREES
EKG VENTRICULAR RATE: 93 BPM

## 2025-06-18 PROCEDURE — 93320 DOPPLER ECHO COMPLETE: CPT | Performed by: INTERNAL MEDICINE

## 2025-06-18 PROCEDURE — 99152 MOD SED SAME PHYS/QHP 5/>YRS: CPT

## 2025-06-18 PROCEDURE — 93320 DOPPLER ECHO COMPLETE: CPT

## 2025-06-18 PROCEDURE — 93319 3D ECHO IMG CGEN CAR ANOMAL: CPT | Performed by: INTERNAL MEDICINE

## 2025-06-18 PROCEDURE — 99152 MOD SED SAME PHYS/QHP 5/>YRS: CPT | Performed by: INTERNAL MEDICINE

## 2025-06-18 PROCEDURE — 6360000002 HC RX W HCPCS: Performed by: INTERNAL MEDICINE

## 2025-06-18 PROCEDURE — 93010 ELECTROCARDIOGRAM REPORT: CPT | Performed by: INTERNAL MEDICINE

## 2025-06-18 PROCEDURE — 93005 ELECTROCARDIOGRAM TRACING: CPT | Performed by: INTERNAL MEDICINE

## 2025-06-18 PROCEDURE — 93312 ECHO TRANSESOPHAGEAL: CPT | Performed by: INTERNAL MEDICINE

## 2025-06-18 PROCEDURE — 6370000000 HC RX 637 (ALT 250 FOR IP): Performed by: INTERNAL MEDICINE

## 2025-06-18 RX ORDER — LIDOCAINE HYDROCHLORIDE 20 MG/ML
SOLUTION OROPHARYNGEAL PRN
Status: COMPLETED | OUTPATIENT
Start: 2025-06-18 | End: 2025-06-18

## 2025-06-18 RX ORDER — MIDAZOLAM HYDROCHLORIDE 1 MG/ML
INJECTION, SOLUTION INTRAMUSCULAR; INTRAVENOUS PRN
Status: COMPLETED | OUTPATIENT
Start: 2025-06-18 | End: 2025-06-18

## 2025-06-18 RX ORDER — FENTANYL CITRATE 50 UG/ML
INJECTION, SOLUTION INTRAMUSCULAR; INTRAVENOUS PRN
Status: COMPLETED | OUTPATIENT
Start: 2025-06-18 | End: 2025-06-18

## 2025-06-18 RX ADMIN — MIDAZOLAM 1 MG: 1 INJECTION INTRAMUSCULAR; INTRAVENOUS at 11:24

## 2025-06-18 RX ADMIN — FENTANYL CITRATE 50 MCG: 50 INJECTION, SOLUTION INTRAMUSCULAR; INTRAVENOUS at 11:19

## 2025-06-18 RX ADMIN — MIDAZOLAM 1 MG: 1 INJECTION INTRAMUSCULAR; INTRAVENOUS at 11:22

## 2025-06-18 RX ADMIN — FENTANYL CITRATE 25 MCG: 50 INJECTION, SOLUTION INTRAMUSCULAR; INTRAVENOUS at 11:24

## 2025-06-18 RX ADMIN — LIDOCAINE HYDROCHLORIDE 15 ML: 20 SOLUTION ORAL at 11:00

## 2025-06-18 RX ADMIN — MIDAZOLAM 2 MG: 1 INJECTION INTRAMUSCULAR; INTRAVENOUS at 11:19

## 2025-06-18 NOTE — PROGRESS NOTES
Patient received to CPRU room # 16  Ambulatory from [facility]. Patient scheduled for MARYBETH today with Dr Schwab. Procedure reviewed & questions answered, voiced good understanding consent obtained & placed on chart. All medications and medical history reviewed. Will prep patient per orders. Patient & family updated on plan of care.      The patient has a fraility score of 6-MODERATELY FRAIL, based on wheelchair use.

## 2025-06-18 NOTE — PROGRESS NOTES
MARYBETH complete with   Sedation start 1118  Sedation end 1139  4mg of Versed  75mcg of Fentanyl used for sedation  Numbed with viscous lidocaine at 1100

## 2025-06-18 NOTE — DISCHARGE INSTRUCTIONS
AFTER YOUR TRANSESOPHAGEAL ECHOCARDIOGRAM    Be sure someone else drives you home. You may feel drowsy for several hours.    Do not eat or drink for at least two hours 1 PM after your procedure. Your throat will be numb and there is a risk you might have difficulty swallowing for a while. Be careful when you do eat or drink for the first time especially with hot fluids since you could easily burn your throat.    Call your doctor if:    You are bleeding from your throat or mouth.  You have trouble breathing all of a sudden.  You have chest pain or any pain that spreads to your neck, jaw, or arms.  You have questions or concerns.  You have a fever greater than 101°F.

## 2025-06-19 ENCOUNTER — TELEPHONE (OUTPATIENT)
Age: 80
End: 2025-06-19

## 2025-06-19 NOTE — TELEPHONE ENCOUNTER
Pt called & said she is confused one what medication she should take.     Dr Villalobos prescribed  (bumetanide (BUMEX) 1 MG tablet)     Dr Martini prescribed  (bumetanide (BUMEX) 2 MG tablet )     She doesn't know which one to take.

## 2025-06-22 ENCOUNTER — RESULTS FOLLOW-UP (OUTPATIENT)
Dept: CARDIOLOGY | Age: 80
End: 2025-06-22

## 2025-06-23 ENCOUNTER — TELEPHONE (OUTPATIENT)
Age: 80
End: 2025-06-23

## 2025-06-23 NOTE — TELEPHONE ENCOUNTER
Pt is calling b/c Jeannine Carlton called her to setup a TAVR consult with Dr. Del Rio. Pt said she did not remember discussing having this done and wants some more info.

## 2025-06-23 NOTE — TELEPHONE ENCOUNTER
Called and spoke with patient and advised TAVR was discussed after MARYBETH. Her upcoming appointment is scheduled to discuss options. Voiced understanding.

## 2025-06-24 ENCOUNTER — TELEPHONE (OUTPATIENT)
Age: 80
End: 2025-06-24

## 2025-06-24 NOTE — TELEPHONE ENCOUNTER
Pts daughter states pt had a venkata last Wednesday and states pts throat is so sore she can barely eat or drink

## 2025-06-24 NOTE — TELEPHONE ENCOUNTER
Advised patient to go to urgent care for evaluation, if she is having a lot of discomfort and difficulty swallowing. Advised patient to go to [facility] ER, if discomfort is severe. Patient verbalized understanding, but states she now only has mild discomfort when swallowing food and is currently sipping water without pain or difficulty. Advised patient that I will notify Dr. Gil of above when she returns to office, tomorrow, and call with her response. Patient verbalized understanding.

## 2025-06-24 NOTE — TELEPHONE ENCOUNTER
Left message on voicemail with Dr. Schwab' response. In message, advised patient that I will call, tomorrow.

## 2025-06-24 NOTE — TELEPHONE ENCOUNTER
Ankit Schwab MD Keener, Lynn F, RN  Caller: Unspecified (Today,  3:58 PM)  Agree with recommendations to go to the ER if significant symptoms.  If mild, can continue watching .  Procedure with MARYBETH was not complicated so low likelihood of complications.  If fevers noted, also proceed to the ED

## 2025-06-24 NOTE — TELEPHONE ENCOUNTER
Difficulty swallowing due to bad pain in throat when he eats and drinks since 6/18/25 MARYBETH.  Eating and drinking very little. Has tried to eat frozen yogurt milkshakes, low sodium soup, and watermelon.  Throat pain is worse than before, today.   Feels lump in esophagus, down in chest.   Dry cough.   Afebrile.   Taking ASA 81 mg, Bumex 1 mg BID, KCL 10 mEq qd, and Crestor 40 mg qd.     Patient asks for recommendations for throat pain after MARYBETH.

## 2025-06-25 NOTE — TELEPHONE ENCOUNTER
Advised patient of Dr. Schwab' response. Advised patient to call with any further questions or concerns. Patient verbalized understanding.

## 2025-06-30 PROBLEM — N39.0 UTI (URINARY TRACT INFECTION): Status: RESOLVED | Noted: 2025-05-31 | Resolved: 2025-06-30

## 2025-07-03 ENCOUNTER — FOLLOWUP TELEPHONE ENCOUNTER (OUTPATIENT)
Dept: CASE MANAGEMENT | Age: 80
End: 2025-07-03

## 2025-07-03 NOTE — PROGRESS NOTES
Patient returned this RN Navigator's phone call for weekly follow up. Patient states she is doing ok. States HH is continuing services with her. Patient reports she is still trying to find someone to come in to do some light housekeeping. Patient states she is SOB often and states her PCP gave her medication to help her cough. Denies wheezing. She states HH checks her oxygen and her oxygen level has remained WNL. Patient states she is trying to reach her Pulmonologist at [facility] for a new CPAP. Advised patient to call them again today if she has not heard back in the next couple of hours.    Patient denies any needs from this RN Navigator in obtaining or refilling medications. Denies any needs from this RN Navigator at this time. Educated patient to please contact this RN Navigator if a need arises. Patient states understanding.    This RN Navigator will continue to follow until 7/4/2025.

## 2025-07-03 NOTE — PROGRESS NOTES
This RN Navigator attempted to reach patient by phone for weekly follow up. No answer, VM left asking patient to call this RN Navigator back.     This RN Navigator will continue to follow until 7/4/2025.

## 2025-07-07 ENCOUNTER — FOLLOWUP TELEPHONE ENCOUNTER (OUTPATIENT)
Dept: CASE MANAGEMENT | Age: 80
End: 2025-07-07

## 2025-07-16 NOTE — PROGRESS NOTES
Encounters:   07/17/25 112/64   06/18/25 121/73   06/09/25 128/86     Pulse Readings from Last 3 Encounters:   07/17/25 84   06/18/25 85   06/09/25 68           Physical Exam  Constitutional:       Appearance: Normal appearance.   HENT:      Head: Normocephalic and atraumatic.   Eyes:      Conjunctiva/sclera: Conjunctivae normal.   Neck:      Vascular: No carotid bruit.   Cardiovascular:      Rate and Rhythm: Normal rate and regular rhythm.      Heart sounds: Murmur heard.      Blowing early systolic murmur is present with a grade of 2/6 at the upper right sternal border.      No friction rub. No gallop.   Pulmonary:      Effort: No respiratory distress.      Breath sounds: No wheezing or rales.   Abdominal:      Comments: Morbidly obese   Musculoskeletal:         General: No swelling.      Cervical back: Neck supple.   Skin:     General: Skin is warm and dry.   Neurological:      General: No focal deficit present.      Mental Status: She is alert.   Psychiatric:         Mood and Affect: Mood normal.         Behavior: Behavior normal.         Medical problems and test results were reviewed with the patient today.     DATA REVIEW    LIPID PANEL     Lab Results   Component Value Date    CHOL 145 05/27/2025     Lab Results   Component Value Date    TRIG 68 05/27/2025     Lab Results   Component Value Date    HDL 52 05/27/2025     No components found for: \"LDLCHOLESTEROL\", \"LDLCALC\"  Lab Results   Component Value Date    VLDL 14 05/27/2025     Lab Results   Component Value Date    CHOLHDLRATIO 2.8 05/27/2025     Lab Results   Component Value Date    LDL 79 05/27/2025         BMP  Lab Results   Component Value Date/Time     06/03/2025 03:45 AM    K 3.7 06/03/2025 03:45 AM     06/03/2025 03:45 AM    CO2 26 06/03/2025 03:45 AM    BUN 46 06/03/2025 03:45 AM    CREATININE 1.96 06/03/2025 03:45 AM    GLUCOSE 110 06/03/2025 03:45 AM    CALCIUM 8.8 06/03/2025 03:45 AM          EKG        CXR/IMAGING        DEVICE

## 2025-07-17 ENCOUNTER — OFFICE VISIT (OUTPATIENT)
Age: 80
End: 2025-07-17
Payer: MEDICARE

## 2025-07-17 VITALS — DIASTOLIC BLOOD PRESSURE: 64 MMHG | SYSTOLIC BLOOD PRESSURE: 112 MMHG | HEART RATE: 84 BPM

## 2025-07-17 DIAGNOSIS — I50.32 CHRONIC DIASTOLIC CONGESTIVE HEART FAILURE (HCC): ICD-10-CM

## 2025-07-17 DIAGNOSIS — I10 ESSENTIAL HYPERTENSION: Chronic | ICD-10-CM

## 2025-07-17 DIAGNOSIS — E66.01 MORBID OBESITY (HCC): ICD-10-CM

## 2025-07-17 DIAGNOSIS — T82.857D STENOSIS OF PROSTHETIC AORTIC VALVE, SUBSEQUENT ENCOUNTER: Primary | ICD-10-CM

## 2025-07-17 PROCEDURE — 1160F RVW MEDS BY RX/DR IN RCRD: CPT | Performed by: INTERNAL MEDICINE

## 2025-07-17 PROCEDURE — 99214 OFFICE O/P EST MOD 30 MIN: CPT | Performed by: INTERNAL MEDICINE

## 2025-07-17 PROCEDURE — 3074F SYST BP LT 130 MM HG: CPT | Performed by: INTERNAL MEDICINE

## 2025-07-17 PROCEDURE — 3078F DIAST BP <80 MM HG: CPT | Performed by: INTERNAL MEDICINE

## 2025-07-17 PROCEDURE — 1123F ACP DISCUSS/DSCN MKR DOCD: CPT | Performed by: INTERNAL MEDICINE

## 2025-07-17 PROCEDURE — 1126F AMNT PAIN NOTED NONE PRSNT: CPT | Performed by: INTERNAL MEDICINE

## 2025-07-17 PROCEDURE — 1159F MED LIST DOCD IN RCRD: CPT | Performed by: INTERNAL MEDICINE

## 2025-07-17 RX ORDER — BUMETANIDE 1 MG/1
TABLET ORAL
Qty: 90 TABLET | Refills: 7 | OUTPATIENT
Start: 2025-07-17

## 2025-07-17 RX ORDER — BUMETANIDE 2 MG/1
1 TABLET ORAL 2 TIMES DAILY
Qty: 60 TABLET | Refills: 1 | Status: SHIPPED | OUTPATIENT
Start: 2025-07-17 | End: 2025-11-14

## 2025-07-17 ASSESSMENT — ENCOUNTER SYMPTOMS: SHORTNESS OF BREATH: 1

## 2025-07-17 NOTE — TELEPHONE ENCOUNTER
Last OV 6/9/2025  RX verified last encounter    Requested Prescriptions     Pending Prescriptions Disp Refills    bumetanide (BUMEX) 1 MG tablet [Pharmacy Med Name: Bumetanide 1 MG Oral Tablet] 90 tablet 7     Sig: TAKE 1 TABLET BY MOUTH IN THE  MORNING AND EVENING

## 2025-07-18 ENCOUNTER — TELEPHONE (OUTPATIENT)
Age: 80
End: 2025-07-18

## 2025-07-18 NOTE — TELEPHONE ENCOUNTER
Pt is calling asking if it is ok for her to take a womens over 50 vitamin  Please call pt  to let her know

## 2025-07-21 ENCOUNTER — TELEPHONE (OUTPATIENT)
Age: 80
End: 2025-07-21

## 2025-07-21 ENCOUNTER — OFFICE VISIT (OUTPATIENT)
Age: 80
End: 2025-07-21
Payer: MEDICARE

## 2025-07-21 VITALS
BODY MASS INDEX: 50.26 KG/M2 | HEART RATE: 62 BPM | DIASTOLIC BLOOD PRESSURE: 80 MMHG | SYSTOLIC BLOOD PRESSURE: 120 MMHG | HEIGHT: 60 IN | WEIGHT: 256 LBS

## 2025-07-21 DIAGNOSIS — I10 ESSENTIAL HYPERTENSION: Chronic | ICD-10-CM

## 2025-07-21 DIAGNOSIS — T82.857A STENOSIS OF PROSTHETIC AORTIC VALVE, INITIAL ENCOUNTER: Primary | ICD-10-CM

## 2025-07-21 DIAGNOSIS — N18.32 CHRONIC KIDNEY DISEASE, STAGE 3B (HCC): Chronic | ICD-10-CM

## 2025-07-21 DIAGNOSIS — E66.01 MORBID OBESITY (HCC): ICD-10-CM

## 2025-07-21 DIAGNOSIS — I50.32 CHRONIC DIASTOLIC CONGESTIVE HEART FAILURE (HCC): ICD-10-CM

## 2025-07-21 PROCEDURE — 3074F SYST BP LT 130 MM HG: CPT | Performed by: INTERNAL MEDICINE

## 2025-07-21 PROCEDURE — 1126F AMNT PAIN NOTED NONE PRSNT: CPT | Performed by: INTERNAL MEDICINE

## 2025-07-21 PROCEDURE — 3079F DIAST BP 80-89 MM HG: CPT | Performed by: INTERNAL MEDICINE

## 2025-07-21 PROCEDURE — 99215 OFFICE O/P EST HI 40 MIN: CPT | Performed by: INTERNAL MEDICINE

## 2025-07-21 PROCEDURE — 1159F MED LIST DOCD IN RCRD: CPT | Performed by: INTERNAL MEDICINE

## 2025-07-21 PROCEDURE — 1123F ACP DISCUSS/DSCN MKR DOCD: CPT | Performed by: INTERNAL MEDICINE

## 2025-07-21 ASSESSMENT — ENCOUNTER SYMPTOMS: SHORTNESS OF BREATH: 1

## 2025-07-21 NOTE — TELEPHONE ENCOUNTER
The patient was seen today. She has a question. Patient  is asking if she continue physical and occupational therapy. Please contact the patient back.

## 2025-07-21 NOTE — TELEPHONE ENCOUNTER
Per verbal order from Dr Del Rio, okay to continue physical and occupational therapy from his standpoint patient called with Dr Del Rio's response//sitaab

## 2025-07-21 NOTE — PROGRESS NOTES
DISCUSSED).         1. Stenosis of prosthetic aortic valve, initial encounter  Difficult situation in a patient with multiple comorbidities and morbid obesity.  Will get CT scan to see if she would be a candidate for valve in valve TAVR.  Will need cardiac catheterization prior to proceeding with this procedure.    2. Chronic diastolic congestive heart failure (HCC)  Multifactorial.  Continue current diuretic regimen.  Workup for aortic valve intervention    3. Essential hypertension  Blood pressure currently controlled.    4. Chronic kidney disease, stage 3b (HCC)  Noted.  Hold diuretics prior to CT scan.    5. Morbid obesity (HCC)  Difficult situation.  Complicates management.             Thank you for allowing me to participate in this patient's care.  Please call or contact me if there are any questions or concerns regarding the above.      Osmar Del Rio MD  07/21/25  4:21 PM    This note may have been dictated using speech recognition software.  As a result, error of speech recognition may have occurred

## 2025-07-22 ENCOUNTER — TELEPHONE (OUTPATIENT)
Dept: CASE MANAGEMENT | Age: 80
End: 2025-07-22

## 2025-07-22 DIAGNOSIS — I35.0 AORTIC VALVE STENOSIS, ETIOLOGY OF CARDIAC VALVE DISEASE UNSPECIFIED: Primary | ICD-10-CM

## 2025-07-22 NOTE — TELEPHONE ENCOUNTER
Valve Coordinator -Jeannine    [phone]    Aortic Valve Replacement Evaluation  CT scan              NAME: Jovani         DATE: Tuesday, July 29th             Arrival Time: 7:30 am for IV hydration    3 hrs prior to CT and 3 hrs after CT    Enter the hospital on the outpatient side  [address]  Go to the 2nd floor and check in with radiology    CT scan of the Chest/Abdomen/Pelvis:        Time: 11:30 am    Nothing to eat or drink after: 9:00 am     Take your morning medications like normal    Exceptions: do not take Bumex on the day of your scan   Plan for your cardiac cath on 8/4 with Dr. Del Rio    TAVR (Transcatheter Aortic Valve Replacement)  SAVR (Surgical Aortic Valve Replacement)

## 2025-07-29 ENCOUNTER — HOSPITAL ENCOUNTER (OUTPATIENT)
Dept: CT IMAGING | Age: 80
Discharge: HOME | End: 2025-07-31
Attending: INTERNAL MEDICINE
Payer: MEDICARE

## 2025-07-29 VITALS — WEIGHT: 256 LBS | BODY MASS INDEX: 50.26 KG/M2 | HEIGHT: 60 IN

## 2025-07-29 DIAGNOSIS — I35.0 AORTIC VALVE STENOSIS, ETIOLOGY OF CARDIAC VALVE DISEASE UNSPECIFIED: ICD-10-CM

## 2025-07-29 PROCEDURE — 75574 CT ANGIO HRT W/3D IMAGE: CPT

## 2025-07-29 PROCEDURE — 2580000003 HC RX 258: Performed by: INTERNAL MEDICINE

## 2025-07-29 PROCEDURE — 6360000004 HC RX CONTRAST MEDICATION: Performed by: INTERNAL MEDICINE

## 2025-07-29 PROCEDURE — 74174 CTA ABD&PLVS W/CONTRAST: CPT

## 2025-07-29 RX ORDER — IOPAMIDOL 755 MG/ML
100 INJECTION, SOLUTION INTRAVASCULAR
Status: COMPLETED | OUTPATIENT
Start: 2025-07-29 | End: 2025-07-29

## 2025-07-29 RX ORDER — SODIUM CHLORIDE 9 MG/ML
INJECTION, SOLUTION INTRAVENOUS CONTINUOUS
Status: DISCONTINUED | OUTPATIENT
Start: 2025-07-29 | End: 2025-07-29

## 2025-07-29 RX ORDER — SODIUM CHLORIDE 9 MG/ML
INJECTION, SOLUTION INTRAVENOUS CONTINUOUS
Status: ACTIVE | OUTPATIENT
Start: 2025-07-29 | End: 2025-07-29

## 2025-07-29 RX ADMIN — IOPAMIDOL 100 ML: 755 INJECTION, SOLUTION INTRAVENOUS at 11:49

## 2025-07-29 RX ADMIN — SODIUM CHLORIDE: 9 INJECTION, SOLUTION INTRAVENOUS at 08:45

## 2025-07-29 ASSESSMENT — KANSAS CITY CARDIOMYOPATHY QUESTIONNAIRE (KCCQ12)
2. OVER THE PAST 2 WEEKS, HOW MANY TIMES DID YOU HAVE SWELLING IN YOUR FEET, ANKLES OR LEGS WHEN YOU WOKE UP IN THE MORNING: EVERY MORNING
1C. OVER THE PAST 2 WEEKS, HOW MUCH WERE YOU LIMITED BY HEART FAILURE SYMPTOMS (SHORTNESS OF BREATH OR FATIGUE) WHEN HURRYING OR JOGGING (AS IF TO CATCH A BUS): LIMITED FOR OTHER REASONS OR DID NOT DO THE ACTIVITY
6. OVER THE PAST 2 WEEKS, HOW MUCH HAS YOUR HEART FAILURE LIMITED YOUR ENJOYMENT OF LIFE: IT HAS EXTREMELY LIMITED MY ENJOYMENT OF LIFE
1B. OVER THE PAST 2 WEEKS, HOW MUCH WERE YOU LIMITED BY HEART FAILURE SYMPTOMS (SHORTNESS OF BREATH OR FATIGUE) WHEN WALKING 1 BLOCK ON LEVEL GROUND: LIMITED FOR OTHER REASONS OR DID NOT DO THE ACTIVITY
8B. OVER THE PAST 2 WEEKS, ON AVERAGE, HOW HAS HEART FAILURE LIMITED YOU ABILITY TO WORK OR DO HOUSEHOLD CHORES: DOES NOT APPLY OR DID NOT DO FOR OTHER REASONS
7. IF YOU HAD TO SPEND THE REST OF YOUR LIFE WITH YOUR HEART FAILURE THE WAY IT IS RIGHT NOW, HOW WOULD YOU FEEL ABOUT THIS?: NOT AT ALL SATISFIED
5. OVER THE PAST 2 WEEKS, ON AVERAGE, HOW MANY TIMES HAVE YOU BEEN FORCED TO SLEEP SITTING UP IN A CHAIR OR WITH AT LEAST 3 PILLOWS TO PROP YOU UP BECAUSE OF SHORTNESS OF BREATH?: EVERY NIGHT
8C. OVER THE PAST 2 WEEKS, ON AVERAGE, HOW HAS HEART FAILURE LIMITED YOU ABILITY TO VISIT FAMILY AND FRIENDS OUR OF YOUR HOME: SEVERELY LIMITED
1A. OVER THE PAST 2 WEEKS, HOW MUCH WERE YOU LIMITED BY HEART FAILURE SYMPTOMS (SHORTNESS OF BREATH OR FATIGUE) WHEN SHOWERING OR BATHING: EXTREMELY LIMITED
8A. OVER THE PAST 2 WEEKS, ON AVERAGE, HOW HAS HEART FAILURE LIMITED YOU ABILITY TO DO HOBBIES OR RECREATIONAL ACTIVITIES: DOES NOT APPLY OR DID NOT DO FOR OTHER REASONS
3. OVER THE PAST 2 WEEKS, ON AVERAGE, HOW MANY TIMES HAS FATIGUE LIMITED YOUR ABILITY TO DO WHAT YOU WANTED: ALL OF THE TIME

## 2025-07-29 NOTE — NURSE NAVIGATOR
Educational information/pamphlet provided to the pt regarding aortic stenosis and treatment options (SAVR and TAVR). Provided pt the Card[employer]Smart tool for review as the shared decision making process continues between pt and valve team physicians. Also explained that the CT will evaluate the pt for a potential TAVR/SAVR.   Pt came in with wc and then moved to a stretcher and is not able to walk any distance. Provided IV hydration per protocol. Spouse at her side.    Contact information provided for any further questions.    Jeannine, Structural Heart Navigator

## 2025-08-14 ENCOUNTER — OFFICE VISIT (OUTPATIENT)
Age: 80
End: 2025-08-14
Payer: MEDICARE

## 2025-08-14 VITALS
SYSTOLIC BLOOD PRESSURE: 122 MMHG | HEART RATE: 92 BPM | BODY MASS INDEX: 50 KG/M2 | DIASTOLIC BLOOD PRESSURE: 60 MMHG | HEIGHT: 60 IN

## 2025-08-14 DIAGNOSIS — E66.01 MORBID OBESITY (HCC): ICD-10-CM

## 2025-08-14 DIAGNOSIS — N18.32 CHRONIC KIDNEY DISEASE, STAGE 3B (HCC): Chronic | ICD-10-CM

## 2025-08-14 DIAGNOSIS — T82.857D STENOSIS OF PROSTHETIC AORTIC VALVE, SUBSEQUENT ENCOUNTER: Primary | ICD-10-CM

## 2025-08-14 DIAGNOSIS — I50.32 CHRONIC DIASTOLIC CONGESTIVE HEART FAILURE (HCC): ICD-10-CM

## 2025-08-14 DIAGNOSIS — I10 ESSENTIAL HYPERTENSION: Chronic | ICD-10-CM

## 2025-08-14 PROCEDURE — 1126F AMNT PAIN NOTED NONE PRSNT: CPT | Performed by: INTERNAL MEDICINE

## 2025-08-14 PROCEDURE — 99214 OFFICE O/P EST MOD 30 MIN: CPT | Performed by: INTERNAL MEDICINE

## 2025-08-14 PROCEDURE — 3078F DIAST BP <80 MM HG: CPT | Performed by: INTERNAL MEDICINE

## 2025-08-14 PROCEDURE — 1159F MED LIST DOCD IN RCRD: CPT | Performed by: INTERNAL MEDICINE

## 2025-08-14 PROCEDURE — 3074F SYST BP LT 130 MM HG: CPT | Performed by: INTERNAL MEDICINE

## 2025-08-14 PROCEDURE — 1123F ACP DISCUSS/DSCN MKR DOCD: CPT | Performed by: INTERNAL MEDICINE

## 2025-08-14 ASSESSMENT — ENCOUNTER SYMPTOMS: SHORTNESS OF BREATH: 1

## 2025-08-17 ENCOUNTER — HOSPITAL ENCOUNTER (INPATIENT)
Age: 80
LOS: 10 days | Discharge: NURSING, REHAB OR LONG-TERM CARE | DRG: 314 | End: 2025-08-30
Attending: STUDENT IN AN ORGANIZED HEALTH CARE EDUCATION/TRAINING PROGRAM | Admitting: INTERNAL MEDICINE
Payer: MEDICARE

## 2025-08-17 ENCOUNTER — APPOINTMENT (OUTPATIENT)
Dept: GENERAL RADIOLOGY | Age: 80
DRG: 314 | End: 2025-08-17
Payer: MEDICARE

## 2025-08-17 DIAGNOSIS — I50.31 ACUTE DIASTOLIC HEART FAILURE WITH PRESERVED EJECTION FRACTION (HCC): ICD-10-CM

## 2025-08-17 DIAGNOSIS — I50.9 ACUTE ON CHRONIC CONGESTIVE HEART FAILURE, UNSPECIFIED HEART FAILURE TYPE (HCC): Primary | ICD-10-CM

## 2025-08-17 PROBLEM — R06.02 SHORTNESS OF BREATH: Status: ACTIVE | Noted: 2025-08-17

## 2025-08-17 LAB
ALBUMIN SERPL-MCNC: 2.8 G/DL (ref 3.2–4.6)
ALBUMIN/GLOB SERPL: 0.6 (ref 1–1.9)
ALP SERPL-CCNC: 85 U/L (ref 35–104)
ALT SERPL-CCNC: 15 U/L (ref 8–45)
ANION GAP SERPL CALC-SCNC: 11 MMOL/L (ref 7–16)
AST SERPL-CCNC: 18 U/L (ref 15–37)
BASOPHILS # BLD: 0.04 K/UL (ref 0–0.2)
BASOPHILS NFR BLD: 0.6 % (ref 0–2)
BILIRUB SERPL-MCNC: 1.3 MG/DL (ref 0–1.2)
BUN SERPL-MCNC: 40 MG/DL (ref 8–23)
CALCIUM SERPL-MCNC: 9.2 MG/DL (ref 8.8–10.2)
CHLORIDE SERPL-SCNC: 109 MMOL/L (ref 98–107)
CO2 SERPL-SCNC: 24 MMOL/L (ref 20–29)
CREAT SERPL-MCNC: 1.83 MG/DL (ref 0.6–1.1)
DIFFERENTIAL METHOD BLD: ABNORMAL
EOSINOPHIL # BLD: 0.18 K/UL (ref 0–0.8)
EOSINOPHIL NFR BLD: 2.6 % (ref 0.5–7.8)
ERYTHROCYTE [DISTWIDTH] IN BLOOD BY AUTOMATED COUNT: 17.7 % (ref 11.9–14.6)
GLOBULIN SER CALC-MCNC: 4.4 G/DL (ref 2.3–3.5)
GLUCOSE SERPL-MCNC: 103 MG/DL (ref 70–99)
HCT VFR BLD AUTO: 35.7 % (ref 35.8–46.3)
HGB BLD-MCNC: 11.1 G/DL (ref 11.7–15.4)
IMM GRANULOCYTES # BLD AUTO: 0.05 K/UL (ref 0–0.5)
IMM GRANULOCYTES NFR BLD AUTO: 0.7 % (ref 0–5)
LYMPHOCYTES # BLD: 0.79 K/UL (ref 0.5–4.6)
LYMPHOCYTES NFR BLD: 11.3 % (ref 13–44)
MAGNESIUM SERPL-MCNC: 2 MG/DL (ref 1.8–2.4)
MCH RBC QN AUTO: 28.8 PG (ref 26.1–32.9)
MCHC RBC AUTO-ENTMCNC: 31.1 G/DL (ref 31.4–35)
MCV RBC AUTO: 92.5 FL (ref 82–102)
MONOCYTES # BLD: 0.72 K/UL (ref 0.1–1.3)
MONOCYTES NFR BLD: 10.3 % (ref 4–12)
NEUTS SEG # BLD: 5.19 K/UL (ref 1.7–8.2)
NEUTS SEG NFR BLD: 74.5 % (ref 43–78)
NRBC # BLD: 0 K/UL (ref 0–0.2)
NT PRO BNP: ABNORMAL PG/ML (ref 0–450)
PLATELET # BLD AUTO: 90 K/UL (ref 150–450)
PMV BLD AUTO: 12.6 FL (ref 9.4–12.3)
POTASSIUM SERPL-SCNC: 4.4 MMOL/L (ref 3.5–5.1)
PROT SERPL-MCNC: 7.2 G/DL (ref 6.3–8.2)
RBC # BLD AUTO: 3.86 M/UL (ref 4.05–5.2)
SODIUM SERPL-SCNC: 143 MMOL/L (ref 136–145)
TROPONIN T SERPL HS-MCNC: 93.2 NG/L (ref 0–14)
WBC # BLD AUTO: 7 K/UL (ref 4.3–11.1)

## 2025-08-17 PROCEDURE — 83735 ASSAY OF MAGNESIUM: CPT

## 2025-08-17 PROCEDURE — 71046 X-RAY EXAM CHEST 2 VIEWS: CPT

## 2025-08-17 PROCEDURE — 94761 N-INVAS EAR/PLS OXIMETRY MLT: CPT

## 2025-08-17 PROCEDURE — 2500000003 HC RX 250 WO HCPCS: Performed by: PHYSICIAN ASSISTANT

## 2025-08-17 PROCEDURE — 94660 CPAP INITIATION&MGMT: CPT

## 2025-08-17 PROCEDURE — 5A09357 ASSISTANCE WITH RESPIRATORY VENTILATION, LESS THAN 24 CONSECUTIVE HOURS, CONTINUOUS POSITIVE AIRWAY PRESSURE: ICD-10-PCS | Performed by: INTERNAL MEDICINE

## 2025-08-17 PROCEDURE — G0378 HOSPITAL OBSERVATION PER HR: HCPCS

## 2025-08-17 PROCEDURE — 84484 ASSAY OF TROPONIN QUANT: CPT

## 2025-08-17 PROCEDURE — 96376 TX/PRO/DX INJ SAME DRUG ADON: CPT

## 2025-08-17 PROCEDURE — 94760 N-INVAS EAR/PLS OXIMETRY 1: CPT

## 2025-08-17 PROCEDURE — 85025 COMPLETE CBC W/AUTO DIFF WBC: CPT

## 2025-08-17 PROCEDURE — 6360000002 HC RX W HCPCS: Performed by: PHYSICIAN ASSISTANT

## 2025-08-17 PROCEDURE — 94640 AIRWAY INHALATION TREATMENT: CPT

## 2025-08-17 PROCEDURE — 96374 THER/PROPH/DIAG INJ IV PUSH: CPT

## 2025-08-17 PROCEDURE — 99223 1ST HOSP IP/OBS HIGH 75: CPT | Performed by: INTERNAL MEDICINE

## 2025-08-17 PROCEDURE — 83880 ASSAY OF NATRIURETIC PEPTIDE: CPT

## 2025-08-17 PROCEDURE — 6370000000 HC RX 637 (ALT 250 FOR IP): Performed by: INTERNAL MEDICINE

## 2025-08-17 PROCEDURE — 99285 EMERGENCY DEPT VISIT HI MDM: CPT

## 2025-08-17 PROCEDURE — 80053 COMPREHEN METABOLIC PANEL: CPT

## 2025-08-17 PROCEDURE — 6370000000 HC RX 637 (ALT 250 FOR IP): Performed by: PHYSICIAN ASSISTANT

## 2025-08-17 PROCEDURE — 6360000002 HC RX W HCPCS: Performed by: STUDENT IN AN ORGANIZED HEALTH CARE EDUCATION/TRAINING PROGRAM

## 2025-08-17 RX ORDER — SODIUM CHLORIDE 9 MG/ML
INJECTION, SOLUTION INTRAVENOUS PRN
Status: DISCONTINUED | OUTPATIENT
Start: 2025-08-17 | End: 2025-08-30 | Stop reason: HOSPADM

## 2025-08-17 RX ORDER — ALBUTEROL SULFATE 5 MG/ML
2.5 SOLUTION RESPIRATORY (INHALATION) EVERY 6 HOURS PRN
Status: DISCONTINUED | OUTPATIENT
Start: 2025-08-17 | End: 2025-08-30 | Stop reason: HOSPADM

## 2025-08-17 RX ORDER — LEVOTHYROXINE SODIUM 150 UG/1
150 TABLET ORAL
Status: DISCONTINUED | OUTPATIENT
Start: 2025-08-18 | End: 2025-08-30 | Stop reason: HOSPADM

## 2025-08-17 RX ORDER — SODIUM CHLORIDE 0.9 % (FLUSH) 0.9 %
5-40 SYRINGE (ML) INJECTION EVERY 12 HOURS SCHEDULED
Status: DISCONTINUED | OUTPATIENT
Start: 2025-08-17 | End: 2025-08-30 | Stop reason: HOSPADM

## 2025-08-17 RX ORDER — ONDANSETRON 2 MG/ML
4 INJECTION INTRAMUSCULAR; INTRAVENOUS EVERY 6 HOURS PRN
Status: DISCONTINUED | OUTPATIENT
Start: 2025-08-17 | End: 2025-08-30 | Stop reason: HOSPADM

## 2025-08-17 RX ORDER — LEVETIRACETAM 500 MG/1
750 TABLET ORAL 2 TIMES DAILY
Status: DISCONTINUED | OUTPATIENT
Start: 2025-08-17 | End: 2025-08-30 | Stop reason: HOSPADM

## 2025-08-17 RX ORDER — MONTELUKAST SODIUM 10 MG/1
10 TABLET ORAL DAILY
Status: DISCONTINUED | OUTPATIENT
Start: 2025-08-17 | End: 2025-08-30 | Stop reason: HOSPADM

## 2025-08-17 RX ORDER — BUDESONIDE 0.5 MG/2ML
1 INHALANT ORAL
Status: DISCONTINUED | OUTPATIENT
Start: 2025-08-18 | End: 2025-08-27

## 2025-08-17 RX ORDER — BUMETANIDE 0.25 MG/ML
1 INJECTION, SOLUTION INTRAMUSCULAR; INTRAVENOUS
Status: COMPLETED | OUTPATIENT
Start: 2025-08-17 | End: 2025-08-17

## 2025-08-17 RX ORDER — MAGNESIUM SULFATE IN WATER 40 MG/ML
2000 INJECTION, SOLUTION INTRAVENOUS PRN
Status: DISCONTINUED | OUTPATIENT
Start: 2025-08-17 | End: 2025-08-30 | Stop reason: HOSPADM

## 2025-08-17 RX ORDER — FLUTICASONE PROPIONATE 50 MCG
2 SPRAY, SUSPENSION (ML) NASAL 2 TIMES DAILY
Status: DISCONTINUED | OUTPATIENT
Start: 2025-08-17 | End: 2025-08-30 | Stop reason: HOSPADM

## 2025-08-17 RX ORDER — BUMETANIDE 0.25 MG/ML
1 INJECTION, SOLUTION INTRAMUSCULAR; INTRAVENOUS 2 TIMES DAILY
Status: DISCONTINUED | OUTPATIENT
Start: 2025-08-17 | End: 2025-08-18

## 2025-08-17 RX ORDER — ROSUVASTATIN CALCIUM 20 MG/1
40 TABLET, COATED ORAL NIGHTLY
Status: DISCONTINUED | OUTPATIENT
Start: 2025-08-17 | End: 2025-08-30 | Stop reason: HOSPADM

## 2025-08-17 RX ORDER — SODIUM CHLORIDE 0.9 % (FLUSH) 0.9 %
5-40 SYRINGE (ML) INJECTION PRN
Status: DISCONTINUED | OUTPATIENT
Start: 2025-08-17 | End: 2025-08-30 | Stop reason: HOSPADM

## 2025-08-17 RX ORDER — FUROSEMIDE 10 MG/ML
60 INJECTION INTRAMUSCULAR; INTRAVENOUS ONCE
Status: DISCONTINUED | OUTPATIENT
Start: 2025-08-17 | End: 2025-08-17

## 2025-08-17 RX ORDER — CODEINE PHOSPHATE AND GUAIFENESIN 10; 100 MG/5ML; MG/5ML
5 SOLUTION ORAL EVERY 6 HOURS PRN
Status: DISCONTINUED | OUTPATIENT
Start: 2025-08-17 | End: 2025-08-30 | Stop reason: HOSPADM

## 2025-08-17 RX ORDER — ARFORMOTEROL TARTRATE 15 UG/2ML
15 SOLUTION RESPIRATORY (INHALATION)
Status: DISCONTINUED | OUTPATIENT
Start: 2025-08-17 | End: 2025-08-17

## 2025-08-17 RX ORDER — ROSUVASTATIN CALCIUM 20 MG/1
40 TABLET, COATED ORAL EVERY EVENING
Status: DISCONTINUED | OUTPATIENT
Start: 2025-08-17 | End: 2025-08-17

## 2025-08-17 RX ORDER — ACETAMINOPHEN 325 MG/1
650 TABLET ORAL EVERY 6 HOURS PRN
Status: DISCONTINUED | OUTPATIENT
Start: 2025-08-17 | End: 2025-08-30 | Stop reason: HOSPADM

## 2025-08-17 RX ORDER — ONDANSETRON 4 MG/1
4 TABLET, ORALLY DISINTEGRATING ORAL EVERY 8 HOURS PRN
Status: DISCONTINUED | OUTPATIENT
Start: 2025-08-17 | End: 2025-08-30 | Stop reason: HOSPADM

## 2025-08-17 RX ORDER — BUDESONIDE 0.5 MG/2ML
1 INHALANT ORAL
Status: DISCONTINUED | OUTPATIENT
Start: 2025-08-17 | End: 2025-08-17

## 2025-08-17 RX ORDER — ARFORMOTEROL TARTRATE 15 UG/2ML
15 SOLUTION RESPIRATORY (INHALATION)
Status: DISCONTINUED | OUTPATIENT
Start: 2025-08-18 | End: 2025-08-27

## 2025-08-17 RX ORDER — ACETAMINOPHEN 650 MG/1
650 SUPPOSITORY RECTAL EVERY 6 HOURS PRN
Status: DISCONTINUED | OUTPATIENT
Start: 2025-08-17 | End: 2025-08-30 | Stop reason: HOSPADM

## 2025-08-17 RX ORDER — PANTOPRAZOLE SODIUM 40 MG/1
40 TABLET, DELAYED RELEASE ORAL
Status: DISCONTINUED | OUTPATIENT
Start: 2025-08-18 | End: 2025-08-30 | Stop reason: HOSPADM

## 2025-08-17 RX ORDER — POLYETHYLENE GLYCOL 3350 17 G/17G
17 POWDER, FOR SOLUTION ORAL DAILY PRN
Status: DISCONTINUED | OUTPATIENT
Start: 2025-08-17 | End: 2025-08-30 | Stop reason: HOSPADM

## 2025-08-17 RX ORDER — ASPIRIN 81 MG/1
81 TABLET, CHEWABLE ORAL DAILY
Status: DISCONTINUED | OUTPATIENT
Start: 2025-08-17 | End: 2025-08-30 | Stop reason: HOSPADM

## 2025-08-17 RX ORDER — MINERAL OIL/HYDROPHIL PETROLAT
OINTMENT (GRAM) TOPICAL 2 TIMES DAILY PRN
Status: DISCONTINUED | OUTPATIENT
Start: 2025-08-17 | End: 2025-08-30 | Stop reason: HOSPADM

## 2025-08-17 RX ORDER — BENZONATATE 100 MG/1
100 CAPSULE ORAL 3 TIMES DAILY PRN
Status: DISCONTINUED | OUTPATIENT
Start: 2025-08-17 | End: 2025-08-30 | Stop reason: HOSPADM

## 2025-08-17 RX ADMIN — BENZONATATE 100 MG: 100 CAPSULE ORAL at 18:15

## 2025-08-17 RX ADMIN — BUMETANIDE 1 MG: 0.25 INJECTION INTRAMUSCULAR; INTRAVENOUS at 11:03

## 2025-08-17 RX ADMIN — SODIUM CHLORIDE, PRESERVATIVE FREE 10 ML: 5 INJECTION INTRAVENOUS at 20:18

## 2025-08-17 RX ADMIN — IPRATROPIUM BROMIDE 0.5 MG: 0.5 SOLUTION RESPIRATORY (INHALATION) at 19:36

## 2025-08-17 RX ADMIN — ROSUVASTATIN CALCIUM 40 MG: 20 TABLET, FILM COATED ORAL at 20:18

## 2025-08-17 RX ADMIN — ASPIRIN 81 MG: 81 TABLET, CHEWABLE ORAL at 13:47

## 2025-08-17 RX ADMIN — IPRATROPIUM BROMIDE 0.5 MG: 0.5 SOLUTION RESPIRATORY (INHALATION) at 15:36

## 2025-08-17 RX ADMIN — LEVETIRACETAM 750 MG: 500 TABLET, FILM COATED ORAL at 20:18

## 2025-08-17 RX ADMIN — ARFORMOTEROL TARTRATE 15 MCG: 15 SOLUTION RESPIRATORY (INHALATION) at 19:36

## 2025-08-17 RX ADMIN — ACETAMINOPHEN 650 MG: 325 TABLET ORAL at 18:15

## 2025-08-17 RX ADMIN — SERTRALINE 50 MG: 50 TABLET, FILM COATED ORAL at 13:47

## 2025-08-17 RX ADMIN — MONTELUKAST 10 MG: 10 TABLET, FILM COATED ORAL at 13:47

## 2025-08-17 RX ADMIN — BUMETANIDE 1 MG: 0.25 INJECTION INTRAMUSCULAR; INTRAVENOUS at 17:32

## 2025-08-17 RX ADMIN — BUDESONIDE INHALATION 1000 MCG: 0.5 SUSPENSION RESPIRATORY (INHALATION) at 19:36

## 2025-08-17 ASSESSMENT — PAIN SCALES - GENERAL
PAINLEVEL_OUTOF10: 4
PAINLEVEL_OUTOF10: 0
PAINLEVEL_OUTOF10: 0

## 2025-08-17 ASSESSMENT — PAIN - FUNCTIONAL ASSESSMENT
PAIN_FUNCTIONAL_ASSESSMENT: 0-10
PAIN_FUNCTIONAL_ASSESSMENT: 0-10

## 2025-08-17 ASSESSMENT — PAIN DESCRIPTION - DESCRIPTORS: DESCRIPTORS: TIGHTNESS

## 2025-08-17 ASSESSMENT — PAIN DESCRIPTION - LOCATION: LOCATION: CHEST

## 2025-08-18 PROBLEM — I50.31 ACUTE DIASTOLIC (CONGESTIVE) HEART FAILURE (HCC): Status: ACTIVE | Noted: 2025-05-26

## 2025-08-18 LAB
ANION GAP SERPL CALC-SCNC: 12 MMOL/L (ref 7–16)
BUN SERPL-MCNC: 37 MG/DL (ref 8–23)
CALCIUM SERPL-MCNC: 8.7 MG/DL (ref 8.8–10.2)
CHLORIDE SERPL-SCNC: 108 MMOL/L (ref 98–107)
CO2 SERPL-SCNC: 22 MMOL/L (ref 20–29)
CREAT SERPL-MCNC: 1.72 MG/DL (ref 0.6–1.1)
ERYTHROCYTE [DISTWIDTH] IN BLOOD BY AUTOMATED COUNT: 17.6 % (ref 11.9–14.6)
GLUCOSE SERPL-MCNC: 98 MG/DL (ref 70–99)
HCT VFR BLD AUTO: 33.2 % (ref 35.8–46.3)
HGB BLD-MCNC: 10.5 G/DL (ref 11.7–15.4)
MCH RBC QN AUTO: 29.1 PG (ref 26.1–32.9)
MCHC RBC AUTO-ENTMCNC: 31.6 G/DL (ref 31.4–35)
MCV RBC AUTO: 92 FL (ref 82–102)
NRBC # BLD: 0 K/UL (ref 0–0.2)
PLATELET # BLD AUTO: 82 K/UL (ref 150–450)
PMV BLD AUTO: 13.6 FL (ref 9.4–12.3)
POTASSIUM SERPL-SCNC: 4 MMOL/L (ref 3.5–5.1)
RBC # BLD AUTO: 3.61 M/UL (ref 4.05–5.2)
SODIUM SERPL-SCNC: 143 MMOL/L (ref 136–145)
WBC # BLD AUTO: 6.3 K/UL (ref 4.3–11.1)

## 2025-08-18 PROCEDURE — G0378 HOSPITAL OBSERVATION PER HR: HCPCS

## 2025-08-18 PROCEDURE — 2700000000 HC OXYGEN THERAPY PER DAY

## 2025-08-18 PROCEDURE — 94640 AIRWAY INHALATION TREATMENT: CPT

## 2025-08-18 PROCEDURE — 99233 SBSQ HOSP IP/OBS HIGH 50: CPT | Performed by: INTERNAL MEDICINE

## 2025-08-18 PROCEDURE — 6370000000 HC RX 637 (ALT 250 FOR IP): Performed by: CASE MANAGER/CARE COORDINATOR

## 2025-08-18 PROCEDURE — 36415 COLL VENOUS BLD VENIPUNCTURE: CPT

## 2025-08-18 PROCEDURE — 85027 COMPLETE CBC AUTOMATED: CPT

## 2025-08-18 PROCEDURE — 80048 BASIC METABOLIC PNL TOTAL CA: CPT

## 2025-08-18 PROCEDURE — 96376 TX/PRO/DX INJ SAME DRUG ADON: CPT

## 2025-08-18 PROCEDURE — 2500000003 HC RX 250 WO HCPCS: Performed by: PHYSICIAN ASSISTANT

## 2025-08-18 PROCEDURE — 6360000002 HC RX W HCPCS: Performed by: INTERNAL MEDICINE

## 2025-08-18 PROCEDURE — 6370000000 HC RX 637 (ALT 250 FOR IP): Performed by: PHYSICIAN ASSISTANT

## 2025-08-18 PROCEDURE — 6360000002 HC RX W HCPCS: Performed by: PHYSICIAN ASSISTANT

## 2025-08-18 PROCEDURE — 94760 N-INVAS EAR/PLS OXIMETRY 1: CPT

## 2025-08-18 PROCEDURE — 94660 CPAP INITIATION&MGMT: CPT

## 2025-08-18 PROCEDURE — 94761 N-INVAS EAR/PLS OXIMETRY MLT: CPT

## 2025-08-18 PROCEDURE — 6370000000 HC RX 637 (ALT 250 FOR IP): Performed by: INTERNAL MEDICINE

## 2025-08-18 PROCEDURE — 2500000003 HC RX 250 WO HCPCS: Performed by: CASE MANAGER/CARE COORDINATOR

## 2025-08-18 PROCEDURE — 96366 THER/PROPH/DIAG IV INF ADDON: CPT

## 2025-08-18 PROCEDURE — 6370000000 HC RX 637 (ALT 250 FOR IP): Performed by: NURSE PRACTITIONER

## 2025-08-18 PROCEDURE — 96365 THER/PROPH/DIAG IV INF INIT: CPT

## 2025-08-18 RX ORDER — CETIRIZINE HYDROCHLORIDE 10 MG/1
10 TABLET ORAL NIGHTLY
Status: DISCONTINUED | OUTPATIENT
Start: 2025-08-18 | End: 2025-08-19

## 2025-08-18 RX ORDER — CETIRIZINE HYDROCHLORIDE 10 MG/1
10 TABLET ORAL DAILY
COMMUNITY

## 2025-08-18 RX ORDER — POTASSIUM CHLORIDE 750 MG/1
10 TABLET, EXTENDED RELEASE ORAL DAILY
Status: DISCONTINUED | OUTPATIENT
Start: 2025-08-18 | End: 2025-08-30 | Stop reason: HOSPADM

## 2025-08-18 RX ADMIN — ARFORMOTEROL TARTRATE 15 MCG: 15 SOLUTION RESPIRATORY (INHALATION) at 20:41

## 2025-08-18 RX ADMIN — ARFORMOTEROL TARTRATE 15 MCG: 15 SOLUTION RESPIRATORY (INHALATION) at 07:39

## 2025-08-18 RX ADMIN — BUMETANIDE 1 MG: 0.25 INJECTION INTRAMUSCULAR; INTRAVENOUS at 09:12

## 2025-08-18 RX ADMIN — FLUTICASONE PROPIONATE 2 SPRAY: 50 SPRAY, METERED NASAL at 09:21

## 2025-08-18 RX ADMIN — ASPIRIN 81 MG: 81 TABLET, CHEWABLE ORAL at 09:12

## 2025-08-18 RX ADMIN — SERTRALINE 50 MG: 50 TABLET, FILM COATED ORAL at 09:12

## 2025-08-18 RX ADMIN — BUDESONIDE INHALATION 1000 MCG: 0.5 SUSPENSION RESPIRATORY (INHALATION) at 07:39

## 2025-08-18 RX ADMIN — IPRATROPIUM BROMIDE 0.5 MG: 0.5 SOLUTION RESPIRATORY (INHALATION) at 07:39

## 2025-08-18 RX ADMIN — POTASSIUM CHLORIDE 10 MEQ: 750 TABLET, EXTENDED RELEASE ORAL at 15:53

## 2025-08-18 RX ADMIN — SODIUM CHLORIDE, PRESERVATIVE FREE 10 ML: 5 INJECTION INTRAVENOUS at 20:03

## 2025-08-18 RX ADMIN — LEVOTHYROXINE SODIUM 150 MCG: 0.15 TABLET ORAL at 05:56

## 2025-08-18 RX ADMIN — PANTOPRAZOLE SODIUM 40 MG: 40 TABLET, DELAYED RELEASE ORAL at 05:56

## 2025-08-18 RX ADMIN — LEVETIRACETAM 750 MG: 500 TABLET, FILM COATED ORAL at 20:02

## 2025-08-18 RX ADMIN — IPRATROPIUM BROMIDE 0.5 MG: 0.5 SOLUTION RESPIRATORY (INHALATION) at 20:41

## 2025-08-18 RX ADMIN — IPRATROPIUM BROMIDE 0.5 MG: 0.5 SOLUTION RESPIRATORY (INHALATION) at 13:34

## 2025-08-18 RX ADMIN — MICONAZOLE NITRATE: 20 POWDER TOPICAL at 20:03

## 2025-08-18 RX ADMIN — ALBUTEROL SULFATE 2.5 MG: 2.5 SOLUTION RESPIRATORY (INHALATION) at 13:34

## 2025-08-18 RX ADMIN — WHITE PETROLATUM 41 % TOPICAL OINTMENT: at 00:22

## 2025-08-18 RX ADMIN — MICONAZOLE NITRATE: 20 POWDER TOPICAL at 09:21

## 2025-08-18 RX ADMIN — SODIUM CHLORIDE, PRESERVATIVE FREE 10 ML: 5 INJECTION INTRAVENOUS at 09:25

## 2025-08-18 RX ADMIN — LEVETIRACETAM 750 MG: 500 TABLET, FILM COATED ORAL at 09:12

## 2025-08-18 RX ADMIN — BUDESONIDE INHALATION 1000 MCG: 0.5 SUSPENSION RESPIRATORY (INHALATION) at 20:41

## 2025-08-18 RX ADMIN — GUAIFENESIN AND CODEINE PHOSPHATE 5 ML: 100; 10 SOLUTION ORAL at 09:25

## 2025-08-18 RX ADMIN — MONTELUKAST 10 MG: 10 TABLET, FILM COATED ORAL at 09:12

## 2025-08-18 RX ADMIN — MICONAZOLE NITRATE: 20 POWDER TOPICAL at 00:22

## 2025-08-18 RX ADMIN — ROSUVASTATIN CALCIUM 40 MG: 20 TABLET, FILM COATED ORAL at 20:02

## 2025-08-18 RX ADMIN — CETIRIZINE HYDROCHLORIDE 10 MG: 10 TABLET, FILM COATED ORAL at 20:02

## 2025-08-18 RX ADMIN — GUAIFENESIN AND CODEINE PHOSPHATE 5 ML: 100; 10 SOLUTION ORAL at 00:20

## 2025-08-18 RX ADMIN — BUMETANIDE 0.5 MG/HR: 0.25 INJECTION INTRAMUSCULAR; INTRAVENOUS at 17:06

## 2025-08-18 RX ADMIN — FLUTICASONE PROPIONATE 2 SPRAY: 50 SPRAY, METERED NASAL at 20:03

## 2025-08-18 ASSESSMENT — PAIN SCALES - GENERAL
PAINLEVEL_OUTOF10: 0

## 2025-08-19 PROBLEM — I50.9 ACUTE ON CHRONIC CONGESTIVE HEART FAILURE (HCC): Status: ACTIVE | Noted: 2025-08-19

## 2025-08-19 PROBLEM — Z51.5 ENCOUNTER FOR PALLIATIVE CARE: Status: ACTIVE | Noted: 2025-08-19

## 2025-08-19 PROBLEM — R54 FRAILTY: Status: ACTIVE | Noted: 2025-08-19

## 2025-08-19 LAB
ANION GAP SERPL CALC-SCNC: 13 MMOL/L (ref 7–16)
BUN SERPL-MCNC: 36 MG/DL (ref 8–23)
CALCIUM SERPL-MCNC: 8.2 MG/DL (ref 8.8–10.2)
CHLORIDE SERPL-SCNC: 109 MMOL/L (ref 98–107)
CO2 SERPL-SCNC: 20 MMOL/L (ref 20–29)
CREAT SERPL-MCNC: 1.85 MG/DL (ref 0.6–1.1)
ERYTHROCYTE [DISTWIDTH] IN BLOOD BY AUTOMATED COUNT: 17.8 % (ref 11.9–14.6)
GLUCOSE SERPL-MCNC: 96 MG/DL (ref 70–99)
HCT VFR BLD AUTO: 36.2 % (ref 35.8–46.3)
HGB BLD-MCNC: 11.2 G/DL (ref 11.7–15.4)
MAGNESIUM SERPL-MCNC: 1.9 MG/DL (ref 1.8–2.4)
MCH RBC QN AUTO: 29.1 PG (ref 26.1–32.9)
MCHC RBC AUTO-ENTMCNC: 30.9 G/DL (ref 31.4–35)
MCV RBC AUTO: 94 FL (ref 82–102)
NRBC # BLD: 0 K/UL (ref 0–0.2)
PLATELET # BLD AUTO: 75 K/UL (ref 150–450)
PMV BLD AUTO: 12.9 FL (ref 9.4–12.3)
POTASSIUM SERPL-SCNC: 4.1 MMOL/L (ref 3.5–5.1)
RBC # BLD AUTO: 3.85 M/UL (ref 4.05–5.2)
SODIUM SERPL-SCNC: 142 MMOL/L (ref 136–145)
WBC # BLD AUTO: 6.4 K/UL (ref 4.3–11.1)

## 2025-08-19 PROCEDURE — 36415 COLL VENOUS BLD VENIPUNCTURE: CPT

## 2025-08-19 PROCEDURE — 6360000002 HC RX W HCPCS: Performed by: INTERNAL MEDICINE

## 2025-08-19 PROCEDURE — 94640 AIRWAY INHALATION TREATMENT: CPT

## 2025-08-19 PROCEDURE — 85027 COMPLETE CBC AUTOMATED: CPT

## 2025-08-19 PROCEDURE — 80048 BASIC METABOLIC PNL TOTAL CA: CPT

## 2025-08-19 PROCEDURE — 94660 CPAP INITIATION&MGMT: CPT

## 2025-08-19 PROCEDURE — G0378 HOSPITAL OBSERVATION PER HR: HCPCS

## 2025-08-19 PROCEDURE — 94761 N-INVAS EAR/PLS OXIMETRY MLT: CPT

## 2025-08-19 PROCEDURE — 99232 SBSQ HOSP IP/OBS MODERATE 35: CPT | Performed by: INTERNAL MEDICINE

## 2025-08-19 PROCEDURE — 6370000000 HC RX 637 (ALT 250 FOR IP): Performed by: PHYSICIAN ASSISTANT

## 2025-08-19 PROCEDURE — 96366 THER/PROPH/DIAG IV INF ADDON: CPT

## 2025-08-19 PROCEDURE — 2700000000 HC OXYGEN THERAPY PER DAY

## 2025-08-19 PROCEDURE — 6360000002 HC RX W HCPCS: Performed by: PHYSICIAN ASSISTANT

## 2025-08-19 PROCEDURE — 83735 ASSAY OF MAGNESIUM: CPT

## 2025-08-19 PROCEDURE — 6370000000 HC RX 637 (ALT 250 FOR IP): Performed by: INTERNAL MEDICINE

## 2025-08-19 PROCEDURE — 6370000000 HC RX 637 (ALT 250 FOR IP): Performed by: CASE MANAGER/CARE COORDINATOR

## 2025-08-19 PROCEDURE — 99222 1ST HOSP IP/OBS MODERATE 55: CPT | Performed by: INTERNAL MEDICINE

## 2025-08-19 PROCEDURE — 2500000003 HC RX 250 WO HCPCS: Performed by: PHYSICIAN ASSISTANT

## 2025-08-19 PROCEDURE — 6370000000 HC RX 637 (ALT 250 FOR IP): Performed by: NURSE PRACTITIONER

## 2025-08-19 RX ORDER — CETIRIZINE HYDROCHLORIDE 10 MG/1
10 TABLET ORAL DAILY
Status: DISCONTINUED | OUTPATIENT
Start: 2025-08-20 | End: 2025-08-30 | Stop reason: HOSPADM

## 2025-08-19 RX ADMIN — FLUTICASONE PROPIONATE 2 SPRAY: 50 SPRAY, METERED NASAL at 21:22

## 2025-08-19 RX ADMIN — ALBUTEROL SULFATE 2.5 MG: 2.5 SOLUTION RESPIRATORY (INHALATION) at 10:05

## 2025-08-19 RX ADMIN — IPRATROPIUM BROMIDE 0.5 MG: 0.5 SOLUTION RESPIRATORY (INHALATION) at 08:02

## 2025-08-19 RX ADMIN — IPRATROPIUM BROMIDE 0.5 MG: 0.5 SOLUTION RESPIRATORY (INHALATION) at 15:23

## 2025-08-19 RX ADMIN — BUDESONIDE INHALATION 1000 MCG: 0.5 SUSPENSION RESPIRATORY (INHALATION) at 20:10

## 2025-08-19 RX ADMIN — IPRATROPIUM BROMIDE 0.5 MG: 0.5 SOLUTION RESPIRATORY (INHALATION) at 20:10

## 2025-08-19 RX ADMIN — MICONAZOLE NITRATE: 20 POWDER TOPICAL at 08:47

## 2025-08-19 RX ADMIN — ALBUTEROL SULFATE 2.5 MG: 2.5 SOLUTION RESPIRATORY (INHALATION) at 18:01

## 2025-08-19 RX ADMIN — BUMETANIDE 0.5 MG/HR: 0.25 INJECTION INTRAMUSCULAR; INTRAVENOUS at 05:26

## 2025-08-19 RX ADMIN — BUMETANIDE 1 MG/HR: 0.25 INJECTION INTRAMUSCULAR; INTRAVENOUS at 22:37

## 2025-08-19 RX ADMIN — SODIUM CHLORIDE, PRESERVATIVE FREE 10 ML: 5 INJECTION INTRAVENOUS at 21:22

## 2025-08-19 RX ADMIN — LEVOTHYROXINE SODIUM 150 MCG: 0.15 TABLET ORAL at 05:21

## 2025-08-19 RX ADMIN — LEVETIRACETAM 750 MG: 500 TABLET, FILM COATED ORAL at 08:40

## 2025-08-19 RX ADMIN — ROSUVASTATIN CALCIUM 40 MG: 20 TABLET, FILM COATED ORAL at 21:22

## 2025-08-19 RX ADMIN — SODIUM CHLORIDE, PRESERVATIVE FREE 10 ML: 5 INJECTION INTRAVENOUS at 08:41

## 2025-08-19 RX ADMIN — POTASSIUM CHLORIDE 10 MEQ: 750 TABLET, EXTENDED RELEASE ORAL at 08:40

## 2025-08-19 RX ADMIN — LEVETIRACETAM 750 MG: 500 TABLET, FILM COATED ORAL at 21:22

## 2025-08-19 RX ADMIN — ASPIRIN 81 MG: 81 TABLET, CHEWABLE ORAL at 08:40

## 2025-08-19 RX ADMIN — MONTELUKAST 10 MG: 10 TABLET, FILM COATED ORAL at 08:40

## 2025-08-19 RX ADMIN — BUDESONIDE INHALATION 1000 MCG: 0.5 SUSPENSION RESPIRATORY (INHALATION) at 08:02

## 2025-08-19 RX ADMIN — FLUTICASONE PROPIONATE 2 SPRAY: 50 SPRAY, METERED NASAL at 08:47

## 2025-08-19 RX ADMIN — GUAIFENESIN AND CODEINE PHOSPHATE 5 ML: 100; 10 SOLUTION ORAL at 08:38

## 2025-08-19 RX ADMIN — ARFORMOTEROL TARTRATE 15 MCG: 15 SOLUTION RESPIRATORY (INHALATION) at 20:10

## 2025-08-19 RX ADMIN — ARFORMOTEROL TARTRATE 15 MCG: 15 SOLUTION RESPIRATORY (INHALATION) at 08:02

## 2025-08-19 RX ADMIN — PANTOPRAZOLE SODIUM 40 MG: 40 TABLET, DELAYED RELEASE ORAL at 05:21

## 2025-08-19 ASSESSMENT — PAIN SCALES - GENERAL
PAINLEVEL_OUTOF10: 0

## 2025-08-20 PROBLEM — I50.9 ACUTE ON CHRONIC CONGESTIVE HEART FAILURE (HCC): Status: RESOLVED | Noted: 2025-08-19 | Resolved: 2025-08-20

## 2025-08-20 PROBLEM — I50.32 CHRONIC DIASTOLIC CONGESTIVE HEART FAILURE (HCC): Status: RESOLVED | Noted: 2017-06-27 | Resolved: 2025-08-20

## 2025-08-20 PROBLEM — I50.33 ACUTE ON CHRONIC DIASTOLIC CONGESTIVE HEART FAILURE (HCC): Status: ACTIVE | Noted: 2017-06-27

## 2025-08-20 PROBLEM — R06.02 SHORTNESS OF BREATH: Status: RESOLVED | Noted: 2025-08-17 | Resolved: 2025-08-20

## 2025-08-20 PROBLEM — I50.31 ACUTE DIASTOLIC HEART FAILURE WITH PRESERVED EJECTION FRACTION (HCC): Status: ACTIVE | Noted: 2025-08-20

## 2025-08-20 PROBLEM — I50.31 ACUTE DIASTOLIC (CONGESTIVE) HEART FAILURE (HCC): Status: RESOLVED | Noted: 2025-05-26 | Resolved: 2025-08-20

## 2025-08-20 LAB
ANION GAP SERPL CALC-SCNC: 13 MMOL/L (ref 7–16)
BUN SERPL-MCNC: 38 MG/DL (ref 8–23)
CALCIUM SERPL-MCNC: 8.4 MG/DL (ref 8.8–10.2)
CHLORIDE SERPL-SCNC: 105 MMOL/L (ref 98–107)
CO2 SERPL-SCNC: 24 MMOL/L (ref 20–29)
CREAT SERPL-MCNC: 1.95 MG/DL (ref 0.6–1.1)
ERYTHROCYTE [DISTWIDTH] IN BLOOD BY AUTOMATED COUNT: 17.5 % (ref 11.9–14.6)
GLUCOSE SERPL-MCNC: 111 MG/DL (ref 70–99)
HCT VFR BLD AUTO: 35.5 % (ref 35.8–46.3)
HGB BLD-MCNC: 11.3 G/DL (ref 11.7–15.4)
MCH RBC QN AUTO: 29.4 PG (ref 26.1–32.9)
MCHC RBC AUTO-ENTMCNC: 31.8 G/DL (ref 31.4–35)
MCV RBC AUTO: 92.4 FL (ref 82–102)
NRBC # BLD: 0 K/UL (ref 0–0.2)
PLATELET # BLD AUTO: 79 K/UL (ref 150–450)
PMV BLD AUTO: 13.5 FL (ref 9.4–12.3)
POTASSIUM SERPL-SCNC: 3.7 MMOL/L (ref 3.5–5.1)
RBC # BLD AUTO: 3.84 M/UL (ref 4.05–5.2)
SODIUM SERPL-SCNC: 142 MMOL/L (ref 136–145)
WBC # BLD AUTO: 7 K/UL (ref 4.3–11.1)

## 2025-08-20 PROCEDURE — 94660 CPAP INITIATION&MGMT: CPT

## 2025-08-20 PROCEDURE — 97165 OT EVAL LOW COMPLEX 30 MIN: CPT

## 2025-08-20 PROCEDURE — 6370000000 HC RX 637 (ALT 250 FOR IP)

## 2025-08-20 PROCEDURE — 2700000000 HC OXYGEN THERAPY PER DAY

## 2025-08-20 PROCEDURE — 6360000002 HC RX W HCPCS: Performed by: INTERNAL MEDICINE

## 2025-08-20 PROCEDURE — 80048 BASIC METABOLIC PNL TOTAL CA: CPT

## 2025-08-20 PROCEDURE — 97530 THERAPEUTIC ACTIVITIES: CPT

## 2025-08-20 PROCEDURE — 6370000000 HC RX 637 (ALT 250 FOR IP): Performed by: INTERNAL MEDICINE

## 2025-08-20 PROCEDURE — 2500000003 HC RX 250 WO HCPCS: Performed by: PHYSICIAN ASSISTANT

## 2025-08-20 PROCEDURE — 94640 AIRWAY INHALATION TREATMENT: CPT

## 2025-08-20 PROCEDURE — 85027 COMPLETE CBC AUTOMATED: CPT

## 2025-08-20 PROCEDURE — 36415 COLL VENOUS BLD VENIPUNCTURE: CPT

## 2025-08-20 PROCEDURE — 6370000000 HC RX 637 (ALT 250 FOR IP): Performed by: PHYSICIAN ASSISTANT

## 2025-08-20 PROCEDURE — 6370000000 HC RX 637 (ALT 250 FOR IP): Performed by: NURSE PRACTITIONER

## 2025-08-20 PROCEDURE — 97161 PT EVAL LOW COMPLEX 20 MIN: CPT

## 2025-08-20 PROCEDURE — 99232 SBSQ HOSP IP/OBS MODERATE 35: CPT | Performed by: INTERNAL MEDICINE

## 2025-08-20 PROCEDURE — 2140000000 HC CCU INTERMEDIATE R&B

## 2025-08-20 PROCEDURE — 97535 SELF CARE MNGMENT TRAINING: CPT

## 2025-08-20 PROCEDURE — 97112 NEUROMUSCULAR REEDUCATION: CPT

## 2025-08-20 PROCEDURE — 94761 N-INVAS EAR/PLS OXIMETRY MLT: CPT

## 2025-08-20 PROCEDURE — 96366 THER/PROPH/DIAG IV INF ADDON: CPT

## 2025-08-20 PROCEDURE — 6360000002 HC RX W HCPCS: Performed by: PHYSICIAN ASSISTANT

## 2025-08-20 PROCEDURE — 6370000000 HC RX 637 (ALT 250 FOR IP): Performed by: CASE MANAGER/CARE COORDINATOR

## 2025-08-20 RX ORDER — FERROUS SULFATE 325(65) MG
325 TABLET ORAL
Status: DISCONTINUED | OUTPATIENT
Start: 2025-08-20 | End: 2025-08-30 | Stop reason: HOSPADM

## 2025-08-20 RX ORDER — MENTHOL/CAMPHOR/ALLANTOIN/PHE 0.6-0.5-1%
OINTMENT(EA) TOPICAL PRN
Status: DISCONTINUED | OUTPATIENT
Start: 2025-08-20 | End: 2025-08-30 | Stop reason: HOSPADM

## 2025-08-20 RX ADMIN — SODIUM CHLORIDE, PRESERVATIVE FREE 10 ML: 5 INJECTION INTRAVENOUS at 21:42

## 2025-08-20 RX ADMIN — MONTELUKAST 10 MG: 10 TABLET, FILM COATED ORAL at 09:08

## 2025-08-20 RX ADMIN — SERTRALINE 50 MG: 50 TABLET, FILM COATED ORAL at 09:09

## 2025-08-20 RX ADMIN — FLUTICASONE PROPIONATE 2 SPRAY: 50 SPRAY, METERED NASAL at 10:43

## 2025-08-20 RX ADMIN — GUAIFENESIN AND CODEINE PHOSPHATE 5 ML: 100; 10 SOLUTION ORAL at 21:51

## 2025-08-20 RX ADMIN — ARFORMOTEROL TARTRATE 15 MCG: 15 SOLUTION RESPIRATORY (INHALATION) at 20:45

## 2025-08-20 RX ADMIN — FERROUS SULFATE TAB 325 MG (65 MG ELEMENTAL FE) 325 MG: 325 (65 FE) TAB at 09:08

## 2025-08-20 RX ADMIN — IPRATROPIUM BROMIDE 0.5 MG: 0.5 SOLUTION RESPIRATORY (INHALATION) at 07:45

## 2025-08-20 RX ADMIN — LEVOTHYROXINE SODIUM 150 MCG: 0.15 TABLET ORAL at 05:37

## 2025-08-20 RX ADMIN — SODIUM CHLORIDE, PRESERVATIVE FREE 10 ML: 5 INJECTION INTRAVENOUS at 09:25

## 2025-08-20 RX ADMIN — ALBUTEROL SULFATE 2.5 MG: 2.5 SOLUTION RESPIRATORY (INHALATION) at 13:09

## 2025-08-20 RX ADMIN — ROSUVASTATIN CALCIUM 40 MG: 20 TABLET, FILM COATED ORAL at 21:42

## 2025-08-20 RX ADMIN — BUDESONIDE INHALATION 1000 MCG: 0.5 SUSPENSION RESPIRATORY (INHALATION) at 07:44

## 2025-08-20 RX ADMIN — CETIRIZINE HYDROCHLORIDE 10 MG: 10 TABLET, FILM COATED ORAL at 09:08

## 2025-08-20 RX ADMIN — LEVETIRACETAM 750 MG: 500 TABLET, FILM COATED ORAL at 21:42

## 2025-08-20 RX ADMIN — GUAIFENESIN AND CODEINE PHOSPHATE 5 ML: 100; 10 SOLUTION ORAL at 03:14

## 2025-08-20 RX ADMIN — PANTOPRAZOLE SODIUM 40 MG: 40 TABLET, DELAYED RELEASE ORAL at 05:37

## 2025-08-20 RX ADMIN — MICONAZOLE NITRATE: 20 POWDER TOPICAL at 10:43

## 2025-08-20 RX ADMIN — IPRATROPIUM BROMIDE 0.5 MG: 0.5 SOLUTION RESPIRATORY (INHALATION) at 20:45

## 2025-08-20 RX ADMIN — BUDESONIDE INHALATION 1000 MCG: 0.5 SUSPENSION RESPIRATORY (INHALATION) at 20:45

## 2025-08-20 RX ADMIN — ARFORMOTEROL TARTRATE 15 MCG: 15 SOLUTION RESPIRATORY (INHALATION) at 07:45

## 2025-08-20 RX ADMIN — LEVETIRACETAM 750 MG: 500 TABLET, FILM COATED ORAL at 09:09

## 2025-08-20 RX ADMIN — FLUTICASONE PROPIONATE 2 SPRAY: 50 SPRAY, METERED NASAL at 21:42

## 2025-08-20 RX ADMIN — BUMETANIDE 1 MG/HR: 0.25 INJECTION INTRAMUSCULAR; INTRAVENOUS at 23:04

## 2025-08-20 RX ADMIN — POTASSIUM CHLORIDE 10 MEQ: 750 TABLET, EXTENDED RELEASE ORAL at 09:08

## 2025-08-20 RX ADMIN — BUMETANIDE 1 MG/HR: 0.25 INJECTION INTRAMUSCULAR; INTRAVENOUS at 09:24

## 2025-08-20 RX ADMIN — GUAIFENESIN AND CODEINE PHOSPHATE 5 ML: 100; 10 SOLUTION ORAL at 09:09

## 2025-08-20 RX ADMIN — ASPIRIN 81 MG: 81 TABLET, CHEWABLE ORAL at 09:08

## 2025-08-20 ASSESSMENT — PAIN SCALES - GENERAL
PAINLEVEL_OUTOF10: 0

## 2025-08-21 LAB
ANION GAP SERPL CALC-SCNC: 12 MMOL/L (ref 7–16)
APPEARANCE UR: ABNORMAL
BACTERIA URNS QL MICRO: ABNORMAL /HPF
BILIRUB UR QL: NEGATIVE
BUN SERPL-MCNC: 35 MG/DL (ref 8–23)
CALCIUM SERPL-MCNC: 8.3 MG/DL (ref 8.8–10.2)
CASTS URNS QL MICRO: 0 /LPF
CHLORIDE SERPL-SCNC: 103 MMOL/L (ref 98–107)
CO2 SERPL-SCNC: 28 MMOL/L (ref 20–29)
COLOR UR: ABNORMAL
CREAT SERPL-MCNC: 1.84 MG/DL (ref 0.6–1.1)
CRYSTALS URNS QL MICRO: 0 /LPF
EPI CELLS #/AREA URNS HPF: ABNORMAL /HPF
ERYTHROCYTE [DISTWIDTH] IN BLOOD BY AUTOMATED COUNT: 17.3 % (ref 11.9–14.6)
GLUCOSE SERPL-MCNC: 98 MG/DL (ref 70–99)
GLUCOSE UR STRIP.AUTO-MCNC: NEGATIVE MG/DL
HCT VFR BLD AUTO: 34.3 % (ref 35.8–46.3)
HGB BLD-MCNC: 11 G/DL (ref 11.7–15.4)
HGB UR QL STRIP: ABNORMAL
HYALINE CASTS URNS QL MICRO: ABNORMAL /LPF
KETONES UR QL STRIP.AUTO: NEGATIVE MG/DL
LEUKOCYTE ESTERASE UR QL STRIP.AUTO: ABNORMAL
MCH RBC QN AUTO: 29.7 PG (ref 26.1–32.9)
MCHC RBC AUTO-ENTMCNC: 32.1 G/DL (ref 31.4–35)
MCV RBC AUTO: 92.7 FL (ref 82–102)
MUCOUS THREADS URNS QL MICRO: 0 /LPF
NITRITE UR QL STRIP.AUTO: POSITIVE
NRBC # BLD: 0 K/UL (ref 0–0.2)
PH UR STRIP: 5.5 (ref 5–9)
PLATELET # BLD AUTO: 77 K/UL (ref 150–450)
PMV BLD AUTO: ABNORMAL FL (ref 9.4–12.3)
POTASSIUM SERPL-SCNC: 3.6 MMOL/L (ref 3.5–5.1)
PROT UR STRIP-MCNC: ABNORMAL MG/DL
RBC # BLD AUTO: 3.7 M/UL (ref 4.05–5.2)
RBC #/AREA URNS HPF: ABNORMAL /HPF
SODIUM SERPL-SCNC: 143 MMOL/L (ref 136–145)
SP GR UR REFRACTOMETRY: 1.01 (ref 1–1.02)
URINE CULTURE IF INDICATED: ABNORMAL
UROBILINOGEN UR QL STRIP.AUTO: 1 EU/DL (ref 0.2–1)
WBC # BLD AUTO: 6.4 K/UL (ref 4.3–11.1)
WBC URNS QL MICRO: >100 /HPF

## 2025-08-21 PROCEDURE — 6360000002 HC RX W HCPCS: Performed by: INTERNAL MEDICINE

## 2025-08-21 PROCEDURE — 99232 SBSQ HOSP IP/OBS MODERATE 35: CPT | Performed by: INTERNAL MEDICINE

## 2025-08-21 PROCEDURE — 2700000000 HC OXYGEN THERAPY PER DAY

## 2025-08-21 PROCEDURE — 94761 N-INVAS EAR/PLS OXIMETRY MLT: CPT

## 2025-08-21 PROCEDURE — 80048 BASIC METABOLIC PNL TOTAL CA: CPT

## 2025-08-21 PROCEDURE — 94640 AIRWAY INHALATION TREATMENT: CPT

## 2025-08-21 PROCEDURE — 87186 SC STD MICRODIL/AGAR DIL: CPT

## 2025-08-21 PROCEDURE — 94760 N-INVAS EAR/PLS OXIMETRY 1: CPT

## 2025-08-21 PROCEDURE — 6370000000 HC RX 637 (ALT 250 FOR IP): Performed by: INTERNAL MEDICINE

## 2025-08-21 PROCEDURE — 2140000000 HC CCU INTERMEDIATE R&B

## 2025-08-21 PROCEDURE — 2500000003 HC RX 250 WO HCPCS: Performed by: PHYSICIAN ASSISTANT

## 2025-08-21 PROCEDURE — 87088 URINE BACTERIA CULTURE: CPT

## 2025-08-21 PROCEDURE — 6370000000 HC RX 637 (ALT 250 FOR IP): Performed by: CASE MANAGER/CARE COORDINATOR

## 2025-08-21 PROCEDURE — 85027 COMPLETE CBC AUTOMATED: CPT

## 2025-08-21 PROCEDURE — 6370000000 HC RX 637 (ALT 250 FOR IP): Performed by: PHYSICIAN ASSISTANT

## 2025-08-21 PROCEDURE — 6370000000 HC RX 637 (ALT 250 FOR IP): Performed by: NURSE PRACTITIONER

## 2025-08-21 PROCEDURE — 6360000002 HC RX W HCPCS

## 2025-08-21 PROCEDURE — 6370000000 HC RX 637 (ALT 250 FOR IP)

## 2025-08-21 PROCEDURE — 87086 URINE CULTURE/COLONY COUNT: CPT

## 2025-08-21 PROCEDURE — 36415 COLL VENOUS BLD VENIPUNCTURE: CPT

## 2025-08-21 PROCEDURE — 81001 URINALYSIS AUTO W/SCOPE: CPT

## 2025-08-21 PROCEDURE — 2500000003 HC RX 250 WO HCPCS

## 2025-08-21 RX ADMIN — ROSUVASTATIN CALCIUM 40 MG: 20 TABLET, FILM COATED ORAL at 20:56

## 2025-08-21 RX ADMIN — WATER 1000 MG: 1 INJECTION INTRAMUSCULAR; INTRAVENOUS; SUBCUTANEOUS at 18:16

## 2025-08-21 RX ADMIN — LEVETIRACETAM 750 MG: 500 TABLET, FILM COATED ORAL at 20:56

## 2025-08-21 RX ADMIN — IPRATROPIUM BROMIDE 0.5 MG: 0.5 SOLUTION RESPIRATORY (INHALATION) at 20:28

## 2025-08-21 RX ADMIN — SODIUM CHLORIDE, PRESERVATIVE FREE 10 ML: 5 INJECTION INTRAVENOUS at 08:19

## 2025-08-21 RX ADMIN — ARFORMOTEROL TARTRATE 15 MCG: 15 SOLUTION RESPIRATORY (INHALATION) at 20:28

## 2025-08-21 RX ADMIN — LEVOTHYROXINE SODIUM 150 MCG: 0.15 TABLET ORAL at 05:00

## 2025-08-21 RX ADMIN — FLUTICASONE PROPIONATE 2 SPRAY: 50 SPRAY, METERED NASAL at 08:27

## 2025-08-21 RX ADMIN — SODIUM CHLORIDE, PRESERVATIVE FREE 10 ML: 5 INJECTION INTRAVENOUS at 20:56

## 2025-08-21 RX ADMIN — ACETAMINOPHEN 650 MG: 325 TABLET ORAL at 17:48

## 2025-08-21 RX ADMIN — ARFORMOTEROL TARTRATE 15 MCG: 15 SOLUTION RESPIRATORY (INHALATION) at 07:12

## 2025-08-21 RX ADMIN — GUAIFENESIN AND CODEINE PHOSPHATE 5 ML: 100; 10 SOLUTION ORAL at 21:13

## 2025-08-21 RX ADMIN — SERTRALINE 50 MG: 50 TABLET, FILM COATED ORAL at 08:15

## 2025-08-21 RX ADMIN — ASPIRIN 81 MG: 81 TABLET, CHEWABLE ORAL at 08:15

## 2025-08-21 RX ADMIN — GUAIFENESIN AND CODEINE PHOSPHATE 5 ML: 100; 10 SOLUTION ORAL at 08:15

## 2025-08-21 RX ADMIN — POTASSIUM CHLORIDE 10 MEQ: 750 TABLET, EXTENDED RELEASE ORAL at 08:15

## 2025-08-21 RX ADMIN — MICONAZOLE NITRATE: 20 POWDER TOPICAL at 08:20

## 2025-08-21 RX ADMIN — FERROUS SULFATE TAB 325 MG (65 MG ELEMENTAL FE) 325 MG: 325 (65 FE) TAB at 08:14

## 2025-08-21 RX ADMIN — MONTELUKAST 10 MG: 10 TABLET, FILM COATED ORAL at 08:14

## 2025-08-21 RX ADMIN — IPRATROPIUM BROMIDE 0.5 MG: 0.5 SOLUTION RESPIRATORY (INHALATION) at 07:12

## 2025-08-21 RX ADMIN — LEVETIRACETAM 750 MG: 500 TABLET, FILM COATED ORAL at 08:15

## 2025-08-21 RX ADMIN — BUDESONIDE INHALATION 1000 MCG: 0.5 SUSPENSION RESPIRATORY (INHALATION) at 20:28

## 2025-08-21 RX ADMIN — BUDESONIDE INHALATION 1000 MCG: 0.5 SUSPENSION RESPIRATORY (INHALATION) at 07:12

## 2025-08-21 RX ADMIN — IPRATROPIUM BROMIDE 0.5 MG: 0.5 SOLUTION RESPIRATORY (INHALATION) at 15:05

## 2025-08-21 RX ADMIN — CETIRIZINE HYDROCHLORIDE 10 MG: 10 TABLET, FILM COATED ORAL at 08:14

## 2025-08-21 RX ADMIN — BUMETANIDE 1 MG/HR: 0.25 INJECTION INTRAMUSCULAR; INTRAVENOUS at 11:51

## 2025-08-21 RX ADMIN — PANTOPRAZOLE SODIUM 40 MG: 40 TABLET, DELAYED RELEASE ORAL at 05:00

## 2025-08-21 RX ADMIN — FLUTICASONE PROPIONATE 2 SPRAY: 50 SPRAY, METERED NASAL at 20:56

## 2025-08-21 ASSESSMENT — PAIN SCALES - GENERAL
PAINLEVEL_OUTOF10: 5
PAINLEVEL_OUTOF10: 0

## 2025-08-21 ASSESSMENT — PAIN - FUNCTIONAL ASSESSMENT
PAIN_FUNCTIONAL_ASSESSMENT: 0-10
PAIN_FUNCTIONAL_ASSESSMENT: ACTIVITIES ARE NOT PREVENTED
PAIN_FUNCTIONAL_ASSESSMENT: 0-10

## 2025-08-21 ASSESSMENT — PAIN DESCRIPTION - LOCATION: LOCATION: CHEST;RIB CAGE

## 2025-08-21 ASSESSMENT — PAIN DESCRIPTION - ORIENTATION: ORIENTATION: RIGHT;LEFT;MID

## 2025-08-21 ASSESSMENT — PAIN DESCRIPTION - DESCRIPTORS: DESCRIPTORS: PRESSURE

## 2025-08-22 LAB
ANION GAP SERPL CALC-SCNC: 13 MMOL/L (ref 7–16)
BUN SERPL-MCNC: 34 MG/DL (ref 8–23)
CALCIUM SERPL-MCNC: 8.6 MG/DL (ref 8.8–10.2)
CHLORIDE SERPL-SCNC: 101 MMOL/L (ref 98–107)
CO2 SERPL-SCNC: 30 MMOL/L (ref 20–29)
CREAT SERPL-MCNC: 1.66 MG/DL (ref 0.6–1.1)
ERYTHROCYTE [DISTWIDTH] IN BLOOD BY AUTOMATED COUNT: 17.1 % (ref 11.9–14.6)
GLUCOSE SERPL-MCNC: 97 MG/DL (ref 70–99)
HCT VFR BLD AUTO: 36.5 % (ref 35.8–46.3)
HGB BLD-MCNC: 11.8 G/DL (ref 11.7–15.4)
MAGNESIUM SERPL-MCNC: 1.7 MG/DL (ref 1.8–2.4)
MCH RBC QN AUTO: 29.4 PG (ref 26.1–32.9)
MCHC RBC AUTO-ENTMCNC: 32.3 G/DL (ref 31.4–35)
MCV RBC AUTO: 91 FL (ref 82–102)
NRBC # BLD: 0 K/UL (ref 0–0.2)
PLATELET # BLD AUTO: 84 K/UL (ref 150–450)
PMV BLD AUTO: ABNORMAL FL (ref 9.4–12.3)
POTASSIUM SERPL-SCNC: 3.5 MMOL/L (ref 3.5–5.1)
RBC # BLD AUTO: 4.01 M/UL (ref 4.05–5.2)
SODIUM SERPL-SCNC: 143 MMOL/L (ref 136–145)
WBC # BLD AUTO: 5.6 K/UL (ref 4.3–11.1)

## 2025-08-22 PROCEDURE — 6360000002 HC RX W HCPCS

## 2025-08-22 PROCEDURE — 2500000003 HC RX 250 WO HCPCS: Performed by: PHYSICIAN ASSISTANT

## 2025-08-22 PROCEDURE — 85027 COMPLETE CBC AUTOMATED: CPT

## 2025-08-22 PROCEDURE — 94640 AIRWAY INHALATION TREATMENT: CPT

## 2025-08-22 PROCEDURE — 83735 ASSAY OF MAGNESIUM: CPT

## 2025-08-22 PROCEDURE — 6360000002 HC RX W HCPCS: Performed by: INTERNAL MEDICINE

## 2025-08-22 PROCEDURE — 94761 N-INVAS EAR/PLS OXIMETRY MLT: CPT

## 2025-08-22 PROCEDURE — 6370000000 HC RX 637 (ALT 250 FOR IP): Performed by: INTERNAL MEDICINE

## 2025-08-22 PROCEDURE — 2580000003 HC RX 258

## 2025-08-22 PROCEDURE — 99232 SBSQ HOSP IP/OBS MODERATE 35: CPT | Performed by: INTERNAL MEDICINE

## 2025-08-22 PROCEDURE — 80048 BASIC METABOLIC PNL TOTAL CA: CPT

## 2025-08-22 PROCEDURE — 6370000000 HC RX 637 (ALT 250 FOR IP): Performed by: CASE MANAGER/CARE COORDINATOR

## 2025-08-22 PROCEDURE — 97530 THERAPEUTIC ACTIVITIES: CPT

## 2025-08-22 PROCEDURE — 2700000000 HC OXYGEN THERAPY PER DAY

## 2025-08-22 PROCEDURE — 2140000000 HC CCU INTERMEDIATE R&B

## 2025-08-22 PROCEDURE — 6370000000 HC RX 637 (ALT 250 FOR IP): Performed by: PHYSICIAN ASSISTANT

## 2025-08-22 PROCEDURE — 6370000000 HC RX 637 (ALT 250 FOR IP)

## 2025-08-22 PROCEDURE — 6370000000 HC RX 637 (ALT 250 FOR IP): Performed by: NURSE PRACTITIONER

## 2025-08-22 PROCEDURE — 36415 COLL VENOUS BLD VENIPUNCTURE: CPT

## 2025-08-22 RX ORDER — MAGNESIUM SULFATE IN WATER 40 MG/ML
2000 INJECTION, SOLUTION INTRAVENOUS ONCE
Status: COMPLETED | OUTPATIENT
Start: 2025-08-22 | End: 2025-08-22

## 2025-08-22 RX ORDER — POTASSIUM CHLORIDE 1500 MG/1
40 TABLET, EXTENDED RELEASE ORAL ONCE
Status: COMPLETED | OUTPATIENT
Start: 2025-08-22 | End: 2025-08-22

## 2025-08-22 RX ADMIN — PANTOPRAZOLE SODIUM 40 MG: 40 TABLET, DELAYED RELEASE ORAL at 04:44

## 2025-08-22 RX ADMIN — BUDESONIDE INHALATION 1000 MCG: 0.5 SUSPENSION RESPIRATORY (INHALATION) at 20:16

## 2025-08-22 RX ADMIN — BENZONATATE 100 MG: 100 CAPSULE ORAL at 21:26

## 2025-08-22 RX ADMIN — MAGNESIUM SULFATE HEPTAHYDRATE 2000 MG: 40 INJECTION, SOLUTION INTRAVENOUS at 20:27

## 2025-08-22 RX ADMIN — SODIUM CHLORIDE, PRESERVATIVE FREE 5 ML: 5 INJECTION INTRAVENOUS at 20:28

## 2025-08-22 RX ADMIN — POTASSIUM CHLORIDE 40 MEQ: 1500 TABLET, EXTENDED RELEASE ORAL at 20:27

## 2025-08-22 RX ADMIN — GUAIFENESIN AND CODEINE PHOSPHATE 5 ML: 100; 10 SOLUTION ORAL at 21:26

## 2025-08-22 RX ADMIN — LEVETIRACETAM 750 MG: 500 TABLET, FILM COATED ORAL at 09:08

## 2025-08-22 RX ADMIN — FLUTICASONE PROPIONATE 2 SPRAY: 50 SPRAY, METERED NASAL at 09:10

## 2025-08-22 RX ADMIN — MICONAZOLE NITRATE: 20 POWDER TOPICAL at 09:10

## 2025-08-22 RX ADMIN — BUDESONIDE INHALATION 1000 MCG: 0.5 SUSPENSION RESPIRATORY (INHALATION) at 07:54

## 2025-08-22 RX ADMIN — ACETAMINOPHEN 650 MG: 325 TABLET ORAL at 20:27

## 2025-08-22 RX ADMIN — FLUTICASONE PROPIONATE 2 SPRAY: 50 SPRAY, METERED NASAL at 20:28

## 2025-08-22 RX ADMIN — FERROUS SULFATE TAB 325 MG (65 MG ELEMENTAL FE) 325 MG: 325 (65 FE) TAB at 09:08

## 2025-08-22 RX ADMIN — MICONAZOLE NITRATE: 20 POWDER TOPICAL at 20:28

## 2025-08-22 RX ADMIN — LEVETIRACETAM 750 MG: 500 TABLET, FILM COATED ORAL at 20:27

## 2025-08-22 RX ADMIN — SERTRALINE 50 MG: 50 TABLET, FILM COATED ORAL at 09:08

## 2025-08-22 RX ADMIN — ARFORMOTEROL TARTRATE 15 MCG: 15 SOLUTION RESPIRATORY (INHALATION) at 07:54

## 2025-08-22 RX ADMIN — BUMETANIDE 1 MG/HR: 0.25 INJECTION INTRAMUSCULAR; INTRAVENOUS at 14:17

## 2025-08-22 RX ADMIN — BUMETANIDE 1 MG/HR: 0.25 INJECTION INTRAMUSCULAR; INTRAVENOUS at 01:00

## 2025-08-22 RX ADMIN — CEFTRIAXONE SODIUM 2000 MG: 2 INJECTION, POWDER, FOR SOLUTION INTRAMUSCULAR; INTRAVENOUS at 18:49

## 2025-08-22 RX ADMIN — ARFORMOTEROL TARTRATE 15 MCG: 15 SOLUTION RESPIRATORY (INHALATION) at 20:16

## 2025-08-22 RX ADMIN — MONTELUKAST 10 MG: 10 TABLET, FILM COATED ORAL at 09:08

## 2025-08-22 RX ADMIN — POTASSIUM CHLORIDE 10 MEQ: 750 TABLET, EXTENDED RELEASE ORAL at 09:08

## 2025-08-22 RX ADMIN — LEVOTHYROXINE SODIUM 150 MCG: 0.15 TABLET ORAL at 04:44

## 2025-08-22 RX ADMIN — ROSUVASTATIN CALCIUM 40 MG: 20 TABLET, FILM COATED ORAL at 20:27

## 2025-08-22 RX ADMIN — IPRATROPIUM BROMIDE 0.5 MG: 0.5 SOLUTION RESPIRATORY (INHALATION) at 14:49

## 2025-08-22 RX ADMIN — CETIRIZINE HYDROCHLORIDE 10 MG: 10 TABLET, FILM COATED ORAL at 09:08

## 2025-08-22 RX ADMIN — IPRATROPIUM BROMIDE 0.5 MG: 0.5 SOLUTION RESPIRATORY (INHALATION) at 20:16

## 2025-08-22 RX ADMIN — IPRATROPIUM BROMIDE 0.5 MG: 0.5 SOLUTION RESPIRATORY (INHALATION) at 07:54

## 2025-08-22 RX ADMIN — SODIUM CHLORIDE, PRESERVATIVE FREE 10 ML: 5 INJECTION INTRAVENOUS at 09:10

## 2025-08-22 RX ADMIN — ASPIRIN 81 MG: 81 TABLET, CHEWABLE ORAL at 09:08

## 2025-08-22 ASSESSMENT — PAIN SCALES - GENERAL
PAINLEVEL_OUTOF10: 0
PAINLEVEL_OUTOF10: 2
PAINLEVEL_OUTOF10: 0

## 2025-08-22 ASSESSMENT — PAIN - FUNCTIONAL ASSESSMENT: PAIN_FUNCTIONAL_ASSESSMENT: 0-10

## 2025-08-22 ASSESSMENT — PAIN DESCRIPTION - LOCATION: LOCATION: BACK

## 2025-08-22 ASSESSMENT — PAIN DESCRIPTION - DESCRIPTORS: DESCRIPTORS: ACHING

## 2025-08-23 LAB
ANION GAP SERPL CALC-SCNC: 11 MMOL/L (ref 7–16)
BUN SERPL-MCNC: 33 MG/DL (ref 8–23)
CALCIUM SERPL-MCNC: 8.5 MG/DL (ref 8.8–10.2)
CHLORIDE SERPL-SCNC: 101 MMOL/L (ref 98–107)
CO2 SERPL-SCNC: 30 MMOL/L (ref 20–29)
CREAT SERPL-MCNC: 1.66 MG/DL (ref 0.6–1.1)
ERYTHROCYTE [DISTWIDTH] IN BLOOD BY AUTOMATED COUNT: 17.1 % (ref 11.9–14.6)
GLUCOSE SERPL-MCNC: 102 MG/DL (ref 70–99)
HCT VFR BLD AUTO: 36 % (ref 35.8–46.3)
HGB BLD-MCNC: 11.2 G/DL (ref 11.7–15.4)
MAGNESIUM SERPL-MCNC: 2.2 MG/DL (ref 1.8–2.4)
MCH RBC QN AUTO: 28.9 PG (ref 26.1–32.9)
MCHC RBC AUTO-ENTMCNC: 31.1 G/DL (ref 31.4–35)
MCV RBC AUTO: 92.8 FL (ref 82–102)
NRBC # BLD: 0 K/UL (ref 0–0.2)
PLATELET # BLD AUTO: 86 K/UL (ref 150–450)
PMV BLD AUTO: 12.9 FL (ref 9.4–12.3)
POTASSIUM SERPL-SCNC: 3.9 MMOL/L (ref 3.5–5.1)
RBC # BLD AUTO: 3.88 M/UL (ref 4.05–5.2)
SODIUM SERPL-SCNC: 142 MMOL/L (ref 136–145)
WBC # BLD AUTO: 6.2 K/UL (ref 4.3–11.1)

## 2025-08-23 PROCEDURE — 6360000002 HC RX W HCPCS: Performed by: INTERNAL MEDICINE

## 2025-08-23 PROCEDURE — 36415 COLL VENOUS BLD VENIPUNCTURE: CPT

## 2025-08-23 PROCEDURE — 2700000000 HC OXYGEN THERAPY PER DAY

## 2025-08-23 PROCEDURE — 80048 BASIC METABOLIC PNL TOTAL CA: CPT

## 2025-08-23 PROCEDURE — 6370000000 HC RX 637 (ALT 250 FOR IP): Performed by: CASE MANAGER/CARE COORDINATOR

## 2025-08-23 PROCEDURE — 6370000000 HC RX 637 (ALT 250 FOR IP): Performed by: NURSE PRACTITIONER

## 2025-08-23 PROCEDURE — 94660 CPAP INITIATION&MGMT: CPT

## 2025-08-23 PROCEDURE — 6370000000 HC RX 637 (ALT 250 FOR IP): Performed by: INTERNAL MEDICINE

## 2025-08-23 PROCEDURE — 2500000003 HC RX 250 WO HCPCS: Performed by: PHYSICIAN ASSISTANT

## 2025-08-23 PROCEDURE — 99232 SBSQ HOSP IP/OBS MODERATE 35: CPT | Performed by: INTERNAL MEDICINE

## 2025-08-23 PROCEDURE — 94640 AIRWAY INHALATION TREATMENT: CPT

## 2025-08-23 PROCEDURE — 6370000000 HC RX 637 (ALT 250 FOR IP)

## 2025-08-23 PROCEDURE — 6370000000 HC RX 637 (ALT 250 FOR IP): Performed by: PHYSICIAN ASSISTANT

## 2025-08-23 PROCEDURE — 83735 ASSAY OF MAGNESIUM: CPT

## 2025-08-23 PROCEDURE — 6360000002 HC RX W HCPCS

## 2025-08-23 PROCEDURE — 2140000000 HC CCU INTERMEDIATE R&B

## 2025-08-23 PROCEDURE — 85027 COMPLETE CBC AUTOMATED: CPT

## 2025-08-23 PROCEDURE — 2580000003 HC RX 258

## 2025-08-23 RX ADMIN — BUMETANIDE 1 MG/HR: 0.25 INJECTION INTRAMUSCULAR; INTRAVENOUS at 17:06

## 2025-08-23 RX ADMIN — MONTELUKAST 10 MG: 10 TABLET, FILM COATED ORAL at 10:04

## 2025-08-23 RX ADMIN — BUDESONIDE INHALATION 1000 MCG: 0.5 SUSPENSION RESPIRATORY (INHALATION) at 08:12

## 2025-08-23 RX ADMIN — PANTOPRAZOLE SODIUM 40 MG: 40 TABLET, DELAYED RELEASE ORAL at 06:17

## 2025-08-23 RX ADMIN — ASPIRIN 81 MG: 81 TABLET, CHEWABLE ORAL at 10:04

## 2025-08-23 RX ADMIN — MICONAZOLE NITRATE: 20 POWDER TOPICAL at 20:06

## 2025-08-23 RX ADMIN — ARFORMOTEROL TARTRATE 15 MCG: 15 SOLUTION RESPIRATORY (INHALATION) at 08:12

## 2025-08-23 RX ADMIN — FLUTICASONE PROPIONATE 2 SPRAY: 50 SPRAY, METERED NASAL at 20:05

## 2025-08-23 RX ADMIN — IPRATROPIUM BROMIDE 0.5 MG: 0.5 SOLUTION RESPIRATORY (INHALATION) at 08:12

## 2025-08-23 RX ADMIN — BUMETANIDE 1 MG/HR: 0.25 INJECTION INTRAMUSCULAR; INTRAVENOUS at 03:26

## 2025-08-23 RX ADMIN — CEFTRIAXONE SODIUM 2000 MG: 2 INJECTION, POWDER, FOR SOLUTION INTRAMUSCULAR; INTRAVENOUS at 18:08

## 2025-08-23 RX ADMIN — SERTRALINE 50 MG: 50 TABLET, FILM COATED ORAL at 10:04

## 2025-08-23 RX ADMIN — BUDESONIDE INHALATION 1000 MCG: 0.5 SUSPENSION RESPIRATORY (INHALATION) at 20:21

## 2025-08-23 RX ADMIN — GUAIFENESIN AND CODEINE PHOSPHATE 5 ML: 100; 10 SOLUTION ORAL at 20:14

## 2025-08-23 RX ADMIN — FLUTICASONE PROPIONATE 2 SPRAY: 50 SPRAY, METERED NASAL at 10:06

## 2025-08-23 RX ADMIN — BENZONATATE 100 MG: 100 CAPSULE ORAL at 15:44

## 2025-08-23 RX ADMIN — MICONAZOLE NITRATE: 20 POWDER TOPICAL at 10:06

## 2025-08-23 RX ADMIN — IPRATROPIUM BROMIDE 0.5 MG: 0.5 SOLUTION RESPIRATORY (INHALATION) at 13:40

## 2025-08-23 RX ADMIN — LEVOTHYROXINE SODIUM 150 MCG: 0.15 TABLET ORAL at 06:17

## 2025-08-23 RX ADMIN — FERROUS SULFATE TAB 325 MG (65 MG ELEMENTAL FE) 325 MG: 325 (65 FE) TAB at 10:04

## 2025-08-23 RX ADMIN — SODIUM CHLORIDE, PRESERVATIVE FREE 10 ML: 5 INJECTION INTRAVENOUS at 10:07

## 2025-08-23 RX ADMIN — LEVETIRACETAM 750 MG: 500 TABLET, FILM COATED ORAL at 10:05

## 2025-08-23 RX ADMIN — POTASSIUM CHLORIDE 10 MEQ: 750 TABLET, EXTENDED RELEASE ORAL at 10:04

## 2025-08-23 RX ADMIN — ROSUVASTATIN CALCIUM 40 MG: 20 TABLET, FILM COATED ORAL at 20:14

## 2025-08-23 RX ADMIN — SODIUM CHLORIDE, PRESERVATIVE FREE 5 ML: 5 INJECTION INTRAVENOUS at 20:05

## 2025-08-23 RX ADMIN — IPRATROPIUM BROMIDE 0.5 MG: 0.5 SOLUTION RESPIRATORY (INHALATION) at 20:21

## 2025-08-23 RX ADMIN — CETIRIZINE HYDROCHLORIDE 10 MG: 10 TABLET, FILM COATED ORAL at 10:04

## 2025-08-23 RX ADMIN — LEVETIRACETAM 750 MG: 500 TABLET, FILM COATED ORAL at 20:14

## 2025-08-23 RX ADMIN — ARFORMOTEROL TARTRATE 15 MCG: 15 SOLUTION RESPIRATORY (INHALATION) at 20:21

## 2025-08-23 ASSESSMENT — PAIN SCALES - GENERAL
PAINLEVEL_OUTOF10: 0

## 2025-08-24 LAB
ANION GAP SERPL CALC-SCNC: 12 MMOL/L (ref 7–16)
BACTERIA SPEC CULT: ABNORMAL
BUN SERPL-MCNC: 31 MG/DL (ref 8–23)
CALCIUM SERPL-MCNC: 8.7 MG/DL (ref 8.8–10.2)
CHLORIDE SERPL-SCNC: 99 MMOL/L (ref 98–107)
CO2 SERPL-SCNC: 32 MMOL/L (ref 20–29)
CREAT SERPL-MCNC: 1.55 MG/DL (ref 0.6–1.1)
ERYTHROCYTE [DISTWIDTH] IN BLOOD BY AUTOMATED COUNT: 16.7 % (ref 11.9–14.6)
GLUCOSE SERPL-MCNC: 92 MG/DL (ref 70–99)
HCT VFR BLD AUTO: 37.1 % (ref 35.8–46.3)
HGB BLD-MCNC: 11.7 G/DL (ref 11.7–15.4)
MAGNESIUM SERPL-MCNC: 2.1 MG/DL (ref 1.8–2.4)
MCH RBC QN AUTO: 28.9 PG (ref 26.1–32.9)
MCHC RBC AUTO-ENTMCNC: 31.5 G/DL (ref 31.4–35)
MCV RBC AUTO: 91.6 FL (ref 82–102)
NRBC # BLD: 0 K/UL (ref 0–0.2)
PLATELET # BLD AUTO: 92 K/UL (ref 150–450)
PMV BLD AUTO: ABNORMAL FL (ref 9.4–12.3)
POTASSIUM SERPL-SCNC: 3.4 MMOL/L (ref 3.5–5.1)
RBC # BLD AUTO: 4.05 M/UL (ref 4.05–5.2)
SERVICE CMNT-IMP: ABNORMAL
SODIUM SERPL-SCNC: 143 MMOL/L (ref 136–145)
WBC # BLD AUTO: 5.4 K/UL (ref 4.3–11.1)

## 2025-08-24 PROCEDURE — 80048 BASIC METABOLIC PNL TOTAL CA: CPT

## 2025-08-24 PROCEDURE — 94660 CPAP INITIATION&MGMT: CPT

## 2025-08-24 PROCEDURE — 83735 ASSAY OF MAGNESIUM: CPT

## 2025-08-24 PROCEDURE — 6360000002 HC RX W HCPCS: Performed by: INTERNAL MEDICINE

## 2025-08-24 PROCEDURE — 6370000000 HC RX 637 (ALT 250 FOR IP)

## 2025-08-24 PROCEDURE — 6370000000 HC RX 637 (ALT 250 FOR IP): Performed by: NURSE PRACTITIONER

## 2025-08-24 PROCEDURE — 2700000000 HC OXYGEN THERAPY PER DAY

## 2025-08-24 PROCEDURE — 2140000000 HC CCU INTERMEDIATE R&B

## 2025-08-24 PROCEDURE — 94640 AIRWAY INHALATION TREATMENT: CPT

## 2025-08-24 PROCEDURE — 2500000003 HC RX 250 WO HCPCS: Performed by: PHYSICIAN ASSISTANT

## 2025-08-24 PROCEDURE — 99232 SBSQ HOSP IP/OBS MODERATE 35: CPT | Performed by: INTERNAL MEDICINE

## 2025-08-24 PROCEDURE — 6370000000 HC RX 637 (ALT 250 FOR IP): Performed by: INTERNAL MEDICINE

## 2025-08-24 PROCEDURE — 6360000002 HC RX W HCPCS

## 2025-08-24 PROCEDURE — 85027 COMPLETE CBC AUTOMATED: CPT

## 2025-08-24 PROCEDURE — 2580000003 HC RX 258

## 2025-08-24 PROCEDURE — 6370000000 HC RX 637 (ALT 250 FOR IP): Performed by: PHYSICIAN ASSISTANT

## 2025-08-24 PROCEDURE — 36415 COLL VENOUS BLD VENIPUNCTURE: CPT

## 2025-08-24 RX ORDER — CALCIUM CARBONATE 500 MG/1
500 TABLET, CHEWABLE ORAL ONCE
Status: COMPLETED | OUTPATIENT
Start: 2025-08-24 | End: 2025-08-24

## 2025-08-24 RX ORDER — POTASSIUM CHLORIDE 1500 MG/1
40 TABLET, EXTENDED RELEASE ORAL PRN
Status: DISCONTINUED | OUTPATIENT
Start: 2025-08-24 | End: 2025-08-30 | Stop reason: HOSPADM

## 2025-08-24 RX ORDER — FONDAPARINUX SODIUM 2.5 MG/.5ML
2.5 INJECTION SUBCUTANEOUS DAILY
Status: DISCONTINUED | OUTPATIENT
Start: 2025-08-24 | End: 2025-08-30 | Stop reason: HOSPADM

## 2025-08-24 RX ORDER — POTASSIUM CHLORIDE 7.45 MG/ML
10 INJECTION INTRAVENOUS PRN
Status: DISCONTINUED | OUTPATIENT
Start: 2025-08-24 | End: 2025-08-30 | Stop reason: HOSPADM

## 2025-08-24 RX ORDER — POTASSIUM CHLORIDE 1500 MG/1
40 TABLET, EXTENDED RELEASE ORAL ONCE
Status: COMPLETED | OUTPATIENT
Start: 2025-08-24 | End: 2025-08-24

## 2025-08-24 RX ADMIN — FERROUS SULFATE TAB 325 MG (65 MG ELEMENTAL FE) 325 MG: 325 (65 FE) TAB at 08:23

## 2025-08-24 RX ADMIN — BENZONATATE 100 MG: 100 CAPSULE ORAL at 20:42

## 2025-08-24 RX ADMIN — ACETAMINOPHEN 650 MG: 325 TABLET ORAL at 20:42

## 2025-08-24 RX ADMIN — FLUTICASONE PROPIONATE 2 SPRAY: 50 SPRAY, METERED NASAL at 20:43

## 2025-08-24 RX ADMIN — LEVOTHYROXINE SODIUM 150 MCG: 0.15 TABLET ORAL at 05:40

## 2025-08-24 RX ADMIN — ASPIRIN 81 MG: 81 TABLET, CHEWABLE ORAL at 08:23

## 2025-08-24 RX ADMIN — LEVETIRACETAM 750 MG: 500 TABLET, FILM COATED ORAL at 08:23

## 2025-08-24 RX ADMIN — SODIUM CHLORIDE, PRESERVATIVE FREE 5 ML: 5 INJECTION INTRAVENOUS at 20:42

## 2025-08-24 RX ADMIN — MICONAZOLE NITRATE: 20 POWDER TOPICAL at 20:42

## 2025-08-24 RX ADMIN — IPRATROPIUM BROMIDE 0.5 MG: 0.5 SOLUTION RESPIRATORY (INHALATION) at 14:13

## 2025-08-24 RX ADMIN — ARFORMOTEROL TARTRATE 15 MCG: 15 SOLUTION RESPIRATORY (INHALATION) at 08:54

## 2025-08-24 RX ADMIN — CEFTRIAXONE SODIUM 2000 MG: 2 INJECTION, POWDER, FOR SOLUTION INTRAMUSCULAR; INTRAVENOUS at 18:13

## 2025-08-24 RX ADMIN — BUDESONIDE INHALATION 1000 MCG: 0.5 SUSPENSION RESPIRATORY (INHALATION) at 08:53

## 2025-08-24 RX ADMIN — POTASSIUM CHLORIDE 40 MEQ: 1500 TABLET, EXTENDED RELEASE ORAL at 13:19

## 2025-08-24 RX ADMIN — FLUTICASONE PROPIONATE 2 SPRAY: 50 SPRAY, METERED NASAL at 08:25

## 2025-08-24 RX ADMIN — CETIRIZINE HYDROCHLORIDE 10 MG: 10 TABLET, FILM COATED ORAL at 08:23

## 2025-08-24 RX ADMIN — CALCIUM CARBONATE (ANTACID) CHEW TAB 500 MG 500 MG: 500 CHEW TAB at 18:14

## 2025-08-24 RX ADMIN — IPRATROPIUM BROMIDE 0.5 MG: 0.5 SOLUTION RESPIRATORY (INHALATION) at 19:48

## 2025-08-24 RX ADMIN — FONDAPARINUX SODIUM 2.5 MG: 2.5 INJECTION, SOLUTION SUBCUTANEOUS at 15:22

## 2025-08-24 RX ADMIN — ARFORMOTEROL TARTRATE 15 MCG: 15 SOLUTION RESPIRATORY (INHALATION) at 19:48

## 2025-08-24 RX ADMIN — BUMETANIDE 1 MG/HR: 0.25 INJECTION INTRAMUSCULAR; INTRAVENOUS at 00:42

## 2025-08-24 RX ADMIN — ROSUVASTATIN CALCIUM 40 MG: 20 TABLET, FILM COATED ORAL at 20:41

## 2025-08-24 RX ADMIN — PANTOPRAZOLE SODIUM 40 MG: 40 TABLET, DELAYED RELEASE ORAL at 05:40

## 2025-08-24 RX ADMIN — BUMETANIDE 1 MG/HR: 0.25 INJECTION INTRAMUSCULAR; INTRAVENOUS at 22:00

## 2025-08-24 RX ADMIN — SERTRALINE 50 MG: 50 TABLET, FILM COATED ORAL at 08:23

## 2025-08-24 RX ADMIN — MICONAZOLE NITRATE: 20 POWDER TOPICAL at 08:26

## 2025-08-24 RX ADMIN — MONTELUKAST 10 MG: 10 TABLET, FILM COATED ORAL at 08:23

## 2025-08-24 RX ADMIN — POTASSIUM CHLORIDE 10 MEQ: 750 TABLET, EXTENDED RELEASE ORAL at 08:23

## 2025-08-24 RX ADMIN — SODIUM CHLORIDE, PRESERVATIVE FREE 10 ML: 5 INJECTION INTRAVENOUS at 08:25

## 2025-08-24 RX ADMIN — LEVETIRACETAM 750 MG: 500 TABLET, FILM COATED ORAL at 20:42

## 2025-08-24 RX ADMIN — BUDESONIDE INHALATION 1000 MCG: 0.5 SUSPENSION RESPIRATORY (INHALATION) at 19:48

## 2025-08-24 RX ADMIN — BUMETANIDE 1 MG/HR: 0.25 INJECTION INTRAMUSCULAR; INTRAVENOUS at 13:57

## 2025-08-24 RX ADMIN — IPRATROPIUM BROMIDE 0.5 MG: 0.5 SOLUTION RESPIRATORY (INHALATION) at 08:54

## 2025-08-24 ASSESSMENT — PAIN SCALES - GENERAL
PAINLEVEL_OUTOF10: 1
PAINLEVEL_OUTOF10: 0

## 2025-08-24 ASSESSMENT — PAIN DESCRIPTION - LOCATION: LOCATION: HEAD

## 2025-08-24 ASSESSMENT — PAIN - FUNCTIONAL ASSESSMENT: PAIN_FUNCTIONAL_ASSESSMENT: 0-10

## 2025-08-25 LAB
ANION GAP SERPL CALC-SCNC: 11 MMOL/L (ref 7–16)
BUN SERPL-MCNC: 33 MG/DL (ref 8–23)
CALCIUM SERPL-MCNC: 8.7 MG/DL (ref 8.8–10.2)
CHLORIDE SERPL-SCNC: 100 MMOL/L (ref 98–107)
CO2 SERPL-SCNC: 31 MMOL/L (ref 20–29)
CREAT SERPL-MCNC: 1.74 MG/DL (ref 0.6–1.1)
ERYTHROCYTE [DISTWIDTH] IN BLOOD BY AUTOMATED COUNT: 16.6 % (ref 11.9–14.6)
GLUCOSE SERPL-MCNC: 106 MG/DL (ref 70–99)
HCT VFR BLD AUTO: 35.4 % (ref 35.8–46.3)
HGB BLD-MCNC: 11.2 G/DL (ref 11.7–15.4)
MAGNESIUM SERPL-MCNC: 2.2 MG/DL (ref 1.8–2.4)
MCH RBC QN AUTO: 28.9 PG (ref 26.1–32.9)
MCHC RBC AUTO-ENTMCNC: 31.6 G/DL (ref 31.4–35)
MCV RBC AUTO: 91.2 FL (ref 82–102)
NRBC # BLD: 0 K/UL (ref 0–0.2)
PLATELET # BLD AUTO: 95 K/UL (ref 150–450)
PMV BLD AUTO: 13.3 FL (ref 9.4–12.3)
POTASSIUM SERPL-SCNC: 3.6 MMOL/L (ref 3.5–5.1)
RBC # BLD AUTO: 3.88 M/UL (ref 4.05–5.2)
SODIUM SERPL-SCNC: 142 MMOL/L (ref 136–145)
WBC # BLD AUTO: 5.6 K/UL (ref 4.3–11.1)

## 2025-08-25 PROCEDURE — 97530 THERAPEUTIC ACTIVITIES: CPT

## 2025-08-25 PROCEDURE — 2500000003 HC RX 250 WO HCPCS: Performed by: PHYSICIAN ASSISTANT

## 2025-08-25 PROCEDURE — 94640 AIRWAY INHALATION TREATMENT: CPT

## 2025-08-25 PROCEDURE — 6370000000 HC RX 637 (ALT 250 FOR IP): Performed by: INTERNAL MEDICINE

## 2025-08-25 PROCEDURE — 99232 SBSQ HOSP IP/OBS MODERATE 35: CPT | Performed by: INTERNAL MEDICINE

## 2025-08-25 PROCEDURE — 6370000000 HC RX 637 (ALT 250 FOR IP): Performed by: PHYSICIAN ASSISTANT

## 2025-08-25 PROCEDURE — 80048 BASIC METABOLIC PNL TOTAL CA: CPT

## 2025-08-25 PROCEDURE — 36415 COLL VENOUS BLD VENIPUNCTURE: CPT

## 2025-08-25 PROCEDURE — 2140000000 HC CCU INTERMEDIATE R&B

## 2025-08-25 PROCEDURE — 6360000002 HC RX W HCPCS: Performed by: INTERNAL MEDICINE

## 2025-08-25 PROCEDURE — 6370000000 HC RX 637 (ALT 250 FOR IP): Performed by: NURSE PRACTITIONER

## 2025-08-25 PROCEDURE — 2700000000 HC OXYGEN THERAPY PER DAY

## 2025-08-25 PROCEDURE — 6360000002 HC RX W HCPCS

## 2025-08-25 PROCEDURE — 94761 N-INVAS EAR/PLS OXIMETRY MLT: CPT

## 2025-08-25 PROCEDURE — 6370000000 HC RX 637 (ALT 250 FOR IP)

## 2025-08-25 PROCEDURE — 83735 ASSAY OF MAGNESIUM: CPT

## 2025-08-25 PROCEDURE — 97112 NEUROMUSCULAR REEDUCATION: CPT

## 2025-08-25 PROCEDURE — 85027 COMPLETE CBC AUTOMATED: CPT

## 2025-08-25 PROCEDURE — 2580000003 HC RX 258

## 2025-08-25 RX ORDER — BUMETANIDE 1 MG/1
2 TABLET ORAL 2 TIMES DAILY
Status: DISCONTINUED | OUTPATIENT
Start: 2025-08-25 | End: 2025-08-30 | Stop reason: HOSPADM

## 2025-08-25 RX ADMIN — FLUTICASONE PROPIONATE 2 SPRAY: 50 SPRAY, METERED NASAL at 20:02

## 2025-08-25 RX ADMIN — SODIUM CHLORIDE, PRESERVATIVE FREE 10 ML: 5 INJECTION INTRAVENOUS at 08:06

## 2025-08-25 RX ADMIN — MICONAZOLE NITRATE: 20 POWDER TOPICAL at 20:01

## 2025-08-25 RX ADMIN — CETIRIZINE HYDROCHLORIDE 10 MG: 10 TABLET, FILM COATED ORAL at 08:05

## 2025-08-25 RX ADMIN — BUMETANIDE 1 MG/HR: 0.25 INJECTION INTRAMUSCULAR; INTRAVENOUS at 09:05

## 2025-08-25 RX ADMIN — LEVOTHYROXINE SODIUM 150 MCG: 0.15 TABLET ORAL at 05:37

## 2025-08-25 RX ADMIN — FONDAPARINUX SODIUM 2.5 MG: 2.5 INJECTION, SOLUTION SUBCUTANEOUS at 08:06

## 2025-08-25 RX ADMIN — PANTOPRAZOLE SODIUM 40 MG: 40 TABLET, DELAYED RELEASE ORAL at 05:37

## 2025-08-25 RX ADMIN — IPRATROPIUM BROMIDE 0.5 MG: 0.5 SOLUTION RESPIRATORY (INHALATION) at 08:51

## 2025-08-25 RX ADMIN — IPRATROPIUM BROMIDE 0.5 MG: 0.5 SOLUTION RESPIRATORY (INHALATION) at 20:57

## 2025-08-25 RX ADMIN — FERROUS SULFATE TAB 325 MG (65 MG ELEMENTAL FE) 325 MG: 325 (65 FE) TAB at 08:05

## 2025-08-25 RX ADMIN — MICONAZOLE NITRATE: 20 POWDER TOPICAL at 08:08

## 2025-08-25 RX ADMIN — CEFTRIAXONE SODIUM 2000 MG: 2 INJECTION, POWDER, FOR SOLUTION INTRAMUSCULAR; INTRAVENOUS at 18:27

## 2025-08-25 RX ADMIN — SERTRALINE 50 MG: 50 TABLET, FILM COATED ORAL at 08:05

## 2025-08-25 RX ADMIN — BUDESONIDE INHALATION 1000 MCG: 0.5 SUSPENSION RESPIRATORY (INHALATION) at 20:57

## 2025-08-25 RX ADMIN — BUMETANIDE 2 MG: 1 TABLET ORAL at 18:21

## 2025-08-25 RX ADMIN — ASPIRIN 81 MG: 81 TABLET, CHEWABLE ORAL at 08:07

## 2025-08-25 RX ADMIN — ARFORMOTEROL TARTRATE 15 MCG: 15 SOLUTION RESPIRATORY (INHALATION) at 20:57

## 2025-08-25 RX ADMIN — POTASSIUM CHLORIDE 10 MEQ: 750 TABLET, EXTENDED RELEASE ORAL at 08:05

## 2025-08-25 RX ADMIN — BUMETANIDE 2 MG: 1 TABLET ORAL at 10:02

## 2025-08-25 RX ADMIN — LEVETIRACETAM 750 MG: 500 TABLET, FILM COATED ORAL at 20:01

## 2025-08-25 RX ADMIN — LEVETIRACETAM 750 MG: 500 TABLET, FILM COATED ORAL at 08:05

## 2025-08-25 RX ADMIN — BUDESONIDE INHALATION 1000 MCG: 0.5 SUSPENSION RESPIRATORY (INHALATION) at 08:51

## 2025-08-25 RX ADMIN — FLUTICASONE PROPIONATE 2 SPRAY: 50 SPRAY, METERED NASAL at 08:08

## 2025-08-25 RX ADMIN — MONTELUKAST 10 MG: 10 TABLET, FILM COATED ORAL at 08:06

## 2025-08-25 RX ADMIN — ARFORMOTEROL TARTRATE 15 MCG: 15 SOLUTION RESPIRATORY (INHALATION) at 08:51

## 2025-08-25 RX ADMIN — SODIUM CHLORIDE, PRESERVATIVE FREE 10 ML: 5 INJECTION INTRAVENOUS at 20:01

## 2025-08-25 RX ADMIN — ROSUVASTATIN CALCIUM 40 MG: 20 TABLET, FILM COATED ORAL at 20:01

## 2025-08-25 ASSESSMENT — PAIN SCALES - GENERAL
PAINLEVEL_OUTOF10: 0

## 2025-08-26 PROCEDURE — 6370000000 HC RX 637 (ALT 250 FOR IP): Performed by: INTERNAL MEDICINE

## 2025-08-26 PROCEDURE — 94761 N-INVAS EAR/PLS OXIMETRY MLT: CPT

## 2025-08-26 PROCEDURE — 94760 N-INVAS EAR/PLS OXIMETRY 1: CPT

## 2025-08-26 PROCEDURE — 94640 AIRWAY INHALATION TREATMENT: CPT

## 2025-08-26 PROCEDURE — 6370000000 HC RX 637 (ALT 250 FOR IP): Performed by: CASE MANAGER/CARE COORDINATOR

## 2025-08-26 PROCEDURE — 6370000000 HC RX 637 (ALT 250 FOR IP)

## 2025-08-26 PROCEDURE — 2500000003 HC RX 250 WO HCPCS: Performed by: PHYSICIAN ASSISTANT

## 2025-08-26 PROCEDURE — 2140000000 HC CCU INTERMEDIATE R&B

## 2025-08-26 PROCEDURE — 6360000002 HC RX W HCPCS: Performed by: INTERNAL MEDICINE

## 2025-08-26 PROCEDURE — 6370000000 HC RX 637 (ALT 250 FOR IP): Performed by: NURSE PRACTITIONER

## 2025-08-26 PROCEDURE — 2700000000 HC OXYGEN THERAPY PER DAY

## 2025-08-26 PROCEDURE — 94660 CPAP INITIATION&MGMT: CPT

## 2025-08-26 PROCEDURE — 99232 SBSQ HOSP IP/OBS MODERATE 35: CPT | Performed by: INTERNAL MEDICINE

## 2025-08-26 PROCEDURE — 6370000000 HC RX 637 (ALT 250 FOR IP): Performed by: PHYSICIAN ASSISTANT

## 2025-08-26 RX ADMIN — ARFORMOTEROL TARTRATE 15 MCG: 15 SOLUTION RESPIRATORY (INHALATION) at 09:05

## 2025-08-26 RX ADMIN — ROSUVASTATIN CALCIUM 40 MG: 20 TABLET, FILM COATED ORAL at 20:28

## 2025-08-26 RX ADMIN — FLUTICASONE PROPIONATE 2 SPRAY: 50 SPRAY, METERED NASAL at 20:30

## 2025-08-26 RX ADMIN — FERROUS SULFATE TAB 325 MG (65 MG ELEMENTAL FE) 325 MG: 325 (65 FE) TAB at 07:32

## 2025-08-26 RX ADMIN — BUDESONIDE INHALATION 1000 MCG: 0.5 SUSPENSION RESPIRATORY (INHALATION) at 09:16

## 2025-08-26 RX ADMIN — SERTRALINE 50 MG: 50 TABLET, FILM COATED ORAL at 07:32

## 2025-08-26 RX ADMIN — POTASSIUM CHLORIDE 10 MEQ: 750 TABLET, EXTENDED RELEASE ORAL at 07:32

## 2025-08-26 RX ADMIN — BUDESONIDE INHALATION 1000 MCG: 0.5 SUSPENSION RESPIRATORY (INHALATION) at 21:39

## 2025-08-26 RX ADMIN — SODIUM CHLORIDE, PRESERVATIVE FREE 10 ML: 5 INJECTION INTRAVENOUS at 07:32

## 2025-08-26 RX ADMIN — IPRATROPIUM BROMIDE 0.5 MG: 0.5 SOLUTION RESPIRATORY (INHALATION) at 21:39

## 2025-08-26 RX ADMIN — ASPIRIN 81 MG: 81 TABLET, CHEWABLE ORAL at 07:32

## 2025-08-26 RX ADMIN — FLUTICASONE PROPIONATE 2 SPRAY: 50 SPRAY, METERED NASAL at 07:32

## 2025-08-26 RX ADMIN — IPRATROPIUM BROMIDE 0.5 MG: 0.5 SOLUTION RESPIRATORY (INHALATION) at 15:48

## 2025-08-26 RX ADMIN — PANTOPRAZOLE SODIUM 40 MG: 40 TABLET, DELAYED RELEASE ORAL at 04:28

## 2025-08-26 RX ADMIN — LEVOTHYROXINE SODIUM 150 MCG: 0.15 TABLET ORAL at 04:28

## 2025-08-26 RX ADMIN — FONDAPARINUX SODIUM 2.5 MG: 2.5 INJECTION, SOLUTION SUBCUTANEOUS at 07:32

## 2025-08-26 RX ADMIN — SODIUM CHLORIDE, PRESERVATIVE FREE 10 ML: 5 INJECTION INTRAVENOUS at 20:28

## 2025-08-26 RX ADMIN — BUMETANIDE 2 MG: 1 TABLET ORAL at 16:31

## 2025-08-26 RX ADMIN — MONTELUKAST 10 MG: 10 TABLET, FILM COATED ORAL at 07:32

## 2025-08-26 RX ADMIN — CETIRIZINE HYDROCHLORIDE 10 MG: 10 TABLET, FILM COATED ORAL at 07:32

## 2025-08-26 RX ADMIN — MICONAZOLE NITRATE: 20 POWDER TOPICAL at 07:32

## 2025-08-26 RX ADMIN — LEVETIRACETAM 750 MG: 500 TABLET, FILM COATED ORAL at 20:29

## 2025-08-26 RX ADMIN — MICONAZOLE NITRATE: 20 POWDER TOPICAL at 20:30

## 2025-08-26 RX ADMIN — GUAIFENESIN AND CODEINE PHOSPHATE 5 ML: 100; 10 SOLUTION ORAL at 00:13

## 2025-08-26 RX ADMIN — IPRATROPIUM BROMIDE 0.5 MG: 0.5 SOLUTION RESPIRATORY (INHALATION) at 09:05

## 2025-08-26 RX ADMIN — BUMETANIDE 2 MG: 1 TABLET ORAL at 07:32

## 2025-08-26 RX ADMIN — LEVETIRACETAM 750 MG: 500 TABLET, FILM COATED ORAL at 07:32

## 2025-08-26 ASSESSMENT — PAIN SCALES - GENERAL
PAINLEVEL_OUTOF10: 0

## 2025-08-27 LAB
ANION GAP SERPL CALC-SCNC: 12 MMOL/L (ref 7–16)
BUN SERPL-MCNC: 36 MG/DL (ref 8–23)
CALCIUM SERPL-MCNC: 8.9 MG/DL (ref 8.8–10.2)
CHLORIDE SERPL-SCNC: 99 MMOL/L (ref 98–107)
CO2 SERPL-SCNC: 30 MMOL/L (ref 20–29)
CREAT SERPL-MCNC: 1.77 MG/DL (ref 0.6–1.1)
GLUCOSE SERPL-MCNC: 101 MG/DL (ref 70–99)
POTASSIUM SERPL-SCNC: 3.6 MMOL/L (ref 3.5–5.1)
SODIUM SERPL-SCNC: 141 MMOL/L (ref 136–145)

## 2025-08-27 PROCEDURE — 6360000002 HC RX W HCPCS: Performed by: INTERNAL MEDICINE

## 2025-08-27 PROCEDURE — 6370000000 HC RX 637 (ALT 250 FOR IP): Performed by: INTERNAL MEDICINE

## 2025-08-27 PROCEDURE — 36415 COLL VENOUS BLD VENIPUNCTURE: CPT

## 2025-08-27 PROCEDURE — 94761 N-INVAS EAR/PLS OXIMETRY MLT: CPT

## 2025-08-27 PROCEDURE — 97112 NEUROMUSCULAR REEDUCATION: CPT

## 2025-08-27 PROCEDURE — 6370000000 HC RX 637 (ALT 250 FOR IP): Performed by: PHYSICIAN ASSISTANT

## 2025-08-27 PROCEDURE — 6370000000 HC RX 637 (ALT 250 FOR IP): Performed by: NURSE PRACTITIONER

## 2025-08-27 PROCEDURE — 6370000000 HC RX 637 (ALT 250 FOR IP)

## 2025-08-27 PROCEDURE — 97530 THERAPEUTIC ACTIVITIES: CPT

## 2025-08-27 PROCEDURE — 80048 BASIC METABOLIC PNL TOTAL CA: CPT

## 2025-08-27 PROCEDURE — 2500000003 HC RX 250 WO HCPCS: Performed by: PHYSICIAN ASSISTANT

## 2025-08-27 PROCEDURE — 2140000000 HC CCU INTERMEDIATE R&B

## 2025-08-27 PROCEDURE — 2700000000 HC OXYGEN THERAPY PER DAY

## 2025-08-27 PROCEDURE — 6370000000 HC RX 637 (ALT 250 FOR IP): Performed by: CASE MANAGER/CARE COORDINATOR

## 2025-08-27 PROCEDURE — 97535 SELF CARE MNGMENT TRAINING: CPT

## 2025-08-27 PROCEDURE — 94760 N-INVAS EAR/PLS OXIMETRY 1: CPT

## 2025-08-27 PROCEDURE — 94640 AIRWAY INHALATION TREATMENT: CPT

## 2025-08-27 RX ORDER — BUDESONIDE 0.5 MG/2ML
1 INHALANT ORAL
Status: DISCONTINUED | OUTPATIENT
Start: 2025-08-27 | End: 2025-08-30 | Stop reason: HOSPADM

## 2025-08-27 RX ORDER — LANOLIN ALCOHOL/MO/W.PET/CERES
400 CREAM (GRAM) TOPICAL 2 TIMES DAILY
Status: DISCONTINUED | OUTPATIENT
Start: 2025-08-27 | End: 2025-08-30 | Stop reason: HOSPADM

## 2025-08-27 RX ORDER — METAXALONE 800 MG/1
400 TABLET ORAL ONCE
Status: COMPLETED | OUTPATIENT
Start: 2025-08-27 | End: 2025-08-27

## 2025-08-27 RX ORDER — ARFORMOTEROL TARTRATE 15 UG/2ML
15 SOLUTION RESPIRATORY (INHALATION)
Status: DISCONTINUED | OUTPATIENT
Start: 2025-08-27 | End: 2025-08-30 | Stop reason: HOSPADM

## 2025-08-27 RX ADMIN — MONTELUKAST 10 MG: 10 TABLET, FILM COATED ORAL at 08:25

## 2025-08-27 RX ADMIN — MICONAZOLE NITRATE: 20 POWDER TOPICAL at 21:30

## 2025-08-27 RX ADMIN — FERROUS SULFATE TAB 325 MG (65 MG ELEMENTAL FE) 325 MG: 325 (65 FE) TAB at 08:25

## 2025-08-27 RX ADMIN — BUMETANIDE 2 MG: 1 TABLET ORAL at 17:45

## 2025-08-27 RX ADMIN — MAGNESIUM GLUCONATE 500 MG ORAL TABLET 400 MG: 500 TABLET ORAL at 14:39

## 2025-08-27 RX ADMIN — POTASSIUM CHLORIDE 10 MEQ: 750 TABLET, EXTENDED RELEASE ORAL at 08:25

## 2025-08-27 RX ADMIN — FONDAPARINUX SODIUM 2.5 MG: 2.5 INJECTION, SOLUTION SUBCUTANEOUS at 08:26

## 2025-08-27 RX ADMIN — ROSUVASTATIN CALCIUM 40 MG: 20 TABLET, FILM COATED ORAL at 21:27

## 2025-08-27 RX ADMIN — ARFORMOTEROL TARTRATE 15 MCG: 15 SOLUTION RESPIRATORY (INHALATION) at 20:57

## 2025-08-27 RX ADMIN — LEVETIRACETAM 750 MG: 500 TABLET, FILM COATED ORAL at 08:25

## 2025-08-27 RX ADMIN — BUMETANIDE 2 MG: 1 TABLET ORAL at 08:25

## 2025-08-27 RX ADMIN — SODIUM CHLORIDE, PRESERVATIVE FREE 10 ML: 5 INJECTION INTRAVENOUS at 08:27

## 2025-08-27 RX ADMIN — GUAIFENESIN AND CODEINE PHOSPHATE 5 ML: 100; 10 SOLUTION ORAL at 21:27

## 2025-08-27 RX ADMIN — IPRATROPIUM BROMIDE 0.5 MG: 0.5 SOLUTION RESPIRATORY (INHALATION) at 20:57

## 2025-08-27 RX ADMIN — MICONAZOLE NITRATE: 20 POWDER TOPICAL at 08:26

## 2025-08-27 RX ADMIN — LEVOTHYROXINE SODIUM 150 MCG: 0.15 TABLET ORAL at 06:27

## 2025-08-27 RX ADMIN — SODIUM CHLORIDE, PRESERVATIVE FREE 10 ML: 5 INJECTION INTRAVENOUS at 21:31

## 2025-08-27 RX ADMIN — ARFORMOTEROL TARTRATE 15 MCG: 15 SOLUTION RESPIRATORY (INHALATION) at 09:30

## 2025-08-27 RX ADMIN — FLUTICASONE PROPIONATE 2 SPRAY: 50 SPRAY, METERED NASAL at 21:30

## 2025-08-27 RX ADMIN — LEVETIRACETAM 750 MG: 500 TABLET, FILM COATED ORAL at 21:27

## 2025-08-27 RX ADMIN — BUDESONIDE INHALATION 1000 MCG: 0.5 SUSPENSION RESPIRATORY (INHALATION) at 09:30

## 2025-08-27 RX ADMIN — PANTOPRAZOLE SODIUM 40 MG: 40 TABLET, DELAYED RELEASE ORAL at 06:27

## 2025-08-27 RX ADMIN — BUDESONIDE INHALATION 1000 MCG: 0.5 SUSPENSION RESPIRATORY (INHALATION) at 20:57

## 2025-08-27 RX ADMIN — SERTRALINE 50 MG: 50 TABLET, FILM COATED ORAL at 08:25

## 2025-08-27 RX ADMIN — CETIRIZINE HYDROCHLORIDE 10 MG: 10 TABLET, FILM COATED ORAL at 08:25

## 2025-08-27 RX ADMIN — IPRATROPIUM BROMIDE 0.5 MG: 0.5 SOLUTION RESPIRATORY (INHALATION) at 09:30

## 2025-08-27 RX ADMIN — MAGNESIUM GLUCONATE 500 MG ORAL TABLET 400 MG: 500 TABLET ORAL at 21:27

## 2025-08-27 RX ADMIN — METAXALONE 400 MG: 800 TABLET ORAL at 14:39

## 2025-08-27 RX ADMIN — FLUTICASONE PROPIONATE 2 SPRAY: 50 SPRAY, METERED NASAL at 08:26

## 2025-08-27 RX ADMIN — ASPIRIN 81 MG: 81 TABLET, CHEWABLE ORAL at 08:26

## 2025-08-27 ASSESSMENT — PAIN SCALES - GENERAL
PAINLEVEL_OUTOF10: 0

## 2025-08-28 LAB
ANION GAP SERPL CALC-SCNC: 10 MMOL/L (ref 7–16)
BUN SERPL-MCNC: 40 MG/DL (ref 8–23)
CALCIUM SERPL-MCNC: 9 MG/DL (ref 8.8–10.2)
CHLORIDE SERPL-SCNC: 100 MMOL/L (ref 98–107)
CO2 SERPL-SCNC: 30 MMOL/L (ref 20–29)
CREAT SERPL-MCNC: 1.75 MG/DL (ref 0.6–1.1)
GLUCOSE SERPL-MCNC: 102 MG/DL (ref 70–99)
POTASSIUM SERPL-SCNC: 3.5 MMOL/L (ref 3.5–5.1)
SODIUM SERPL-SCNC: 140 MMOL/L (ref 136–145)

## 2025-08-28 PROCEDURE — 6370000000 HC RX 637 (ALT 250 FOR IP): Performed by: INTERNAL MEDICINE

## 2025-08-28 PROCEDURE — 51798 US URINE CAPACITY MEASURE: CPT

## 2025-08-28 PROCEDURE — 2700000000 HC OXYGEN THERAPY PER DAY

## 2025-08-28 PROCEDURE — 6370000000 HC RX 637 (ALT 250 FOR IP)

## 2025-08-28 PROCEDURE — 6370000000 HC RX 637 (ALT 250 FOR IP): Performed by: NURSE PRACTITIONER

## 2025-08-28 PROCEDURE — 2500000003 HC RX 250 WO HCPCS: Performed by: PHYSICIAN ASSISTANT

## 2025-08-28 PROCEDURE — 6370000000 HC RX 637 (ALT 250 FOR IP): Performed by: PHYSICIAN ASSISTANT

## 2025-08-28 PROCEDURE — 99231 SBSQ HOSP IP/OBS SF/LOW 25: CPT | Performed by: INTERNAL MEDICINE

## 2025-08-28 PROCEDURE — 94761 N-INVAS EAR/PLS OXIMETRY MLT: CPT

## 2025-08-28 PROCEDURE — 6360000002 HC RX W HCPCS: Performed by: INTERNAL MEDICINE

## 2025-08-28 PROCEDURE — 80048 BASIC METABOLIC PNL TOTAL CA: CPT

## 2025-08-28 PROCEDURE — 2140000000 HC CCU INTERMEDIATE R&B

## 2025-08-28 PROCEDURE — 94760 N-INVAS EAR/PLS OXIMETRY 1: CPT

## 2025-08-28 PROCEDURE — 36415 COLL VENOUS BLD VENIPUNCTURE: CPT

## 2025-08-28 PROCEDURE — 94640 AIRWAY INHALATION TREATMENT: CPT

## 2025-08-28 RX ADMIN — MICONAZOLE NITRATE: 20 POWDER TOPICAL at 08:43

## 2025-08-28 RX ADMIN — CETIRIZINE HYDROCHLORIDE 10 MG: 10 TABLET, FILM COATED ORAL at 08:40

## 2025-08-28 RX ADMIN — LEVETIRACETAM 750 MG: 500 TABLET, FILM COATED ORAL at 20:29

## 2025-08-28 RX ADMIN — MONTELUKAST 10 MG: 10 TABLET, FILM COATED ORAL at 08:40

## 2025-08-28 RX ADMIN — SODIUM CHLORIDE, PRESERVATIVE FREE 10 ML: 5 INJECTION INTRAVENOUS at 20:31

## 2025-08-28 RX ADMIN — PANTOPRAZOLE SODIUM 40 MG: 40 TABLET, DELAYED RELEASE ORAL at 05:46

## 2025-08-28 RX ADMIN — SODIUM CHLORIDE, PRESERVATIVE FREE 10 ML: 5 INJECTION INTRAVENOUS at 08:42

## 2025-08-28 RX ADMIN — ASPIRIN 81 MG: 81 TABLET, CHEWABLE ORAL at 08:42

## 2025-08-28 RX ADMIN — FERROUS SULFATE TAB 325 MG (65 MG ELEMENTAL FE) 325 MG: 325 (65 FE) TAB at 08:40

## 2025-08-28 RX ADMIN — ARFORMOTEROL TARTRATE 15 MCG: 15 SOLUTION RESPIRATORY (INHALATION) at 20:54

## 2025-08-28 RX ADMIN — LEVETIRACETAM 750 MG: 500 TABLET, FILM COATED ORAL at 08:40

## 2025-08-28 RX ADMIN — SERTRALINE 50 MG: 50 TABLET, FILM COATED ORAL at 08:40

## 2025-08-28 RX ADMIN — MAGNESIUM GLUCONATE 500 MG ORAL TABLET 400 MG: 500 TABLET ORAL at 20:29

## 2025-08-28 RX ADMIN — FLUTICASONE PROPIONATE 2 SPRAY: 50 SPRAY, METERED NASAL at 08:42

## 2025-08-28 RX ADMIN — LEVOTHYROXINE SODIUM 150 MCG: 0.15 TABLET ORAL at 05:46

## 2025-08-28 RX ADMIN — FONDAPARINUX SODIUM 2.5 MG: 2.5 INJECTION, SOLUTION SUBCUTANEOUS at 08:44

## 2025-08-28 RX ADMIN — BUMETANIDE 2 MG: 1 TABLET ORAL at 17:01

## 2025-08-28 RX ADMIN — ARFORMOTEROL TARTRATE 15 MCG: 15 SOLUTION RESPIRATORY (INHALATION) at 07:49

## 2025-08-28 RX ADMIN — MAGNESIUM GLUCONATE 500 MG ORAL TABLET 400 MG: 500 TABLET ORAL at 08:40

## 2025-08-28 RX ADMIN — IPRATROPIUM BROMIDE 0.5 MG: 0.5 SOLUTION RESPIRATORY (INHALATION) at 07:49

## 2025-08-28 RX ADMIN — IPRATROPIUM BROMIDE 0.5 MG: 0.5 SOLUTION RESPIRATORY (INHALATION) at 20:54

## 2025-08-28 RX ADMIN — BUDESONIDE INHALATION 1000 MCG: 0.5 SUSPENSION RESPIRATORY (INHALATION) at 20:54

## 2025-08-28 RX ADMIN — BUDESONIDE INHALATION 1000 MCG: 0.5 SUSPENSION RESPIRATORY (INHALATION) at 07:49

## 2025-08-28 RX ADMIN — BUMETANIDE 2 MG: 1 TABLET ORAL at 08:40

## 2025-08-28 RX ADMIN — POTASSIUM CHLORIDE 10 MEQ: 750 TABLET, EXTENDED RELEASE ORAL at 08:40

## 2025-08-28 RX ADMIN — ROSUVASTATIN CALCIUM 40 MG: 20 TABLET, FILM COATED ORAL at 20:29

## 2025-08-28 ASSESSMENT — PAIN SCALES - GENERAL
PAINLEVEL_OUTOF10: 0

## 2025-08-29 LAB — NT PRO BNP: 7546 PG/ML (ref 0–450)

## 2025-08-29 PROCEDURE — 97530 THERAPEUTIC ACTIVITIES: CPT

## 2025-08-29 PROCEDURE — 36415 COLL VENOUS BLD VENIPUNCTURE: CPT

## 2025-08-29 PROCEDURE — 6370000000 HC RX 637 (ALT 250 FOR IP): Performed by: PHYSICIAN ASSISTANT

## 2025-08-29 PROCEDURE — 99232 SBSQ HOSP IP/OBS MODERATE 35: CPT | Performed by: INTERNAL MEDICINE

## 2025-08-29 PROCEDURE — 6370000000 HC RX 637 (ALT 250 FOR IP): Performed by: INTERNAL MEDICINE

## 2025-08-29 PROCEDURE — 6370000000 HC RX 637 (ALT 250 FOR IP): Performed by: NURSE PRACTITIONER

## 2025-08-29 PROCEDURE — 2500000003 HC RX 250 WO HCPCS: Performed by: PHYSICIAN ASSISTANT

## 2025-08-29 PROCEDURE — 6370000000 HC RX 637 (ALT 250 FOR IP)

## 2025-08-29 PROCEDURE — 94761 N-INVAS EAR/PLS OXIMETRY MLT: CPT

## 2025-08-29 PROCEDURE — 94640 AIRWAY INHALATION TREATMENT: CPT

## 2025-08-29 PROCEDURE — 6360000002 HC RX W HCPCS: Performed by: INTERNAL MEDICINE

## 2025-08-29 PROCEDURE — 2700000000 HC OXYGEN THERAPY PER DAY

## 2025-08-29 PROCEDURE — 83880 ASSAY OF NATRIURETIC PEPTIDE: CPT

## 2025-08-29 PROCEDURE — 97112 NEUROMUSCULAR REEDUCATION: CPT

## 2025-08-29 PROCEDURE — 2140000000 HC CCU INTERMEDIATE R&B

## 2025-08-29 RX ORDER — METAXALONE 800 MG/1
800 TABLET ORAL EVERY 12 HOURS PRN
Status: DISCONTINUED | OUTPATIENT
Start: 2025-08-29 | End: 2025-08-30 | Stop reason: HOSPADM

## 2025-08-29 RX ADMIN — ROSUVASTATIN CALCIUM 40 MG: 20 TABLET, FILM COATED ORAL at 21:09

## 2025-08-29 RX ADMIN — FONDAPARINUX SODIUM 2.5 MG: 2.5 INJECTION, SOLUTION SUBCUTANEOUS at 08:10

## 2025-08-29 RX ADMIN — BUMETANIDE 2 MG: 1 TABLET ORAL at 17:59

## 2025-08-29 RX ADMIN — SODIUM CHLORIDE, PRESERVATIVE FREE 10 ML: 5 INJECTION INTRAVENOUS at 21:12

## 2025-08-29 RX ADMIN — MAGNESIUM GLUCONATE 500 MG ORAL TABLET 400 MG: 500 TABLET ORAL at 21:09

## 2025-08-29 RX ADMIN — MICONAZOLE NITRATE: 20 POWDER TOPICAL at 08:10

## 2025-08-29 RX ADMIN — LEVETIRACETAM 750 MG: 500 TABLET, FILM COATED ORAL at 08:09

## 2025-08-29 RX ADMIN — IPRATROPIUM BROMIDE 0.5 MG: 0.5 SOLUTION RESPIRATORY (INHALATION) at 20:16

## 2025-08-29 RX ADMIN — FERROUS SULFATE TAB 325 MG (65 MG ELEMENTAL FE) 325 MG: 325 (65 FE) TAB at 08:09

## 2025-08-29 RX ADMIN — SERTRALINE 50 MG: 50 TABLET, FILM COATED ORAL at 08:10

## 2025-08-29 RX ADMIN — MICONAZOLE NITRATE: 20 POWDER TOPICAL at 05:14

## 2025-08-29 RX ADMIN — CETIRIZINE HYDROCHLORIDE 10 MG: 10 TABLET, FILM COATED ORAL at 08:09

## 2025-08-29 RX ADMIN — ASPIRIN 81 MG: 81 TABLET, CHEWABLE ORAL at 08:10

## 2025-08-29 RX ADMIN — SODIUM CHLORIDE, PRESERVATIVE FREE 10 ML: 5 INJECTION INTRAVENOUS at 08:10

## 2025-08-29 RX ADMIN — LEVETIRACETAM 750 MG: 500 TABLET, FILM COATED ORAL at 21:09

## 2025-08-29 RX ADMIN — FLUTICASONE PROPIONATE 2 SPRAY: 50 SPRAY, METERED NASAL at 21:10

## 2025-08-29 RX ADMIN — METAXALONE 800 MG: 800 TABLET ORAL at 21:09

## 2025-08-29 RX ADMIN — ARFORMOTEROL TARTRATE 15 MCG: 15 SOLUTION RESPIRATORY (INHALATION) at 20:16

## 2025-08-29 RX ADMIN — IPRATROPIUM BROMIDE 0.5 MG: 0.5 SOLUTION RESPIRATORY (INHALATION) at 07:30

## 2025-08-29 RX ADMIN — ARFORMOTEROL TARTRATE 15 MCG: 15 SOLUTION RESPIRATORY (INHALATION) at 07:30

## 2025-08-29 RX ADMIN — BUDESONIDE INHALATION 1000 MCG: 0.5 SUSPENSION RESPIRATORY (INHALATION) at 20:16

## 2025-08-29 RX ADMIN — POTASSIUM CHLORIDE 10 MEQ: 750 TABLET, EXTENDED RELEASE ORAL at 08:09

## 2025-08-29 RX ADMIN — FLUTICASONE PROPIONATE 2 SPRAY: 50 SPRAY, METERED NASAL at 08:10

## 2025-08-29 RX ADMIN — BUMETANIDE 2 MG: 1 TABLET ORAL at 08:09

## 2025-08-29 RX ADMIN — MONTELUKAST 10 MG: 10 TABLET, FILM COATED ORAL at 08:09

## 2025-08-29 RX ADMIN — MAGNESIUM GLUCONATE 500 MG ORAL TABLET 400 MG: 500 TABLET ORAL at 08:09

## 2025-08-29 RX ADMIN — PANTOPRAZOLE SODIUM 40 MG: 40 TABLET, DELAYED RELEASE ORAL at 06:12

## 2025-08-29 RX ADMIN — BUDESONIDE INHALATION 1000 MCG: 0.5 SUSPENSION RESPIRATORY (INHALATION) at 07:29

## 2025-08-29 RX ADMIN — LEVOTHYROXINE SODIUM 150 MCG: 0.15 TABLET ORAL at 06:13

## 2025-08-29 ASSESSMENT — PAIN SCALES - GENERAL
PAINLEVEL_OUTOF10: 0

## 2025-08-30 VITALS
TEMPERATURE: 97.3 F | WEIGHT: 265.21 LBS | BODY MASS INDEX: 52.07 KG/M2 | SYSTOLIC BLOOD PRESSURE: 113 MMHG | HEART RATE: 85 BPM | DIASTOLIC BLOOD PRESSURE: 68 MMHG | HEIGHT: 60 IN | RESPIRATION RATE: 18 BRPM | OXYGEN SATURATION: 94 %

## 2025-08-30 PROBLEM — I50.31 ACUTE DIASTOLIC HEART FAILURE WITH PRESERVED EJECTION FRACTION (HCC): Status: RESOLVED | Noted: 2025-08-20 | Resolved: 2025-08-30

## 2025-08-30 PROBLEM — I50.33 ACUTE ON CHRONIC DIASTOLIC CONGESTIVE HEART FAILURE (HCC): Status: RESOLVED | Noted: 2017-06-27 | Resolved: 2025-08-30

## 2025-08-30 LAB
ANION GAP SERPL CALC-SCNC: 12 MMOL/L (ref 7–16)
BASOPHILS # BLD: 0.04 K/UL (ref 0–0.2)
BASOPHILS NFR BLD: 0.8 % (ref 0–2)
BUN SERPL-MCNC: 49 MG/DL (ref 8–23)
CALCIUM SERPL-MCNC: 9 MG/DL (ref 8.8–10.2)
CHLORIDE SERPL-SCNC: 99 MMOL/L (ref 98–107)
CO2 SERPL-SCNC: 28 MMOL/L (ref 20–29)
CREAT SERPL-MCNC: 1.99 MG/DL (ref 0.6–1.1)
DIFFERENTIAL METHOD BLD: ABNORMAL
EOSINOPHIL # BLD: 0.24 K/UL (ref 0–0.8)
EOSINOPHIL NFR BLD: 4.5 % (ref 0.5–7.8)
ERYTHROCYTE [DISTWIDTH] IN BLOOD BY AUTOMATED COUNT: 16.2 % (ref 11.9–14.6)
GLUCOSE SERPL-MCNC: 106 MG/DL (ref 70–99)
HCT VFR BLD AUTO: 32.3 % (ref 35.8–46.3)
HGB BLD-MCNC: 10.4 G/DL (ref 11.7–15.4)
IMM GRANULOCYTES # BLD AUTO: 0.02 K/UL (ref 0–0.5)
IMM GRANULOCYTES NFR BLD AUTO: 0.4 % (ref 0–5)
LYMPHOCYTES # BLD: 0.87 K/UL (ref 0.5–4.6)
LYMPHOCYTES NFR BLD: 16.4 % (ref 13–44)
MCH RBC QN AUTO: 29.2 PG (ref 26.1–32.9)
MCHC RBC AUTO-ENTMCNC: 32.2 G/DL (ref 31.4–35)
MCV RBC AUTO: 90.7 FL (ref 82–102)
MONOCYTES # BLD: 0.63 K/UL (ref 0.1–1.3)
MONOCYTES NFR BLD: 11.9 % (ref 4–12)
NEUTS SEG # BLD: 3.49 K/UL (ref 1.7–8.2)
NEUTS SEG NFR BLD: 66 % (ref 43–78)
NRBC # BLD: 0 K/UL (ref 0–0.2)
PLATELET # BLD AUTO: 142 K/UL (ref 150–450)
PMV BLD AUTO: 13 FL (ref 9.4–12.3)
POTASSIUM SERPL-SCNC: 3.9 MMOL/L (ref 3.5–5.1)
RBC # BLD AUTO: 3.56 M/UL (ref 4.05–5.2)
SODIUM SERPL-SCNC: 138 MMOL/L (ref 136–145)
WBC # BLD AUTO: 5.3 K/UL (ref 4.3–11.1)

## 2025-08-30 PROCEDURE — 6360000002 HC RX W HCPCS: Performed by: INTERNAL MEDICINE

## 2025-08-30 PROCEDURE — 6370000000 HC RX 637 (ALT 250 FOR IP): Performed by: NURSE PRACTITIONER

## 2025-08-30 PROCEDURE — 6370000000 HC RX 637 (ALT 250 FOR IP)

## 2025-08-30 PROCEDURE — 99239 HOSP IP/OBS DSCHRG MGMT >30: CPT | Performed by: INTERNAL MEDICINE

## 2025-08-30 PROCEDURE — 6370000000 HC RX 637 (ALT 250 FOR IP): Performed by: INTERNAL MEDICINE

## 2025-08-30 PROCEDURE — 85025 COMPLETE CBC W/AUTO DIFF WBC: CPT

## 2025-08-30 PROCEDURE — 6370000000 HC RX 637 (ALT 250 FOR IP): Performed by: PHYSICIAN ASSISTANT

## 2025-08-30 PROCEDURE — 2500000003 HC RX 250 WO HCPCS: Performed by: PHYSICIAN ASSISTANT

## 2025-08-30 PROCEDURE — 80048 BASIC METABOLIC PNL TOTAL CA: CPT

## 2025-08-30 PROCEDURE — 94640 AIRWAY INHALATION TREATMENT: CPT

## 2025-08-30 PROCEDURE — 94761 N-INVAS EAR/PLS OXIMETRY MLT: CPT

## 2025-08-30 PROCEDURE — 36415 COLL VENOUS BLD VENIPUNCTURE: CPT

## 2025-08-30 RX ORDER — LANOLIN ALCOHOL/MO/W.PET/CERES
400 CREAM (GRAM) TOPICAL 2 TIMES DAILY
Qty: 180 TABLET | Refills: 2 | Status: SHIPPED | OUTPATIENT
Start: 2025-08-30

## 2025-08-30 RX ORDER — FERROUS SULFATE 325(65) MG
325 TABLET ORAL
Qty: 30 TABLET | Refills: 3 | Status: SHIPPED | OUTPATIENT
Start: 2025-08-30

## 2025-08-30 RX ADMIN — POTASSIUM CHLORIDE 10 MEQ: 750 TABLET, EXTENDED RELEASE ORAL at 08:08

## 2025-08-30 RX ADMIN — ACETAMINOPHEN 650 MG: 325 TABLET ORAL at 00:52

## 2025-08-30 RX ADMIN — LEVOTHYROXINE SODIUM 150 MCG: 0.15 TABLET ORAL at 08:08

## 2025-08-30 RX ADMIN — BUDESONIDE INHALATION 1000 MCG: 0.5 SUSPENSION RESPIRATORY (INHALATION) at 07:52

## 2025-08-30 RX ADMIN — ASPIRIN 81 MG: 81 TABLET, CHEWABLE ORAL at 08:08

## 2025-08-30 RX ADMIN — PANTOPRAZOLE SODIUM 40 MG: 40 TABLET, DELAYED RELEASE ORAL at 08:08

## 2025-08-30 RX ADMIN — FONDAPARINUX SODIUM 2.5 MG: 2.5 INJECTION, SOLUTION SUBCUTANEOUS at 08:08

## 2025-08-30 RX ADMIN — IPRATROPIUM BROMIDE 0.5 MG: 0.5 SOLUTION RESPIRATORY (INHALATION) at 07:52

## 2025-08-30 RX ADMIN — SERTRALINE 50 MG: 50 TABLET, FILM COATED ORAL at 08:08

## 2025-08-30 RX ADMIN — ARFORMOTEROL TARTRATE 15 MCG: 15 SOLUTION RESPIRATORY (INHALATION) at 07:52

## 2025-08-30 RX ADMIN — MONTELUKAST 10 MG: 10 TABLET, FILM COATED ORAL at 08:08

## 2025-08-30 RX ADMIN — LEVETIRACETAM 750 MG: 500 TABLET, FILM COATED ORAL at 08:07

## 2025-08-30 RX ADMIN — CETIRIZINE HYDROCHLORIDE 10 MG: 10 TABLET, FILM COATED ORAL at 08:08

## 2025-08-30 RX ADMIN — BUMETANIDE 2 MG: 1 TABLET ORAL at 08:08

## 2025-08-30 RX ADMIN — FLUTICASONE PROPIONATE 2 SPRAY: 50 SPRAY, METERED NASAL at 08:10

## 2025-08-30 RX ADMIN — MAGNESIUM GLUCONATE 500 MG ORAL TABLET 400 MG: 500 TABLET ORAL at 08:08

## 2025-08-30 RX ADMIN — SODIUM CHLORIDE, PRESERVATIVE FREE 10 ML: 5 INJECTION INTRAVENOUS at 08:09

## 2025-08-30 RX ADMIN — MICONAZOLE NITRATE: 20 POWDER TOPICAL at 08:09

## 2025-08-30 RX ADMIN — FERROUS SULFATE TAB 325 MG (65 MG ELEMENTAL FE) 325 MG: 325 (65 FE) TAB at 08:08

## 2025-08-30 RX ADMIN — Medication 3 MG: at 00:52

## 2025-08-30 ASSESSMENT — PAIN SCALES - GENERAL
PAINLEVEL_OUTOF10: 0
PAINLEVEL_OUTOF10: 0

## 2025-09-06 ENCOUNTER — APPOINTMENT (OUTPATIENT)
Dept: GENERAL RADIOLOGY | Age: 80
End: 2025-09-06
Payer: MEDICARE

## 2025-09-06 ENCOUNTER — HOSPITAL ENCOUNTER (EMERGENCY)
Age: 80
Discharge: HOME | End: 2025-09-06
Attending: GENERAL PRACTICE
Payer: MEDICARE

## 2025-09-06 VITALS
SYSTOLIC BLOOD PRESSURE: 122 MMHG | DIASTOLIC BLOOD PRESSURE: 70 MMHG | RESPIRATION RATE: 16 BRPM | TEMPERATURE: 97.9 F | HEART RATE: 89 BPM | OXYGEN SATURATION: 94 %

## 2025-09-06 DIAGNOSIS — J20.9 BRONCHOSPASM WITH BRONCHITIS, ACUTE: Primary | ICD-10-CM

## 2025-09-06 LAB
ALBUMIN SERPL-MCNC: 2.8 G/DL (ref 3.2–4.6)
ALBUMIN/GLOB SERPL: 0.6 (ref 1–1.9)
ALP SERPL-CCNC: 86 U/L (ref 35–104)
ALT SERPL-CCNC: 17 U/L (ref 8–45)
ANION GAP SERPL CALC-SCNC: 13 MMOL/L (ref 7–16)
AST SERPL-CCNC: 21 U/L (ref 15–37)
BASOPHILS # BLD: 0.04 K/UL (ref 0–0.2)
BASOPHILS NFR BLD: 0.7 % (ref 0–2)
BILIRUB SERPL-MCNC: 0.9 MG/DL (ref 0–1.2)
BUN SERPL-MCNC: 58 MG/DL (ref 8–23)
CALCIUM SERPL-MCNC: 9.2 MG/DL (ref 8.8–10.2)
CHLORIDE SERPL-SCNC: 100 MMOL/L (ref 98–107)
CO2 SERPL-SCNC: 25 MMOL/L (ref 20–29)
CREAT SERPL-MCNC: 2.42 MG/DL (ref 0.6–1.1)
DIFFERENTIAL METHOD BLD: ABNORMAL
EOSINOPHIL # BLD: 0.14 K/UL (ref 0–0.8)
EOSINOPHIL NFR BLD: 2.5 % (ref 0.5–7.8)
ERYTHROCYTE [DISTWIDTH] IN BLOOD BY AUTOMATED COUNT: 17.1 % (ref 11.9–14.6)
GLOBULIN SER CALC-MCNC: 4.6 G/DL (ref 2.3–3.5)
GLUCOSE SERPL-MCNC: 111 MG/DL (ref 70–99)
HCT VFR BLD AUTO: 32.5 % (ref 35.8–46.3)
HGB BLD-MCNC: 10.3 G/DL (ref 11.7–15.4)
IMM GRANULOCYTES # BLD AUTO: 0.02 K/UL (ref 0–0.5)
IMM GRANULOCYTES NFR BLD AUTO: 0.4 % (ref 0–5)
LYMPHOCYTES # BLD: 0.59 K/UL (ref 0.5–4.6)
LYMPHOCYTES NFR BLD: 10.6 % (ref 13–44)
MCH RBC QN AUTO: 28.6 PG (ref 26.1–32.9)
MCHC RBC AUTO-ENTMCNC: 31.7 G/DL (ref 31.4–35)
MCV RBC AUTO: 90.3 FL (ref 82–102)
MONOCYTES # BLD: 0.66 K/UL (ref 0.1–1.3)
MONOCYTES NFR BLD: 11.9 % (ref 4–12)
NEUTS SEG # BLD: 4.1 K/UL (ref 1.7–8.2)
NEUTS SEG NFR BLD: 73.9 % (ref 43–78)
NRBC # BLD: 0 K/UL (ref 0–0.2)
PLATELET # BLD AUTO: 170 K/UL (ref 150–450)
PMV BLD AUTO: 12.9 FL (ref 9.4–12.3)
POTASSIUM SERPL-SCNC: 4.2 MMOL/L (ref 3.5–5.1)
PROT SERPL-MCNC: 7.3 G/DL (ref 6.3–8.2)
RBC # BLD AUTO: 3.6 M/UL (ref 4.05–5.2)
SARS-COV-2 RDRP RESP QL NAA+PROBE: NOT DETECTED
SODIUM SERPL-SCNC: 138 MMOL/L (ref 136–145)
SOURCE: NORMAL
WBC # BLD AUTO: 5.6 K/UL (ref 4.3–11.1)

## 2025-09-06 PROCEDURE — 2500000003 HC RX 250 WO HCPCS: Performed by: GENERAL PRACTICE

## 2025-09-06 PROCEDURE — 87635 SARS-COV-2 COVID-19 AMP PRB: CPT

## 2025-09-06 PROCEDURE — 85025 COMPLETE CBC W/AUTO DIFF WBC: CPT

## 2025-09-06 PROCEDURE — 94640 AIRWAY INHALATION TREATMENT: CPT

## 2025-09-06 PROCEDURE — 6370000000 HC RX 637 (ALT 250 FOR IP): Performed by: GENERAL PRACTICE

## 2025-09-06 PROCEDURE — 80053 COMPREHEN METABOLIC PANEL: CPT

## 2025-09-06 PROCEDURE — 71045 X-RAY EXAM CHEST 1 VIEW: CPT

## 2025-09-06 PROCEDURE — 6360000002 HC RX W HCPCS: Performed by: GENERAL PRACTICE

## 2025-09-06 RX ORDER — IPRATROPIUM BROMIDE AND ALBUTEROL SULFATE 2.5; .5 MG/3ML; MG/3ML
1 SOLUTION RESPIRATORY (INHALATION)
Status: COMPLETED | OUTPATIENT
Start: 2025-09-06 | End: 2025-09-06

## 2025-09-06 RX ORDER — PREDNISONE 20 MG/1
20 TABLET ORAL 2 TIMES DAILY
Qty: 10 TABLET | Refills: 0 | Status: SHIPPED | OUTPATIENT
Start: 2025-09-06 | End: 2025-09-11

## 2025-09-06 RX ORDER — ACETAMINOPHEN AND CODEINE PHOSPHATE 300; 30 MG/1; MG/1
1 TABLET ORAL EVERY 8 HOURS PRN
Qty: 12 TABLET | Refills: 0 | Status: SHIPPED | OUTPATIENT
Start: 2025-09-06 | End: 2025-09-10

## 2025-09-06 RX ADMIN — WATER 125 MG: 1 INJECTION INTRAMUSCULAR; INTRAVENOUS; SUBCUTANEOUS at 12:13

## 2025-09-06 RX ADMIN — IPRATROPIUM BROMIDE AND ALBUTEROL SULFATE 1 DOSE: 2.5; .5 SOLUTION RESPIRATORY (INHALATION) at 11:44

## 2025-09-06 ASSESSMENT — PAIN SCALES - GENERAL: PAINLEVEL_OUTOF10: 0

## (undated) DEVICE — STOPCOCK ANGIO 1050PSI DK BLU L ROT M LUER 3 W OFF HNDL

## (undated) DEVICE — DRAPE SURG EQUIP DISC 66X44 -- USE ITEM 141557

## (undated) DEVICE — GUIDEWIRE VASC L260CM DIA0.035IN TAPR L11CM FLPY TIP L4CM

## (undated) DEVICE — SYR 50ML LR LCK 1ML GRAD NSAF --

## (undated) DEVICE — Device

## (undated) DEVICE — GLOVE SURG SZ 7 L11.2IN THK9.8MIL STRW LTX POLYMER BEAD CUF

## (undated) DEVICE — BUTTON SWITCH PENCIL BLADE ELECTRODE, HOLSTER: Brand: EDGE

## (undated) DEVICE — CATHETERIZATION TRAY FOL 16 FR 5 CC INF CTRL LUBRI-SIL IC

## (undated) DEVICE — AMD ANTIMICROBIAL GAUZE SPONGES,12 PLY USP TYPE VII, 0.2% POLYHEXAMETHYLENE BIGUANIDE HCI (PHMB): Brand: CURITY

## (undated) DEVICE — REM POLYHESIVE ADULT PATIENT RETURN ELECTRODE: Brand: VALLEYLAB

## (undated) DEVICE — SUTURE NONABSORBABLE MONOFILAMENT 5-0 C-1 1X24 IN PROLENE 8725H

## (undated) DEVICE — SUTURE VCRL SZ 4-0 L27IN ABSRB UD L19MM PS-2 3/8 CIR PRIM J426H

## (undated) DEVICE — BLADE SAW W10XL54MM FOR PRI REPEAT STRNOTMY

## (undated) DEVICE — SUTURE VCRL SZ 3-0 L36IN ABSRB UD L36MM CT-1 1/2 CIR J944H

## (undated) DEVICE — 3M™ TEGADERM™ TRANSPARENT FILM DRESSING FRAME STYLE, 1626W, 4 IN X 4-3/4 IN (10 CM X 12 CM), 50/CT 4CT/CASE: Brand: 3M™ TEGADERM™

## (undated) DEVICE — (D)PREP SKN CHLRAPRP APPL 26ML -- CONVERT TO ITEM 371833

## (undated) DEVICE — SOLUTION IRRIG 3000ML 0.9% SOD CHL FLX CONT 0797208] ICU MEDICAL INC]

## (undated) DEVICE — CATH URETH INTMIT ROB 14FR FUN -- USE ITEM 179521

## (undated) DEVICE — 3000CC GUARDIAN II: Brand: GUARDIAN

## (undated) DEVICE — (D)STRIP SKN CLSR 0.5X4IN WHT --

## (undated) DEVICE — DRAPE SURG SPEC PROC 4 PC

## (undated) DEVICE — SOLUTION IV 1000ML 0.9% SOD CHL

## (undated) DEVICE — SUTURE ETHBND EXCEL SZ 0 L18IN NONABSORBABLE GRN L36MM CT-1 CX21D